# Patient Record
Sex: FEMALE | Race: WHITE | Employment: UNEMPLOYED | URBAN - METROPOLITAN AREA
[De-identification: names, ages, dates, MRNs, and addresses within clinical notes are randomized per-mention and may not be internally consistent; named-entity substitution may affect disease eponyms.]

---

## 2020-06-29 ENCOUNTER — HOSPITAL ENCOUNTER (EMERGENCY)
Age: 40
Discharge: HOME | End: 2020-06-29
Attending: EMERGENCY MEDICINE
Payer: COMMERCIAL

## 2020-06-29 VITALS
WEIGHT: 246.44 LBS | DIASTOLIC BLOOD PRESSURE: 80 MMHG | HEIGHT: 62 IN | SYSTOLIC BLOOD PRESSURE: 137 MMHG | RESPIRATION RATE: 18 BRPM | OXYGEN SATURATION: 96 % | TEMPERATURE: 98 F | HEART RATE: 104 BPM | BODY MASS INDEX: 45.35 KG/M2

## 2020-06-29 PROCEDURE — 26010 DRAINAGE OF FINGER ABSCESS: CPT

## 2020-06-29 PROCEDURE — 99282 EMERGENCY DEPT VISIT SF MDM: CPT

## 2020-06-29 RX ORDER — DOXYCYCLINE HYCLATE 100 MG
100 TABLET ORAL 2 TIMES DAILY
Qty: 20 TABLET | Refills: 0 | Status: SHIPPED | OUTPATIENT
Start: 2020-06-29 | End: 2020-06-30 | Stop reason: SDUPTHER

## 2020-06-29 ASSESSMENT — PAIN DESCRIPTION - FREQUENCY: FREQUENCY: CONTINUOUS

## 2020-06-29 ASSESSMENT — PAIN DESCRIPTION - LOCATION: LOCATION: FINGER (COMMENT WHICH ONE)

## 2020-06-29 ASSESSMENT — PAIN SCALES - GENERAL: PAINLEVEL_OUTOF10: 10

## 2020-06-30 RX ORDER — DOXYCYCLINE HYCLATE 100 MG
100 TABLET ORAL 2 TIMES DAILY
Qty: 20 TABLET | Refills: 0 | Status: SHIPPED | OUTPATIENT
Start: 2020-06-30 | End: 2021-05-05

## 2020-06-30 ASSESSMENT — ENCOUNTER SYMPTOMS
NAUSEA: 0
COLOR CHANGE: 1
SINUS PRESSURE: 0
SHORTNESS OF BREATH: 0
COUGH: 0
VOMITING: 0

## 2020-06-30 NOTE — ED NOTES
Pt ambulatory to room 8 with c/o infection to fingernail, onset approximately 2-3 days ago. Right hand, 1st digit has moderate amount of swelling with redness and warmth and yellow colored center to lateral aspect of thumb. Pt denies any fevers or N/V. Respirations even and non labored. NAD noted.       Amanda Ibarra RN  06/29/20 6285

## 2020-06-30 NOTE — ED PROVIDER NOTES
[address] [place] [address] ED  EMERGENCY DEPARTMENT ENCOUNTER      Pt Name: Malcolm German  MRN: [id number]  [place] 1980  Date of evaluation: 6/30/20  CHIEF COMPLAINT       Chief Complaint   Patient presents with    Wound Check     R thumb infection     HISTORY OF PRESENT ILLNESS   Since the emergency room via private auto with redness and swelling to the right thumb that started approximately 4 days ago. No injury or trauma. There has been no drainage. She denies fevers or decreased range of motion. The history is provided by the patient. REVIEW OF SYSTEMS     Review of Systems   Constitutional: Negative for activity change, fatigue and fever. HENT: Negative for congestion and sinus pressure. Respiratory: Negative for cough and shortness of breath. Cardiovascular: Negative for chest pain and palpitations. Gastrointestinal: Negative for nausea and vomiting. Musculoskeletal: Positive for arthralgias. Negative for joint swelling. Skin: Positive for color change and wound. Neurological: Negative for dizziness and headaches. Psychiatric/Behavioral: Negative for confusion and decreased concentration.      PASTMEDICAL HISTORY     Past Medical History:   Diagnosis Date    Asthma     history    Bipolar affect, depressed ([facility] [address]     ON MEDS    Environmental allergies     GERD (gastroesophageal reflux disease)     Lumbar pain     PVD (peripheral vascular disease) ([facility] [address]      SURGICAL HISTORY       Past Surgical History:   Procedure Laterality Date    DENTAL SURGERY  07/17/2013    extraction   x  2  teeth    DILATION AND CURETTAGE OF UTERUS  2010    WISDOM TOOTH EXTRACTION       CURRENT MEDICATIONS       Discharge Medication List as of 6/29/2020  9:14 PM      CONTINUE these medications which have NOT CHANGED    Details   ondansetron (ZOFRAN) 4 MG/2ML injection Infuse 2 mLs intravenously once as needed for Nausea for 1 dose., Disp-15 mL, R-0      citalopram (CELEXA) 40 MG tablet Take 40 mg by below, unless otherwise noted in MDM or here. Interpretation per the Radiologist below, if available at the time of this note:    No orders to display       LABS:  Labs Reviewed - No data to display    All other labs were within normal range or not returned as of this dictation. EMERGENCY DEPARTMENT COURSE and DIFFERENTIAL DIAGNOSIS/MDM:   Vitals:    Vitals:    20   BP: 137/80   Pulse: 104   Resp: 18   Temp: 98 °F (36.7 °C)   TempSrc: Oral   SpO2: 96%   Weight: 246 lb 7 oz (111.8 kg)   Height: 5' 2\" (1.575 m)       Medical Decision Makin-year-old female who is afebrile does not appear ill. No distress. She has paronychia to the right fingernail which was drained as documented below. She was discharged home with doxycycline. PROCEDURES:  Incision/Drainage  Date/Time: 2020 12:19 AM  Performed by: MARIBEL Fuentes - CNP  Authorized by: Syed Maurer MD     Consent:     Consent obtained:  Verbal    Consent given by:  Patient    Risks discussed:  Incomplete drainage and infection  Location:     Indications for incision and drainage: Paronychia. Location:  Upper extremity    Upper extremity location:  Finger    Finger location:  R thumb  Pre-procedure details:     Skin preparation:  Betadine  Anesthesia (see MAR for exact dosages): Anesthesia method: Ethyl chloride. Procedure details:     Scalpel blade:  11    Drainage amount: Moderate    Wound treatment:  Wound left open  Post-procedure details:     Patient tolerance of procedure: Tolerated well, no immediate complications        The patient was given the following meds in the ED:  Orders Placed This Encounter   Medications    doxycycline hyclate (VIBRA-TABS) 100 MG tablet     Sig: Take 1 tablet by mouth 2 times daily     Dispense:  20 tablet     Refill:  0       FINAL IMPRESSION      1.  Paronychia of finger of right hand          DISPOSITION/PLAN   DISPOSITION Decision To Discharge 2020 09:11:12 PM      PATIENT REFERRED TO:   April Alaniz  [address] [facility] [address] [facility]  [phone]    Schedule an appointment as soon as possible for a visit         DISCHARGE MEDICATIONS:     Discharge Medication List as of 6/29/2020  9:14 PM      START taking these medications    Details   doxycycline hyclate (VIBRA-TABS) 100 MG tablet Take 1 tablet by mouth 2 times daily, Disp-20 tablet, R-0Print             CRITICAL CARE TIME       Please note that portions of this note were completed with a voice recognition program.      MARIBEL GUEAVRA - CNP            MARIBEL Balderas - YUSRA  06/30/20 0020

## 2021-04-18 ENCOUNTER — APPOINTMENT (OUTPATIENT)
Dept: GENERAL RADIOLOGY | Facility: CLINIC | Age: 41
End: 2021-04-18
Payer: COMMERCIAL

## 2021-04-18 ENCOUNTER — HOSPITAL ENCOUNTER (EMERGENCY)
Facility: CLINIC | Age: 41
Discharge: HOME | End: 2021-04-19
Attending: EMERGENCY MEDICINE
Payer: COMMERCIAL

## 2021-04-18 VITALS
BODY MASS INDEX: 48.4 KG/M2 | DIASTOLIC BLOOD PRESSURE: 93 MMHG | TEMPERATURE: 97.5 F | OXYGEN SATURATION: 97 % | WEIGHT: 263 LBS | HEIGHT: 62 IN | RESPIRATION RATE: 20 BRPM | SYSTOLIC BLOOD PRESSURE: 157 MMHG

## 2021-04-18 DIAGNOSIS — R07.9 CHEST PAIN, UNSPECIFIED TYPE: Primary | ICD-10-CM

## 2021-04-18 LAB
ABSOLUTE EOS #: 0.1 K/UL (ref 0–0.4)
ABSOLUTE IMMATURE GRANULOCYTE: ABNORMAL K/UL (ref 0–0.3)
ABSOLUTE LYMPH #: 3 K/UL (ref 1–4.8)
ABSOLUTE MONO #: 1 K/UL (ref 0.1–1.2)
ALBUMIN SERPL-MCNC: 4.3 G/DL (ref 3.5–5.2)
ALBUMIN/GLOBULIN RATIO: 1.2 (ref 1–2.5)
ALP BLD-CCNC: 83 U/L (ref 35–104)
ALT SERPL-CCNC: 44 U/L (ref 5–33)
ANION GAP SERPL CALCULATED.3IONS-SCNC: 11 MMOL/L (ref 9–17)
AST SERPL-CCNC: 25 U/L
BASOPHILS # BLD: 1 % (ref 0–2)
BASOPHILS ABSOLUTE: 0.1 K/UL (ref 0–0.2)
BILIRUB SERPL-MCNC: 0.2 MG/DL (ref 0.3–1.2)
BILIRUBIN DIRECT: <0.08 MG/DL
BILIRUBIN, INDIRECT: ABNORMAL MG/DL (ref 0–1)
BUN BLDV-MCNC: 16 MG/DL (ref 6–20)
BUN/CREAT BLD: ABNORMAL (ref 9–20)
CALCIUM SERPL-MCNC: 9.3 MG/DL (ref 8.6–10.4)
CHLORIDE BLD-SCNC: 106 MMOL/L (ref 98–107)
CO2: 24 MMOL/L (ref 20–31)
CREAT SERPL-MCNC: 0.5 MG/DL (ref 0.5–0.9)
DIFFERENTIAL TYPE: ABNORMAL
EOSINOPHILS RELATIVE PERCENT: 1 % (ref 1–4)
GFR AFRICAN AMERICAN: >60 ML/MIN
GFR NON-AFRICAN AMERICAN: >60 ML/MIN
GFR SERPL CREATININE-BSD FRML MDRD: ABNORMAL ML/MIN/{1.73_M2}
GFR SERPL CREATININE-BSD FRML MDRD: ABNORMAL ML/MIN/{1.73_M2}
GLOBULIN: ABNORMAL G/DL (ref 1.5–3.8)
GLUCOSE BLD-MCNC: 117 MG/DL (ref 70–99)
HCT VFR BLD CALC: 40.1 % (ref 36–46)
HEMOGLOBIN: 12.9 G/DL (ref 12–16)
IMMATURE GRANULOCYTES: ABNORMAL %
LIPASE: 19 U/L (ref 13–60)
LYMPHOCYTES # BLD: 23 % (ref 24–44)
MCH RBC QN AUTO: 28.9 PG (ref 26–34)
MCHC RBC AUTO-ENTMCNC: 32.2 G/DL (ref 31–37)
MCV RBC AUTO: 89.6 FL (ref 80–100)
MONOCYTES # BLD: 8 % (ref 2–11)
MYOGLOBIN: <21 NG/ML (ref 25–58)
NRBC AUTOMATED: ABNORMAL PER 100 WBC
PDW BLD-RTO: 13.5 % (ref 12.5–15.4)
PLATELET # BLD: 439 K/UL (ref 140–450)
PLATELET ESTIMATE: ABNORMAL
PMV BLD AUTO: 7.7 FL (ref 6–12)
POTASSIUM SERPL-SCNC: 3.9 MMOL/L (ref 3.7–5.3)
RBC # BLD: 4.48 M/UL (ref 4–5.2)
RBC # BLD: ABNORMAL 10*6/UL
SEG NEUTROPHILS: 67 % (ref 36–66)
SEGMENTED NEUTROPHILS ABSOLUTE COUNT: 8.7 K/UL (ref 1.8–7.7)
SODIUM BLD-SCNC: 141 MMOL/L (ref 135–144)
TOTAL PROTEIN: 7.9 G/DL (ref 6.4–8.3)
TROPONIN INTERP: ABNORMAL
TROPONIN T: ABNORMAL NG/ML
TROPONIN, HIGH SENSITIVITY: <6 NG/L (ref 0–14)
WBC # BLD: 12.9 K/UL (ref 3.5–11)
WBC # BLD: ABNORMAL 10*3/UL

## 2021-04-18 PROCEDURE — 80076 HEPATIC FUNCTION PANEL: CPT

## 2021-04-18 PROCEDURE — 84484 ASSAY OF TROPONIN QUANT: CPT

## 2021-04-18 PROCEDURE — 36415 COLL VENOUS BLD VENIPUNCTURE: CPT

## 2021-04-18 PROCEDURE — 6370000000 HC RX 637 (ALT 250 FOR IP): Performed by: EMERGENCY MEDICINE

## 2021-04-18 PROCEDURE — 6360000002 HC RX W HCPCS: Performed by: EMERGENCY MEDICINE

## 2021-04-18 PROCEDURE — 83690 ASSAY OF LIPASE: CPT

## 2021-04-18 PROCEDURE — 99285 EMERGENCY DEPT VISIT HI MDM: CPT

## 2021-04-18 PROCEDURE — 83874 ASSAY OF MYOGLOBIN: CPT

## 2021-04-18 PROCEDURE — 96374 THER/PROPH/DIAG INJ IV PUSH: CPT

## 2021-04-18 PROCEDURE — 71045 X-RAY EXAM CHEST 1 VIEW: CPT

## 2021-04-18 PROCEDURE — 80048 BASIC METABOLIC PNL TOTAL CA: CPT

## 2021-04-18 PROCEDURE — 85025 COMPLETE CBC W/AUTO DIFF WBC: CPT

## 2021-04-18 PROCEDURE — 93005 ELECTROCARDIOGRAM TRACING: CPT | Performed by: EMERGENCY MEDICINE

## 2021-04-18 RX ORDER — ASPIRIN 81 MG/1
324 TABLET, CHEWABLE ORAL ONCE
Status: COMPLETED | OUTPATIENT
Start: 2021-04-18 | End: 2021-04-18

## 2021-04-18 RX ORDER — KETOROLAC TROMETHAMINE 30 MG/ML
30 INJECTION, SOLUTION INTRAMUSCULAR; INTRAVENOUS ONCE
Status: COMPLETED | OUTPATIENT
Start: 2021-04-18 | End: 2021-04-18

## 2021-04-18 RX ADMIN — ASPIRIN 324 MG: 81 TABLET, CHEWABLE ORAL at 20:20

## 2021-04-18 RX ADMIN — KETOROLAC TROMETHAMINE 30 MG: 30 INJECTION, SOLUTION INTRAMUSCULAR at 20:51

## 2021-04-18 ASSESSMENT — PAIN DESCRIPTION - ORIENTATION
ORIENTATION: LEFT

## 2021-04-18 ASSESSMENT — PAIN SCALES - GENERAL
PAINLEVEL_OUTOF10: 8
PAINLEVEL_OUTOF10: 8

## 2021-04-18 ASSESSMENT — PAIN DESCRIPTION - FREQUENCY
FREQUENCY: CONTINUOUS
FREQUENCY: CONTINUOUS

## 2021-04-18 ASSESSMENT — PAIN DESCRIPTION - PAIN TYPE: TYPE: ACUTE PAIN

## 2021-04-18 ASSESSMENT — PAIN DESCRIPTION - DESCRIPTORS: DESCRIPTORS: ACHING

## 2021-04-18 NOTE — ED PROVIDER NOTES
and adhesive tape. FAMILY HISTORY     has no family status information on file. family history is not on file. SOCIAL HISTORY      reports that she has never smoked. She has never used smokeless tobacco. She reports that she does not drink alcohol or use drugs. PHYSICAL EXAM     INITIAL VITALS:  height is 5' 2\" (1.575 m) and weight is 119.3 kg (263 lb). Her temperature is 97.5 °F (36.4 °C). Her blood pressure is 157/93 (abnormal). Her respiration is 20 and oxygen saturation is 97%. General: Patient is obese nontoxic-appearing morbidly obese female in no apparent distress  HEENT: Head is atraumatic conjunctiva are clear  Neck: Supple  Respiratory: Lung sounds are clear bilateral  Cardiac: Heart is regular rate and rhythm patient has trace reproducible pain with palpation over the anterior chest wall  GI: Abdomen is morbidly obese soft nontender bowel sounds are normal  Peripheral exam no cyanosis or edema  Neuro: Patient is alert oriented moves all 4 extremities well ambulated well    DIFFERENTIAL DIAGNOSIS/ MDM:     Acute coronary syndrome atypical chest pain chest wall pain pneumonia we will obtain EKG x-ray labs    DIAGNOSTIC RESULTS     EKG: All EKG's are interpreted by the Emergency Department Physician who either signs or Co-signs this chart in the absence of a cardiologist.    EKG interpreted by me reveals a normal sinus rhythm rate 87 with no acute ST elevations depressions normal WV interval normal QS complex normal axis    RADIOLOGY:   I directly visualized the following  images and reviewed the radiologist interpretations:  XR CHEST PORTABLE   Final Result   No acute process.                LABS:  Labs Reviewed   CBC WITH AUTO DIFFERENTIAL - Abnormal; Notable for the following components:       Result Value    WBC 12.9 (*)     Seg Neutrophils 67 (*)     Lymphocytes 23 (*)     Segs Absolute 8.70 (*)     All other components within normal limits   BASIC METABOLIC PANEL - Abnormal; Notable

## 2021-04-19 LAB
EKG ATRIAL RATE: 87 BPM
EKG P AXIS: 59 DEGREES
EKG P-R INTERVAL: 172 MS
EKG Q-T INTERVAL: 366 MS
EKG QRS DURATION: 90 MS
EKG QTC CALCULATION (BAZETT): 440 MS
EKG R AXIS: 43 DEGREES
EKG T AXIS: 50 DEGREES
EKG VENTRICULAR RATE: 87 BPM

## 2021-04-19 NOTE — ED NOTES
Pt presents to the ED via ambulation. Pt stated she went to the Urgent Care and was sent here for further evaluation.  Pt c/o left sided chest pain that began earlier this am. Pt c/o slight shortness of breath associated with the chest pain     Rosalia Cantu RN  04/18/21 2102       Rosalia Cantu RN  04/18/21 2105

## 2021-05-05 ENCOUNTER — HOSPITAL ENCOUNTER (EMERGENCY)
Facility: CLINIC | Age: 41
Discharge: HOME | End: 2021-05-05
Attending: EMERGENCY MEDICINE
Payer: COMMERCIAL

## 2021-05-05 VITALS
OXYGEN SATURATION: 97 % | BODY MASS INDEX: 46.78 KG/M2 | WEIGHT: 264 LBS | HEIGHT: 63 IN | HEART RATE: 100 BPM | DIASTOLIC BLOOD PRESSURE: 94 MMHG | TEMPERATURE: 98.1 F | RESPIRATION RATE: 18 BRPM | SYSTOLIC BLOOD PRESSURE: 134 MMHG

## 2021-05-05 DIAGNOSIS — K02.9 DENTAL CARIES: Primary | ICD-10-CM

## 2021-05-05 PROCEDURE — 6370000000 HC RX 637 (ALT 250 FOR IP): Performed by: EMERGENCY MEDICINE

## 2021-05-05 PROCEDURE — 99282 EMERGENCY DEPT VISIT SF MDM: CPT

## 2021-05-05 RX ORDER — CLINDAMYCIN HYDROCHLORIDE 150 MG/1
300 CAPSULE ORAL ONCE
Status: COMPLETED | OUTPATIENT
Start: 2021-05-05 | End: 2021-05-05

## 2021-05-05 RX ORDER — IBUPROFEN 800 MG/1
800 TABLET ORAL ONCE
Status: COMPLETED | OUTPATIENT
Start: 2021-05-05 | End: 2021-05-05

## 2021-05-05 RX ORDER — CLINDAMYCIN HYDROCHLORIDE 150 MG/1
300 CAPSULE ORAL 4 TIMES DAILY
Qty: 80 CAPSULE | Refills: 0 | Status: SHIPPED | OUTPATIENT
Start: 2021-05-05 | End: 2021-05-15

## 2021-05-05 RX ORDER — IBUPROFEN 800 MG/1
800 TABLET ORAL EVERY 8 HOURS PRN
Qty: 30 TABLET | Refills: 0 | OUTPATIENT
Start: 2021-05-05 | End: 2021-05-21

## 2021-05-05 RX ADMIN — IBUPROFEN 800 MG: 800 TABLET, FILM COATED ORAL at 16:46

## 2021-05-05 RX ADMIN — CLINDAMYCIN HYDROCHLORIDE 300 MG: 150 CAPSULE ORAL at 16:45

## 2021-05-05 ASSESSMENT — PAIN SCALES - GENERAL
PAINLEVEL_OUTOF10: 10
PAINLEVEL_OUTOF10: 10

## 2021-05-05 ASSESSMENT — PAIN DESCRIPTION - ORIENTATION: ORIENTATION: LEFT

## 2021-05-05 ASSESSMENT — PAIN DESCRIPTION - FREQUENCY: FREQUENCY: CONTINUOUS

## 2021-05-05 NOTE — ED NOTES
Assessment complaining of gum pain started few days ago. She does not have a dental appointment pending.       Chelsey Platt RN  05/05/21 5319

## 2021-05-05 NOTE — ED PROVIDER NOTES
intravenously once as needed for Nausea for 1 dose. TRAZODONE (DESYREL) 50 MG TABLET    Take 50 mg by mouth nightly. ALLERGIES     is allergic to ampicillin; bee venom; codeine; morphine; and adhesive tape. FAMILY HISTORY     has no family status information on file. family history is not on file. SOCIAL HISTORY      reports that she has never smoked. She has never used smokeless tobacco. She reports that she does not drink alcohol or use drugs. PHYSICAL EXAM       ED Triage Vitals [05/05/21 1627]   BP Temp Temp Source Pulse Resp SpO2 Height Weight   (!) 134/94 98.1 °F (36.7 °C) Oral 100 18 97 % 5' 3\" (1.6 m) 264 lb (119.7 kg)       Constitutional: Alert,  nontoxic flat affect. Answering questions in no acute distress. HEENT: Extraocular muscles intact, mucus membranes moist, TMs clear bilaterally, no posterior pharyngeal erythema or exudates, Pupils equal, round, reactive to light, dental decay tooth #17 with mild erythema no fluctuance. Neck: Trachea midline no meningismus  Cardiovascular: Regular rhythm and rate no S3, S4, or murmurs   Respiratory: Clear to auscultation bilaterally no wheezes, rhonchi, rales, no respiratory distress no tachypnea no retractions no hypoxia  Gastrointestinal: Soft, nontender, nondistended, positive bowel sounds. No rebound, rigidity, or guarding. Obese  Musculoskeletal: No extremity pain chronic lower extremity swelling no asymmetry no calf tenderness  Neurologic: Moving all 4 extremities without difficulty there are no gross focal neurologic deficits   Skin: Warm and dry       DIFFERENTIAL DIAGNOSIS/ MDM:     Dental caries. Antibiotics. No facial swelling. Motrin. Follow-up with dentist and return if worsening symptoms or other concerns.     DIAGNOSTIC RESULTS     EKG: All EKG's are interpreted by the Emergency Department Physician who either signs or Co-signs this chart in the absence of a cardiologist.        Not indicated unless otherwise documented above    LABS:  No results found for this visit on 05/05/21. Not indicated unless otherwise documented above    RADIOLOGY:   I reviewed the radiologist interpretations:    No orders to display       Not indicated unless otherwise documented above    EMERGENCY DEPARTMENT COURSE:     The patient was given the following medications:  Orders Placed This Encounter   Medications    clindamycin (CLEOCIN) capsule 300 mg     Order Specific Question:   Antimicrobial Indications     Answer: Other     Order Specific Question:   Other Abx Indication     Answer:   dental caries    ibuprofen (ADVIL;MOTRIN) tablet 800 mg    clindamycin (CLEOCIN) 150 MG capsule     Sig: Take 2 capsules by mouth 4 times daily for 10 days     Dispense:  80 capsule     Refill:  0    ibuprofen (ADVIL;MOTRIN) 800 MG tablet     Sig: Take 1 tablet by mouth every 8 hours as needed for Pain     Dispense:  30 tablet     Refill:  0        Vitals:   -------------------------  BP (!) 134/94   Pulse 100   Temp 98.1 °F (36.7 °C) (Oral)   Resp 18   Ht 5' 3\" (1.6 m)   Wt 119.7 kg (264 lb)   SpO2 97%   BMI 46.77 kg/m²           The patient understands that at this time there is no evidence for a more malignant underlying process, but also understands that early in the process of an illness or injury, an emergency department workup can be falsely reassuring. Routine discharge counseling was given, and it is understood that worsening, changing or persistent symptoms should prompt an immediate call or follow up with their primary physician or return to the emergency department. The importance of appropriate follow up was also discussed. I have reviewed the disposition diagnosis. I have answered the questions and given discharge instructions. There was voiced understanding of these instructions and no further questions or complaints.     CRITICAL CARE:    None    CONSULTS:    None    PROCEDURES:    None      OARRS Report if

## 2021-05-21 ENCOUNTER — HOSPITAL ENCOUNTER (EMERGENCY)
Facility: CLINIC | Age: 41
Discharge: HOME | End: 2021-05-21
Attending: EMERGENCY MEDICINE
Payer: COMMERCIAL

## 2021-05-21 VITALS
RESPIRATION RATE: 22 BRPM | OXYGEN SATURATION: 97 % | HEIGHT: 62 IN | HEART RATE: 83 BPM | SYSTOLIC BLOOD PRESSURE: 116 MMHG | DIASTOLIC BLOOD PRESSURE: 81 MMHG | TEMPERATURE: 98.6 F | BODY MASS INDEX: 48.4 KG/M2 | WEIGHT: 263 LBS

## 2021-05-21 DIAGNOSIS — J45.21 MILD INTERMITTENT ASTHMA WITH EXACERBATION: Primary | ICD-10-CM

## 2021-05-21 PROCEDURE — 99282 EMERGENCY DEPT VISIT SF MDM: CPT

## 2021-05-21 RX ORDER — ALBUTEROL SULFATE 2.5 MG/3ML
2.5 SOLUTION RESPIRATORY (INHALATION) EVERY 6 HOURS PRN
Qty: 30 EACH | Refills: 0 | Status: SHIPPED | OUTPATIENT
Start: 2021-05-21

## 2021-05-21 RX ORDER — ALBUTEROL SULFATE 90 UG/1
2 AEROSOL, METERED RESPIRATORY (INHALATION) EVERY 6 HOURS PRN
Qty: 1 INHALER | Refills: 1 | Status: SHIPPED | OUTPATIENT
Start: 2021-05-21 | End: 2022-10-20 | Stop reason: SDUPTHER

## 2021-05-21 ASSESSMENT — ENCOUNTER SYMPTOMS
ABDOMINAL PAIN: 0
CHEST TIGHTNESS: 0

## 2021-05-21 NOTE — ED PROVIDER NOTES
[address] ED  EMERGENCY DEPARTMENT ENCOUNTER      Pt Name: Shawanda Holguin  MRN: [id number]  [place] 1980  Date of evaluation: 5/21/2021  Provider: Noni Magaña MD    CHIEF COMPLAINT     Chief Complaint   Patient presents with    Shortness of Breath     since last night, hx of asthma. denies cough. pt is fully vaccinated. denies fever. pt states she doesn't have any inhalers at home and has never been prescribed anything for asthma. pt is not a smoker. HISTORY OF PRESENT ILLNESS   (Location/Symptom, Timing/Onset, Context/Setting,Quality, Duration, Modifying Factors, Severity)  Note limiting factors. Shawanda Holguin is a 41 y.o. female who presents to the emergency department stating she has a history of asthma and is experiencing a recent flareup in the last few days. She has used inhalers and nebulized albuterol in the past but does not have any tubing and has run out of her inhalers. She denies fever, hemoptysis or myalgias. The history is provided by the patient and medical records. Nursing Notes werereviewed. REVIEW OF SYSTEMS    (2-9 systems for level 4, 10 or more for level 5)     Review of Systems   Constitutional: Negative for fever. Respiratory: Negative for chest tightness. Gastrointestinal: Negative for abdominal pain. All other systems reviewed and are negative. Except as noted above the remainder of the review of systems was reviewed and negative.        PAST MEDICAL HISTORY     Past Medical History:   Diagnosis Date    Asthma     history    Bipolar affect, depressed ([address]     ON MEDS    Environmental allergies     GERD (gastroesophageal reflux disease)     Lumbar pain     PVD (peripheral vascular disease) ([facility] [address]          SURGICALHISTORY       Past Surgical History:   Procedure Laterality Date    DENTAL SURGERY  07/17/2013    extraction   x  2  teeth    DILATION AND CURETTAGE OF UTERUS  2010    WISDOM TOOTH EXTRACTION           CURRENT MEDICATIONS       Previous Medications    CITALOPRAM (CELEXA) 40 MG TABLET    Take 40 mg by mouth every morning. ONDANSETRON (ZOFRAN) 4 MG/2ML INJECTION    Infuse 2 mLs intravenously once as needed for Nausea for 1 dose. TRAZODONE (DESYREL) 50 MG TABLET    Take 50 mg by mouth nightly. ALLERGIES     Ampicillin, Bee venom, Codeine, Morphine, and Adhesive tape    FAMILY HISTORY     No family history on file. SOCIAL HISTORY       Social History     Socioeconomic History    Marital status: Single     Spouse name: Not on file    Number of children: Not on file    Years of education: Not on file    Highest education level: Not on file   Occupational History    Not on file   Tobacco Use    Smoking status: Never Smoker    Smokeless tobacco: Never Used   Vaping Use    Vaping Use: Never used   Substance and Sexual Activity    Alcohol use: No    Drug use: No    Sexual activity: Not on file   Other Topics Concern    Not on file   Social History Narrative    Not on file     Social Determinants of Health     Financial Resource Strain:     Difficulty of Paying Living Expenses:    Food Insecurity:     Worried About Running Out of Food in the Last Year:     [address] in the Last Year:    Transportation Needs:     Lack of Transportation (Medical):      Lack of Transportation (Non-Medical):    Physical Activity:     Days of Exercise per Week:     Minutes of Exercise per Session:    Stress:     Feeling of Stress :    Social Connections:     Frequency of Communication with Friends and Family:     Frequency of Social Gatherings with Friends and Family:     Attends [employer] Services:     Active Member of Clubs or Organizations:     Attends Club or Organization Meetings:     Marital Status:    Intimate Partner Violence:     Fear of Current or Ex-Partner:     Emotionally Abused:     Physically Abused:     Sexually Abused:        SCREENINGS             PHYSICAL EXAM    (up to 7 for level 4, 8 or more for level 5)     ED Triage Vitals [05/21/21 1650]   BP Temp Temp Source Pulse Resp SpO2 Height Weight   116/81 98.6 °F (37 °C) Oral 83 22 97 % 5' 2\" (1.575 m) 263 lb (119.3 kg)       Physical Exam  Vitals reviewed. Constitutional:       General: She is not in acute distress. Appearance: She is obese. HENT:      Head: Normocephalic. Nose: Nose normal.   Eyes:      Extraocular Movements: Extraocular movements intact. Cardiovascular:      Rate and Rhythm: Normal rate and regular rhythm. Pulmonary:      Effort: Pulmonary effort is normal.      Breath sounds: Normal breath sounds. No rhonchi or rales. Skin:     General: Skin is warm and dry. Coloration: Skin is not pale. Neurological:      General: No focal deficit present. Mental Status: She is alert. DIAGNOSTIC RESULTS     EKG: All EKG's are interpreted by the Emergency Department Physician who either signs orCo-signs this chart in the absence of a cardiologist.    RADIOLOGY:     Interpretation per the Radiologist below, ifavailable at the time of this note:    No orders to display         ED BEDSIDE ULTRASOUND:   Performed by ED Physician - none    LABS:  Labs Reviewed - No data to display    All other labs were within normal range ornot returned as of this dictation. EMERGENCY DEPARTMENT COURSE and DIFFERENTIAL DIAGNOSIS/MDM:   Vitals:    Vitals:    05/21/21 1650   BP: 116/81   Pulse: 83   Resp: 22   Temp: 98.6 °F (37 °C)   TempSrc: Oral   SpO2: 97%   Weight: 119.3 kg (263 lb)   Height: 5' 2\" (1.575 m)            Patient presents with exacerbation of asthma symptoms and a history of underlying asthma. She is in no respiratory distress at the time of my evaluation and will be prescribed her inhaled bronchodilators. MDM    CONSULTS:  None    PROCEDURES:  Unlessotherwise noted below, none     Procedures    FINAL IMPRESSION      1.  Mild intermittent asthma with exacerbation          DISPOSITION/PLAN   DISPOSITION

## 2021-05-30 ENCOUNTER — HOSPITAL ENCOUNTER (EMERGENCY)
Facility: CLINIC | Age: 41
Discharge: HOME | End: 2021-05-30
Attending: SPECIALIST
Payer: COMMERCIAL

## 2021-05-30 ENCOUNTER — APPOINTMENT (OUTPATIENT)
Dept: GENERAL RADIOLOGY | Facility: CLINIC | Age: 41
End: 2021-05-30
Payer: COMMERCIAL

## 2021-05-30 VITALS
OXYGEN SATURATION: 96 % | WEIGHT: 263 LBS | BODY MASS INDEX: 48.4 KG/M2 | HEIGHT: 62 IN | HEART RATE: 86 BPM | DIASTOLIC BLOOD PRESSURE: 75 MMHG | RESPIRATION RATE: 16 BRPM | TEMPERATURE: 98.7 F | SYSTOLIC BLOOD PRESSURE: 114 MMHG

## 2021-05-30 DIAGNOSIS — S46.911A STRAIN OF RIGHT SHOULDER, INITIAL ENCOUNTER: Primary | ICD-10-CM

## 2021-05-30 DIAGNOSIS — S29.012A UPPER BACK STRAIN, INITIAL ENCOUNTER: ICD-10-CM

## 2021-05-30 PROCEDURE — 99285 EMERGENCY DEPT VISIT HI MDM: CPT

## 2021-05-30 PROCEDURE — 6370000000 HC RX 637 (ALT 250 FOR IP): Performed by: SPECIALIST

## 2021-05-30 PROCEDURE — 73030 X-RAY EXAM OF SHOULDER: CPT

## 2021-05-30 PROCEDURE — 71045 X-RAY EXAM CHEST 1 VIEW: CPT

## 2021-05-30 RX ORDER — IBUPROFEN 800 MG/1
800 TABLET ORAL EVERY 8 HOURS PRN
Qty: 20 TABLET | Refills: 0 | Status: SHIPPED | OUTPATIENT
Start: 2021-05-30 | End: 2021-07-13

## 2021-05-30 RX ORDER — IBUPROFEN 800 MG/1
800 TABLET ORAL ONCE
Status: COMPLETED | OUTPATIENT
Start: 2021-05-30 | End: 2021-05-30

## 2021-05-30 RX ADMIN — IBUPROFEN 800 MG: 800 TABLET, FILM COATED ORAL at 02:37

## 2021-05-30 ASSESSMENT — PAIN DESCRIPTION - LOCATION: LOCATION: BACK

## 2021-05-30 ASSESSMENT — PAIN DESCRIPTION - DESCRIPTORS: DESCRIPTORS: ACHING

## 2021-05-30 ASSESSMENT — PAIN DESCRIPTION - ORIENTATION: ORIENTATION: RIGHT

## 2021-05-30 ASSESSMENT — PAIN DESCRIPTION - FREQUENCY: FREQUENCY: INTERMITTENT

## 2021-05-30 ASSESSMENT — PAIN SCALES - GENERAL: PAINLEVEL_OUTOF10: 10

## 2021-05-30 ASSESSMENT — PAIN DESCRIPTION - PAIN TYPE: TYPE: ACUTE PAIN;CHRONIC PAIN

## 2021-05-30 NOTE — ED PROVIDER NOTES
[facility]  [facility]  Phone: [phone]      Pt Name: Sola Metzger  MRN: [id number]  [place] 1980  Date of evaluation: 5/30/2021      CHIEF COMPLAINT       Chief Complaint   Patient presents with    Back Pain    Shoulder Pain         HISTORY OF PRESENT ILLNESS    Sola Metzger is a 41 y.o. female who presents   Chief Complaint   Patient presents with    Back Pain    Shoulder Pain   . 40-year-old female patient presents to the emergency department for evaluation of right sided upper back pain and right shoulder pain since last night. Patient denies any fall or injuries. She denies any cough, shortness of breath, fever or chills. Pain is worse with the movements and better with rest. She grades pain as 9 out of 10 in intensity dull aching in nature. Patient has not taken any medications for the pain. She denies any chest pain, shortness of breath, nausea, vomiting, lightheadedness, dizziness, palpitations or diaphoresis. She has history of intermittent back pain but it is mostly in the lower back. She denies any swelling in the legs and denies any calf pain. She denies any recent long travels or prolonged immobilization and no history of DVT or PE in the past. She has history of bronchial asthma but denies any wheezing at this time. REVIEW OF SYSTEMS       Review of Systems    All systems reviewed and negative unless noted in HPI. The patient denies fever or constitutional symptoms. Denies vision change. Denies any sore throat or rhinorrhea. Denies any neck pain or stiffness. Denies chest pain or shortness of breath. No nausea,  vomiting or diarrhea. Denies any dysuria. Denies urinary frequency or hematuria. Denies any weakness, numbness or focal neurologic deficit. Denies any skin rash or edema. No recent psychiatric issues. No easy bruising or bleeding.    Denies any polyuria, polydypsia       PAST MEDICAL HISTORY    has a past medical history of Asthma, Bipolar affect, depressed ([address] Environmental allergies, GERD (gastroesophageal reflux disease), Lumbar pain, and PVD (peripheral vascular disease) ([facility] [address] SURGICAL HISTORY      has a past surgical history that includes Dilation and curettage of uterus (2010); [place] tooth extraction; and Dental surgery (07/17/2013). CURRENT MEDICATIONS       Discharge Medication List as of 5/30/2021  3:13 AM      CONTINUE these medications which have NOT CHANGED    Details   albuterol sulfate  (90 Base) MCG/ACT inhaler Inhale 2 puffs into the lungs every 6 hours as needed for Wheezing or Shortness of Breath Indications: Wheezing, Disp-1 Inhaler, R-1Normal      albuterol (PROVENTIL) (2.5 MG/3ML) 0.083% nebulizer solution Take 3 mLs by nebulization every 6 hours as needed for Wheezing, Disp-30 each, R-0Normal      ondansetron (ZOFRAN) 4 MG/2ML injection Infuse 2 mLs intravenously once as needed for Nausea for 1 dose., Disp-15 mL, R-0      citalopram (CELEXA) 40 MG tablet Take 40 mg by mouth every morning. traZODone (DESYREL) 50 MG tablet Take 50 mg by mouth nightly. ALLERGIES     is allergic to ampicillin, bee venom, codeine, morphine, and adhesive tape. FAMILY HISTORY     has no family status information on file. family history is not on file. SOCIAL HISTORY      reports that she has never smoked. She has never used smokeless tobacco. She reports that she does not drink alcohol and does not use drugs. PHYSICAL EXAM     INITIAL VITALS:  height is 5' 2\" (1.575 m) and weight is 119.3 kg (263 lb). Her oral temperature is 98.7 °F (37.1 °C). Her blood pressure is 114/75 and her pulse is 86. Her respiration is 16 and oxygen saturation is 96%. Physical Exam  Vitals and nursing note reviewed. Constitutional:       Appearance: She is well-developed. HENT:      Head: Normocephalic and atraumatic.       Nose: Nose normal.   Eyes:      Extraocular Movements: HISTORY: pain Reason for Exam: Pain to the right shoulder and upper back sudden onset prior to arrival w/ no known injury Acuity: Acute Type of Exam: Initial FINDINGS: Glenohumeral joint is normally aligned. No evidence of acute fracture or dislocation. No abnormal periarticular calcifications. The [facility] joint is unremarkable in appearance. Visualized lung is unremarkable. No acute abnormality. XR CHEST PORTABLE    Result Date: 5/30/2021  EXAMINATION: ONE XRAY VIEW OF THE CHEST 5/30/2021 2:41 am COMPARISON: 04/18/2021 HISTORY: ORDERING SYSTEM PROVIDED HISTORY: Right upper back pain TECHNOLOGIST PROVIDED HISTORY: Right upper back pain Reason for Exam: Pain to the right shoulder and upper back sudden onset prior to arrival w/ no known injury Acuity: Acute Type of Exam: Initial FINDINGS: Mild stable cardiomegaly. Normal pulmonary vasculature. The lungs are clear and normally expanded. Surrounding osseous and soft tissue structures are unremarkable. No acute cardiopulmonary abnormality. Mild stable cardiomegaly. LABS:  Labs Reviewed - No data to display      EMERGENCY DEPARTMENT COURSE:   Vitals:    Vitals:    05/30/21 0147   BP: 114/75   Pulse: 86   Resp: 16   Temp: 98.7 °F (37.1 °C)   TempSrc: Oral   SpO2: 96%   Weight: 119.3 kg (263 lb)   Height: 5' 2\" (1.575 m)     -------------------------  BP: 114/75, Temp: 98.7 °F (37.1 °C), Pulse: 86, Resp: 16    Orders Placed This Encounter   Medications    ibuprofen (ADVIL;MOTRIN) tablet 800 mg    ibuprofen (ADVIL;MOTRIN) 800 MG tablet     Sig: Take 1 tablet by mouth every 8 hours as needed for Pain     Dispense:  20 tablet     Refill:  0       During the emergency department course, patient was given Motrin 10 mg orally after which her pain has improved significantly. She is able to move her shoulder freely and she is ambulatory in the hallway. Upper back pain is also improved. Her x-rays are unremarkable.  I do not have any high index of suspicion portions of this note were completed with a voice recognition program.  Efforts were made to edit the dictations but occasionally words are mis-transcribed.)    Krys Elias MD,, MD, F.A.C.E.P.   Attending Emergency Physician     Krys Elias MD  05/30/21 0101

## 2021-06-05 ENCOUNTER — APPOINTMENT (OUTPATIENT)
Dept: GENERAL RADIOLOGY | Facility: CLINIC | Age: 41
End: 2021-06-05
Payer: COMMERCIAL

## 2021-06-05 ENCOUNTER — HOSPITAL ENCOUNTER (EMERGENCY)
Facility: CLINIC | Age: 41
Discharge: HOME | End: 2021-06-05
Attending: EMERGENCY MEDICINE
Payer: COMMERCIAL

## 2021-06-05 VITALS
WEIGHT: 263 LBS | OXYGEN SATURATION: 93 % | RESPIRATION RATE: 18 BRPM | BODY MASS INDEX: 48.4 KG/M2 | TEMPERATURE: 99 F | DIASTOLIC BLOOD PRESSURE: 77 MMHG | SYSTOLIC BLOOD PRESSURE: 140 MMHG | HEIGHT: 62 IN | HEART RATE: 92 BPM

## 2021-06-05 DIAGNOSIS — K59.00 CONSTIPATION, UNSPECIFIED CONSTIPATION TYPE: Primary | ICD-10-CM

## 2021-06-05 LAB
ANION GAP SERPL CALCULATED.3IONS-SCNC: 11 MMOL/L (ref 9–17)
BUN BLDV-MCNC: 10 MG/DL (ref 6–20)
BUN/CREAT BLD: ABNORMAL (ref 9–20)
CALCIUM SERPL-MCNC: 9.3 MG/DL (ref 8.6–10.4)
CHLORIDE BLD-SCNC: 107 MMOL/L (ref 98–107)
CO2: 23 MMOL/L (ref 20–31)
CREAT SERPL-MCNC: 0.5 MG/DL (ref 0.5–0.9)
GFR AFRICAN AMERICAN: >60 ML/MIN
GFR NON-AFRICAN AMERICAN: >60 ML/MIN
GFR SERPL CREATININE-BSD FRML MDRD: ABNORMAL ML/MIN/{1.73_M2}
GFR SERPL CREATININE-BSD FRML MDRD: ABNORMAL ML/MIN/{1.73_M2}
GLUCOSE BLD-MCNC: 151 MG/DL (ref 70–99)
POTASSIUM SERPL-SCNC: 3.7 MMOL/L (ref 3.7–5.3)
SODIUM BLD-SCNC: 141 MMOL/L (ref 135–144)

## 2021-06-05 PROCEDURE — 74018 RADEX ABDOMEN 1 VIEW: CPT

## 2021-06-05 PROCEDURE — 6370000000 HC RX 637 (ALT 250 FOR IP): Performed by: EMERGENCY MEDICINE

## 2021-06-05 PROCEDURE — 36415 COLL VENOUS BLD VENIPUNCTURE: CPT

## 2021-06-05 PROCEDURE — 99285 EMERGENCY DEPT VISIT HI MDM: CPT

## 2021-06-05 PROCEDURE — 80048 BASIC METABOLIC PNL TOTAL CA: CPT

## 2021-06-05 RX ORDER — BISACODYL 10 MG
10 SUPPOSITORY, RECTAL RECTAL ONCE
Status: COMPLETED | OUTPATIENT
Start: 2021-06-05 | End: 2021-06-05

## 2021-06-05 RX ORDER — MAGNESIUM CARB/ALUMINUM HYDROX 105-160MG
150 TABLET,CHEWABLE ORAL 2 TIMES DAILY PRN
Qty: 2 BOTTLE | Refills: 0 | Status: SHIPPED | OUTPATIENT
Start: 2021-06-05 | End: 2021-06-07

## 2021-06-05 RX ORDER — DOCUSATE SODIUM 100 MG/1
100 CAPSULE, LIQUID FILLED ORAL 2 TIMES DAILY PRN
Qty: 28 CAPSULE | Refills: 0 | Status: SHIPPED | OUTPATIENT
Start: 2021-06-05 | End: 2021-06-19

## 2021-06-05 RX ADMIN — BISACODYL 10 MG: 10 SUPPOSITORY RECTAL at 18:00

## 2021-06-05 RX ADMIN — BISACODYL 10 MG: 10 SUPPOSITORY RECTAL at 18:01

## 2021-06-05 NOTE — ED PROVIDER NOTES
[facility]  [address] [facility]  Phone: [address]      Pt Name: Hugo Oleary  MRN: [id number]  [place] 1980  Date of evaluation: 6/5/2021    CHIEF COMPLAINT       Chief Complaint   Patient presents with    Constipation       HISTORY OF PRESENT ILLNESS    Hugo Oleary is a 41 y.o. female who presents with constipation. She states that she had some hard stool this morning and has not been able to have a regular bowel movement today. States that she has not tried any medications for this. She states this is generally not a problem for her. Notes no blood on the toilet paper. REVIEW OF SYSTEMS     Constitutional: No fevers or chills   HEENT: No sore throat, rhinorrhea, or earache   Eyes: No blurry vision or double vision no drainage   Cardiovascular: No chest pain or tachycardia   Respiratory: No wheezing or shortness of breath no cough   Gastrointestinal: No nausea, vomiting, diarrhea, or abdominal pain see above  : No hematuria or dysuria   Musculoskeletal: No swelling or pain   Skin: No rash   Neurological: No focal neurologic complaints, paresthesias, weakness, or headache   PAST MEDICAL HISTORY    has a past medical history of Asthma, Bipolar affect, depressed ([address] Environmental allergies, GERD (gastroesophageal reflux disease), Lumbar pain, and PVD (peripheral vascular disease) ([facility] [address] SURGICAL HISTORY      has a past surgical history that includes Dilation and curettage of uterus (2010); [place] tooth extraction; and Dental surgery (07/17/2013).     CURRENT MEDICATIONS       Previous Medications    ALBUTEROL (PROVENTIL) (2.5 MG/3ML) 0.083% NEBULIZER SOLUTION    Take 3 mLs by nebulization every 6 hours as needed for Wheezing    ALBUTEROL SULFATE  (90 BASE) MCG/ACT INHALER    Inhale 2 puffs into the lungs every 6 hours as needed for Wheezing or Shortness of Breath Indications: Wheezing    CITALOPRAM (CELEXA) 40 MG TABLET Take 40 mg by mouth every morning. IBUPROFEN (ADVIL;MOTRIN) 800 MG TABLET    Take 1 tablet by mouth every 8 hours as needed for Pain    ONDANSETRON (ZOFRAN) 4 MG/2ML INJECTION    Infuse 2 mLs intravenously once as needed for Nausea for 1 dose. TRAZODONE (DESYREL) 50 MG TABLET    Take 50 mg by mouth nightly. ALLERGIES     is allergic to ampicillin, bee venom, codeine, morphine, and adhesive tape. FAMILY HISTORY     has no family status information on file. family history is not on file. SOCIAL HISTORY      reports that she has never smoked. She has never used smokeless tobacco. She reports that she does not drink alcohol and does not use drugs. PHYSICAL EXAM       ED Triage Vitals [06/05/21 1745]   BP Temp Temp Source Pulse Resp SpO2 Height Weight   (!) 140/77 99 °F (37.2 °C) Oral 92 18 93 % 5' 2\" (1.575 m) 263 lb (119.3 kg)     Constitutional: Alert, oriented x3, nontoxic, answering questions appropriately, acting properly for age, in no acute distress   HEENT: Extraocular muscles intact, mucous membranes moist. Pupils equal, round, reactive to light, TMs clear bilaterally, no posterior pharyngeal erythema or exudates.    Neck: Trachea midline, supple without lymphadenopathy, no posterior midline neck tenderness to palpation   Musculoskeletal: No extremity pain or swelling   Neurologic: Moving all 4 extremities without difficulty   Skin: Warm and dry       DIFFERENTIAL DIAGNOSIS/ MDM:         DIAGNOSTIC RESULTS     EKG: All EKG's are interpreted by the Emergency Department Physician who either signs or Co-signs this chart in the absence of a cardiologist.        Not indicated unless otherwise documented above    LABS:  Results for orders placed or performed during the hospital encounter of 24/57/48   Basic Metabolic Panel w/ Reflex to MG   Result Value Ref Range    Glucose 151 (H) 70 - 99 mg/dL    BUN 10 6 - 20 mg/dL    CREATININE 0.50 0.50 - 0.90 mg/dL    Bun/Cre Ratio NOT REPORTED 9 - 20 Calcium 9.3 8.6 - 10.4 mg/dL    Sodium 141 135 - 144 mmol/L    Potassium 3.7 3.7 - 5.3 mmol/L    Chloride 107 98 - 107 mmol/L    CO2 23 20 - 31 mmol/L    Anion Gap 11 9 - 17 mmol/L    GFR Non-African American >60 >60 mL/min    GFR African American >60 >60 mL/min    GFR Comment          GFR Staging NOT REPORTED        Not indicated unless otherwise documented above    RADIOLOGY:   I reviewed the radiologist interpretations:    XR ABDOMEN (KUB) (SINGLE AP VIEW)   Final Result   1. Mild-to-moderate amount of stool burden present throughout the colon   2. Nonobstructive bowel gas pattern             Not indicated unless otherwise documented above    EMERGENCY DEPARTMENT COURSE:     The patient was given the following medications:  Orders Placed This Encounter   Medications    bisacodyl (DULCOLAX) suppository 10 mg    bisacodyl (DULCOLAX) suppository 10 mg    docusate sodium (COLACE) 100 MG capsule     Sig: Take 1 capsule by mouth 2 times daily as needed for Constipation     Dispense:  28 capsule     Refill:  0    Magnesium Citrate 1.745 GM/30ML solution     Sig: Take 150 mLs by mouth 2 times daily as needed for Constipation (Take 150 ml by mouth x1 when necessary for constipation. May repeat this x1 one again at no results in 4 hours.)     Dispense:  2 Bottle     Refill:  0        Vitals:   -------------------------  BP (!) 140/77   Pulse 92   Temp 99 °F (37.2 °C) (Oral)   Resp 18   Ht 5' 2\" (1.575 m)   Wt 119.3 kg (263 lb)   LMP 05/20/2021   SpO2 93%   BMI 48.10 kg/m²         I have reviewed the disposition diagnosis with the patient and or their family/guardian. I have answered their questions and given discharge instructions. They voiced understanding of these instructions and did not have any furtherquestions or complaints. CRITICAL CARE:    None    CONSULTS:    None    PROCEDURES:    None      OARRS Report if indicated             FINAL IMPRESSION      1.  Constipation, unspecified constipation

## 2021-06-05 NOTE — ED NOTES
Mode of arrival (squad #, walk in, police, etc) : walk in        Chief complaint(s): constipation        Arrival Note (brief scenario, treatment PTA, etc). : Pt presented to the ED c/o having hard stool last night. Pt states she has never has this problem before. Pt states she has to really strain last night to past her stool. Pt denies blood in stool. Pt denies abdominal or rectal pain at this time. Pt denies taking any laxative at home prior to her arrival in the ED. Pt denies N/V. Pt alert and oriented, no abdominal tenderness noted. C= \"Have you ever felt that you should Cut down on your drinking? \"  No  A= \"Have people Annoyed you by criticizing your drinking? \"  No  G= \"Have you ever felt bad or Guilty about your drinking? \"  No  E= \"Have you ever had a drink as an Eye-opener first thing in the morning to steady your nerves or to help a hangover? \"  No      Deferred []      Reason for deferring: N/A    *If yes to two or more: probable alcohol abuse. Omar Higgins RN  06/05/21 5329

## 2021-06-18 ENCOUNTER — HOSPITAL ENCOUNTER (EMERGENCY)
Facility: CLINIC | Age: 41
Discharge: HOME | End: 2021-06-19
Attending: EMERGENCY MEDICINE
Payer: COMMERCIAL

## 2021-06-18 VITALS
HEIGHT: 62 IN | DIASTOLIC BLOOD PRESSURE: 87 MMHG | TEMPERATURE: 99.7 F | OXYGEN SATURATION: 96 % | WEIGHT: 246 LBS | HEART RATE: 97 BPM | BODY MASS INDEX: 45.27 KG/M2 | SYSTOLIC BLOOD PRESSURE: 144 MMHG | RESPIRATION RATE: 18 BRPM

## 2021-06-18 DIAGNOSIS — S83.91XA SPRAIN OF RIGHT KNEE, UNSPECIFIED LIGAMENT, INITIAL ENCOUNTER: Primary | ICD-10-CM

## 2021-06-18 PROCEDURE — 6370000000 HC RX 637 (ALT 250 FOR IP): Performed by: EMERGENCY MEDICINE

## 2021-06-18 PROCEDURE — 99284 EMERGENCY DEPT VISIT MOD MDM: CPT

## 2021-06-18 RX ORDER — IBUPROFEN 800 MG/1
800 TABLET ORAL ONCE
Status: COMPLETED | OUTPATIENT
Start: 2021-06-18 | End: 2021-06-18

## 2021-06-18 RX ADMIN — IBUPROFEN 800 MG: 800 TABLET, FILM COATED ORAL at 23:33

## 2021-06-18 ASSESSMENT — ENCOUNTER SYMPTOMS
SHORTNESS OF BREATH: 0
ABDOMINAL PAIN: 0
SORE THROAT: 0
EYE PAIN: 0
VOMITING: 0
NAUSEA: 0
COUGH: 0
DIARRHEA: 0
RHINORRHEA: 0
BACK PAIN: 0

## 2021-06-18 ASSESSMENT — PAIN SCALES - GENERAL
PAINLEVEL_OUTOF10: 8
PAINLEVEL_OUTOF10: 8

## 2021-06-18 ASSESSMENT — PAIN DESCRIPTION - PAIN TYPE: TYPE: ACUTE PAIN

## 2021-06-18 ASSESSMENT — PAIN DESCRIPTION - ORIENTATION: ORIENTATION: RIGHT

## 2021-06-18 ASSESSMENT — PAIN DESCRIPTION - LOCATION: LOCATION: KNEE

## 2021-06-18 ASSESSMENT — PAIN DESCRIPTION - DESCRIPTORS: DESCRIPTORS: ACHING

## 2021-06-18 ASSESSMENT — PAIN DESCRIPTION - FREQUENCY: FREQUENCY: CONTINUOUS

## 2021-06-19 ENCOUNTER — APPOINTMENT (OUTPATIENT)
Dept: GENERAL RADIOLOGY | Facility: CLINIC | Age: 41
End: 2021-06-19
Payer: COMMERCIAL

## 2021-06-19 PROCEDURE — 73562 X-RAY EXAM OF KNEE 3: CPT

## 2021-06-19 RX ORDER — IBUPROFEN 800 MG/1
800 TABLET ORAL EVERY 8 HOURS PRN
Qty: 30 TABLET | Refills: 0 | Status: SHIPPED | OUTPATIENT
Start: 2021-06-19 | End: 2021-07-13

## 2021-06-19 ASSESSMENT — PAIN DESCRIPTION - LOCATION: LOCATION: KNEE

## 2021-06-19 ASSESSMENT — PAIN DESCRIPTION - ORIENTATION: ORIENTATION: RIGHT

## 2021-06-19 ASSESSMENT — PAIN DESCRIPTION - PAIN TYPE: TYPE: ACUTE PAIN

## 2021-06-19 ASSESSMENT — PAIN SCALES - GENERAL: PAINLEVEL_OUTOF10: 4

## 2021-06-19 ASSESSMENT — PAIN DESCRIPTION - FREQUENCY: FREQUENCY: CONTINUOUS

## 2021-06-19 ASSESSMENT — PAIN DESCRIPTION - DESCRIPTORS: DESCRIPTORS: ACHING

## 2021-06-19 NOTE — ED PROVIDER NOTES
[facility]  [facility]  Phone: [address]          Pt Name: Alma Salgado  MRN: [id number]  [place] 1980  Date of evaluation: 6/18/2021      CHIEF COMPLAINT       Chief Complaint   Patient presents with    Leg Pain    Knee Pain     right       HISTORY OF PRESENT ILLNESS       Alma Salgado is a 41 y.o. female who presents with right knee pain. States she was walking today and felt/heard a pop sensation to that knee. No prior issues or injuries to that knee. Did not fall. Has still been able to bear weight and ambulate on the knee albeit with some pain. No prearrival analgesics. Ambulating makes it worse. Rest makes it better. Denies other symptoms, injuries or concerns. REVIEW OF SYSTEMS       Review of Systems   Constitutional: Negative for chills, fatigue and fever. HENT: Negative for rhinorrhea and sore throat. Eyes: Negative for pain. Respiratory: Negative for cough and shortness of breath. Cardiovascular: Negative for chest pain. Gastrointestinal: Negative for abdominal pain, diarrhea, nausea and vomiting. Genitourinary: Negative for difficulty urinating. Musculoskeletal: Negative for back pain and neck pain. Skin: Negative for rash. Neurological: Negative for weakness and headaches. PAST MEDICAL HISTORY    has a past medical history of Asthma, Bipolar affect, depressed ([address] Environmental allergies, GERD (gastroesophageal reflux disease), Lumbar pain, and PVD (peripheral vascular disease) ([facility] [address] SURGICAL HISTORY      has a past surgical history that includes Dilation and curettage of uterus (2010); [place] tooth extraction; and Dental surgery (07/17/2013).     CURRENT MEDICATIONS       Discharge Medication List as of 6/19/2021  1:06 AM      CONTINUE these medications which have NOT CHANGED    Details   albuterol sulfate  (90 Base) MCG/ACT inhaler Inhale 2 puffs into the lungs every 6 hours as needed for Wheezing or Shortness of Breath Indications: Wheezing, Disp-1 Inhaler, R-1Normal      albuterol (PROVENTIL) (2.5 MG/3ML) 0.083% nebulizer solution Take 3 mLs by nebulization every 6 hours as needed for Wheezing, Disp-30 each, R-0Normal      citalopram (CELEXA) 40 MG tablet Take 40 mg by mouth every morning. traZODone (DESYREL) 50 MG tablet Take 50 mg by mouth nightly. docusate sodium (COLACE) 100 MG capsule Take 1 capsule by mouth 2 times daily as needed for Constipation, Disp-28 capsule, R-0Normal      ondansetron (ZOFRAN) 4 MG/2ML injection Infuse 2 mLs intravenously once as needed for Nausea for 1 dose., Disp-15 mL, R-0             ALLERGIES     is allergic to ampicillin, bee venom, codeine, morphine, and adhesive tape. FAMILY HISTORY     has no family status information on file. family history is not on file. SOCIAL HISTORY      reports that she has never smoked. She has never used smokeless tobacco. She reports that she does not drink alcohol and does not use drugs. PHYSICAL EXAM     INITIAL VITALS:  height is 5' 2\" (1.575 m) and weight is 111.6 kg (246 lb). Her oral temperature is 99.7 °F (37.6 °C). Her blood pressure is 144/87 (abnormal) and her pulse is 97. Her respiration is 18 and oxygen saturation is 96%. Physical Exam  Vitals reviewed. Constitutional:       General: She is not in acute distress. Appearance: She is well-developed. She is not ill-appearing or toxic-appearing. HENT:      Head: Normocephalic and atraumatic. Right Ear: External ear normal.      Left Ear: External ear normal.   Eyes:      General: Lids are normal.   Neck:      Trachea: No tracheal deviation. Cardiovascular:      Rate and Rhythm: Normal rate and regular rhythm. Pulmonary:      Effort: Pulmonary effort is normal. No respiratory distress. Breath sounds: Normal breath sounds. Abdominal:      Palpations: Abdomen is soft.       Tenderness: There is no abdominal tenderness. Musculoskeletal:      Comments: Right knee has no significant edema. No skin lesions or erythema. Range of motion is slightly limited in full flexion. Mild tenderness to the anterior knee. No obvious tendon/ligament dysfunction on my exam.  Normal distal pulses, motor and sensation. Rest of leg/knee exam is unremarkable. Skin:     General: Skin is warm and dry. Neurological:      Mental Status: She is alert. GCS: GCS eye subscore is 4. GCS verbal subscore is 5. GCS motor subscore is 6. Psychiatric:         Speech: Speech normal.           DIFFERENTIAL DIAGNOSIS/ MDM:     Plan to be to image the knee. DIAGNOSTIC RESULTS     EKG: All EKG's are interpreted by the Emergency Department Physician who either signs or Co-signs this chart in the absence of a cardiologist.        RADIOLOGY:   Interpretation per the Radiologist below, if available at the time of this note:  XR KNEE RIGHT (3 VIEWS)   Final Result   Unremarkable right knee. XR SHOULDER RIGHT (MIN 2 VIEWS)    Result Date: 5/30/2021  EXAMINATION: THREE XRAY VIEWS OF THE RIGHT SHOULDER 5/30/2021 2:41 am COMPARISON: None. HISTORY: ORDERING SYSTEM PROVIDED HISTORY: pain TECHNOLOGIST PROVIDED HISTORY: pain Reason for Exam: Pain to the right shoulder and upper back sudden onset prior to arrival w/ no known injury Acuity: Acute Type of Exam: Initial FINDINGS: Glenohumeral joint is normally aligned. No evidence of acute fracture or dislocation. No abnormal periarticular calcifications. The [facility] [place] operated by [facility] joint is unremarkable in appearance. Visualized lung is unremarkable. No acute abnormality. XR ABDOMEN (KUB) (SINGLE AP VIEW)    Result Date: 6/5/2021  EXAMINATION: ONE SUPINE XRAY VIEW(S) OF THE ABDOMEN 6/5/2021 12:04 pm COMPARISON: March 23, 2010 HISTORY: ORDERING SYSTEM PROVIDED HISTORY: Constipation TECHNOLOGIST PROVIDED HISTORY: Constipation Reason for Exam: Pt. C/o constipation.   States she had a advised to follow-up with her PCP return right away if worsening or for new or concerning symptoms. I do not feel she has septic arthritis either at this time. Skin exam was normal without evidence of infection or compartment syndrome. I discussed this with the patient and she is agreeable with the plan and knows to return right away if worse or for new or concerning symptoms. The patient presented with knee pain. A fracture was not detected on X-ray. The foot/ankle and hip joints were not affected. No skin lesions were seen. There are no signs of compartment syndrome. The pulses are 2/4. The motor is 5/5. The sensation is intact. The patient was advised to return to the Emergency Department for increasing pain, numbness, weakness, or coldness to the extremity. The patient was further instructed to follow up in 2-3 days with their family doctor or orthopedics. The patient voiced understanding of these instructions. The patient understands that at this time there is no evidence for a more malignant underlying process, but the patient also understands that early in the process of an illness or injury, an emergency department workup can be falsely reassuring. Routine discharge counseling was given, and the patient understands that worsening, changing or persistent symptoms should prompt an immediate call or follow up with their primary physician or return to the emergency department. The importance of appropriate follow up was also discussed. I have reviewed the disposition diagnosis with the patient and or their family/guardian. I have answered their questions and given discharge instructions. They voiced understanding of these instructions and did not have any further questions or complaints. CONSULTS:    None    CRITICAL CARE:     None    PROCEDURES:    None    FINAL IMPRESSION      1.  Sprain of right knee, unspecified ligament, initial encounter          DISPOSITION/PLAN   DISPOSITION Decision To Discharge 06/19/2021 01:06:09 AM      Condition on Disposition    Improved    PATIENT REFERRED TO:  Elias De Souza [facility] [address]  [phone]    Schedule an appointment as soon as possible for a visit in 3 days        DISCHARGE MEDICATIONS:  Discharge Medication List as of 6/19/2021  1:06 AM          (Please note that portions of this note were completed with a voice recognition program.  Efforts were made to edit the dictations but occasionally words are mis-transcribed.)    Rocio Watkins DO, DO  Attending Emergency Physician       Rocio Watkins DO  06/19/21 0238

## 2021-07-04 ENCOUNTER — HOSPITAL ENCOUNTER (EMERGENCY)
Facility: CLINIC | Age: 41
Discharge: HOME | End: 2021-07-04
Attending: EMERGENCY MEDICINE
Payer: COMMERCIAL

## 2021-07-04 VITALS
RESPIRATION RATE: 20 BRPM | TEMPERATURE: 98.6 F | HEART RATE: 96 BPM | DIASTOLIC BLOOD PRESSURE: 95 MMHG | BODY MASS INDEX: 43.06 KG/M2 | WEIGHT: 234 LBS | HEIGHT: 62 IN | SYSTOLIC BLOOD PRESSURE: 140 MMHG | OXYGEN SATURATION: 98 %

## 2021-07-04 DIAGNOSIS — L60.2 OVERGROWN TOENAILS: Primary | ICD-10-CM

## 2021-07-04 PROCEDURE — 99283 EMERGENCY DEPT VISIT LOW MDM: CPT

## 2021-07-04 ASSESSMENT — PAIN SCALES - GENERAL: PAINLEVEL_OUTOF10: 10

## 2021-07-04 NOTE — ED PROVIDER NOTES
[address] [facility] ED  EMERGENCY DEPARTMENT ENCOUNTER      Pt Name: Lelo Mcnamara  MRN: [id number]  [place] 1980  Date of evaluation: 7/4/2021  Provider: Chris Lawson MD    CHIEF COMPLAINT     Chief Complaint   Patient presents with    Toe Pain     c/o right 2nd toe pain for unknown amount of time, no injury/trauma         HISTORY OF PRESENT ILLNESS   (Location/Symptom, Timing/Onset, Context/Setting,Quality, Duration, Modifying Factors, Severity)  Note limiting factors. Lelo Mcnamara is a 41 y.o. female who presents to the emergency department with chief complaint of pain in the distal aspect of the right second toe from an overgrown toenail of undetermined duration. She denies other complaints. The history is provided by the patient and medical records. Nursing Notes werereviewed. REVIEW OF SYSTEMS    (2-9 systems for level 4, 10 or more for level 5)     Review of Systems   Unable to perform ROS: Psychiatric disorder       Except as noted above the remainder of the review of systems was reviewed and negative.        PAST MEDICAL HISTORY     Past Medical History:   Diagnosis Date    Asthma     history    Bipolar affect, depressed ([facility] [address]     ON MEDS    Environmental allergies     GERD (gastroesophageal reflux disease)     Lumbar pain     PVD (peripheral vascular disease) ([facility])          SURGICALHISTORY       Past Surgical History:   Procedure Laterality Date    DENTAL SURGERY  07/17/2013    extraction   x  2  teeth    DILATION AND CURETTAGE OF UTERUS  2010    WISDOM TOOTH EXTRACTION           CURRENT MEDICATIONS       Previous Medications    ALBUTEROL (PROVENTIL) (2.5 MG/3ML) 0.083% NEBULIZER SOLUTION    Take 3 mLs by nebulization every 6 hours as needed for Wheezing    ALBUTEROL SULFATE  (90 BASE) MCG/ACT INHALER    Inhale 2 puffs into the lungs every 6 hours as needed for Wheezing or Shortness of Breath Indications: Wheezing    CITALOPRAM (CELEXA) 40 MG TABLET    Take 40 mg by mouth every morning. IBUPROFEN (ADVIL;MOTRIN) 800 MG TABLET    Take 1 tablet by mouth every 8 hours as needed for Pain    IBUPROFEN (ADVIL;MOTRIN) 800 MG TABLET    Take 1 tablet by mouth every 8 hours as needed for Pain    ONDANSETRON (ZOFRAN) 4 MG/2ML INJECTION    Infuse 2 mLs intravenously once as needed for Nausea for 1 dose. TRAZODONE (DESYREL) 50 MG TABLET    Take 50 mg by mouth nightly. ALLERGIES     Ampicillin, Bee venom, Codeine, Morphine, and Adhesive tape    FAMILY HISTORY     History reviewed. No pertinent family history. SOCIAL HISTORY       Social History     Socioeconomic History    Marital status: Single     Spouse name: None    Number of children: None    Years of education: None    Highest education level: None   Occupational History    None   Tobacco Use    Smoking status: Never Smoker    Smokeless tobacco: Never Used   Vaping Use    Vaping Use: Never used   Substance and Sexual Activity    Alcohol use: No    Drug use: No    Sexual activity: None   Other Topics Concern    None   Social History Narrative    None     Social Determinants of Health     Financial Resource Strain:     Difficulty of Paying Living Expenses:    Food Insecurity:     Worried About Running Out of Food in the Last Year:     Ran Out of Food in the Last Year:    Transportation Needs:     Lack of Transportation (Medical):      Lack of Transportation (Non-Medical):    Physical Activity:     Days of Exercise per Week:     Minutes of Exercise per Session:    Stress:     Feeling of Stress :    Social Connections:     Frequency of Communication with Friends and Family:     Frequency of Social Gatherings with Friends and Family:     Attends Religion Services:     Active Member of Clubs or Organizations:     Attends Club or Organization Meetings:     Marital Status:    Intimate Partner Violence:     Fear of Current or Ex-Partner:     Emotionally Abused:     Physically Abused:     Sexually Abused:        SCREENINGS             PHYSICAL EXAM    (up to 7 for level 4, 8 or more for level 5)     ED Triage Vitals [07/04/21 1821]   BP Temp Temp src Pulse Resp SpO2 Height Weight   (!) 140/95 98.6 °F (37 °C) -- 96 20 98 % 5' 2\" (1.575 m) 234 lb (106.1 kg)       Physical Exam  Vitals reviewed. Constitutional:       General: She is not in acute distress. Appearance: She is obese. Musculoskeletal:      Comments: Both feet are warm with intact pulses. Patient has an overgrown curved toenail in the right second toe that is digging into the skin but not causing any infection. This is quite cornified. There is no surrounding redness. Skin:     General: Skin is warm and dry. Neurological:      Mental Status: She is alert. DIAGNOSTIC RESULTS     EKG: All EKG's are interpreted by the Emergency Department Physician who either signs orCo-signs this chart in the absence of a cardiologist.    RADIOLOGY:     Interpretation per the Radiologist below, ifavailable at the time of this note:    No orders to display         ED BEDSIDE ULTRASOUND:   Performed by ED Physician - none    LABS:  Labs Reviewed - No data to display    All other labs were within normal range ornot returned as of this dictation. EMERGENCY DEPARTMENT COURSE and DIFFERENTIAL DIAGNOSIS/MDM:   Vitals:    Vitals:    07/04/21 1821   BP: (!) 140/95   Pulse: 96   Resp: 20   Temp: 98.6 °F (37 °C)   SpO2: 98%   Weight: 106.1 kg (234 lb)   Height: 5' 2\" (1.575 m)            The plan is to refer the patient to outpatient podiatry for trimming of her nails. MDM    CONSULTS:  None    PROCEDURES:  Unlessotherwise noted below, none     Procedures    FINAL IMPRESSION      1.  Overgrown toenails          DISPOSITION/PLAN   DISPOSITION Decision To Discharge 07/04/2021 06:36:39 PM      PATIENT REFERRED TO:  Anayh Patel, [facility] [address]  [address]  [place]  [phone]            DISCHARGE MEDICATIONS:         Problem List:  There is no problem list on file for this patient. Summation      Patient Course: Discharged. ED Medicationsadministered this visit:  Medications - No data to display    New Prescriptions from this visit:    New Prescriptions    No medications on file       Follow-up:  Nnamdi Mirza, [address] [place] [address] [address]  [place]  [phone]              Final Impression:   1.  Overgrown toenails               (Please note that portions of this note were completed with a voice recognitionprogram.  Efforts were made to edit the dictations but occasionally words are mis-transcribed.)    Claudia Pennington MD (electronically signed)  Attending Emergency Physician            Claudia Pennington MD  07/04/21 5542

## 2021-07-04 NOTE — ED NOTES
Right second toe pain,  No injury/trauma,  Pt nail grown over top toe and wrapping around to plantar surface toe, skin arthur Edwards RN  07/04/21 9055

## 2021-07-13 ENCOUNTER — HOSPITAL ENCOUNTER (EMERGENCY)
Facility: CLINIC | Age: 41
Discharge: HOME | End: 2021-07-13
Attending: EMERGENCY MEDICINE
Payer: COMMERCIAL

## 2021-07-13 ENCOUNTER — APPOINTMENT (OUTPATIENT)
Dept: GENERAL RADIOLOGY | Facility: CLINIC | Age: 41
End: 2021-07-13
Payer: COMMERCIAL

## 2021-07-13 VITALS
TEMPERATURE: 98 F | DIASTOLIC BLOOD PRESSURE: 87 MMHG | RESPIRATION RATE: 17 BRPM | HEIGHT: 62 IN | BODY MASS INDEX: 50.79 KG/M2 | HEART RATE: 84 BPM | WEIGHT: 276 LBS | SYSTOLIC BLOOD PRESSURE: 118 MMHG | OXYGEN SATURATION: 97 %

## 2021-07-13 DIAGNOSIS — S93.401A SPRAIN OF RIGHT ANKLE, UNSPECIFIED LIGAMENT, INITIAL ENCOUNTER: Primary | ICD-10-CM

## 2021-07-13 PROCEDURE — 73610 X-RAY EXAM OF ANKLE: CPT

## 2021-07-13 PROCEDURE — 99285 EMERGENCY DEPT VISIT HI MDM: CPT

## 2021-07-13 RX ORDER — IBUPROFEN 800 MG/1
800 TABLET ORAL EVERY 8 HOURS PRN
Qty: 30 TABLET | Refills: 0 | Status: SHIPPED | OUTPATIENT
Start: 2021-07-13 | End: 2021-10-24 | Stop reason: ALTCHOICE

## 2021-07-13 ASSESSMENT — PAIN SCALES - GENERAL: PAINLEVEL_OUTOF10: 9

## 2021-07-13 ASSESSMENT — PAIN DESCRIPTION - PAIN TYPE: TYPE: ACUTE PAIN

## 2021-07-13 ASSESSMENT — PAIN DESCRIPTION - LOCATION: LOCATION: ANKLE

## 2021-07-13 ASSESSMENT — PAIN DESCRIPTION - ORIENTATION: ORIENTATION: RIGHT

## 2021-10-24 ENCOUNTER — HOSPITAL ENCOUNTER (EMERGENCY)
Age: 41
Discharge: HOME | End: 2021-10-24
Attending: STUDENT IN AN ORGANIZED HEALTH CARE EDUCATION/TRAINING PROGRAM
Payer: COMMERCIAL

## 2021-10-24 VITALS
DIASTOLIC BLOOD PRESSURE: 85 MMHG | OXYGEN SATURATION: 98 % | BODY MASS INDEX: 44.9 KG/M2 | RESPIRATION RATE: 16 BRPM | TEMPERATURE: 97.9 F | WEIGHT: 244 LBS | HEIGHT: 62 IN | HEART RATE: 78 BPM | SYSTOLIC BLOOD PRESSURE: 125 MMHG

## 2021-10-24 DIAGNOSIS — Z20.822 SUSPECTED COVID-19 VIRUS INFECTION: ICD-10-CM

## 2021-10-24 DIAGNOSIS — J02.9 VIRAL PHARYNGITIS: Primary | ICD-10-CM

## 2021-10-24 LAB
DIRECT EXAM: NORMAL
Lab: NORMAL
SARS-COV-2: NORMAL
SARS-COV-2: NOT DETECTED
SOURCE: NORMAL
SPECIMEN DESCRIPTION: NORMAL

## 2021-10-24 PROCEDURE — 99283 EMERGENCY DEPT VISIT LOW MDM: CPT

## 2021-10-24 PROCEDURE — 6360000002 HC RX W HCPCS: Performed by: STUDENT IN AN ORGANIZED HEALTH CARE EDUCATION/TRAINING PROGRAM

## 2021-10-24 PROCEDURE — U0005 INFEC AGEN DETEC AMPLI PROBE: HCPCS

## 2021-10-24 PROCEDURE — 87880 STREP A ASSAY W/OPTIC: CPT

## 2021-10-24 PROCEDURE — U0003 INFECTIOUS AGENT DETECTION BY NUCLEIC ACID (DNA OR RNA); SEVERE ACUTE RESPIRATORY SYNDROME CORONAVIRUS 2 (SARS-COV-2) (CORONAVIRUS DISEASE [COVID-19]), AMPLIFIED PROBE TECHNIQUE, MAKING USE OF HIGH THROUGHPUT TECHNOLOGIES AS DESCRIBED BY CMS-2020-01-R: HCPCS

## 2021-10-24 RX ORDER — PREDNISONE 10 MG/1
TABLET ORAL
Qty: 20 TABLET | Refills: 0 | Status: SHIPPED | OUTPATIENT
Start: 2021-10-24 | End: 2021-10-24

## 2021-10-24 RX ORDER — GUAIFENESIN/DEXTROMETHORPHAN 100-10MG/5
5 SYRUP ORAL 3 TIMES DAILY PRN
Qty: 120 ML | Refills: 0 | Status: SHIPPED | OUTPATIENT
Start: 2021-10-24 | End: 2021-11-03

## 2021-10-24 RX ORDER — DEXAMETHASONE SODIUM PHOSPHATE 10 MG/ML
10 INJECTION, SOLUTION INTRAMUSCULAR; INTRAVENOUS ONCE
Status: COMPLETED | OUTPATIENT
Start: 2021-10-24 | End: 2021-10-24

## 2021-10-24 RX ORDER — IBUPROFEN 600 MG/1
600 TABLET ORAL EVERY 6 HOURS PRN
Qty: 30 TABLET | Refills: 0 | Status: SHIPPED | OUTPATIENT
Start: 2021-10-24 | End: 2022-09-10

## 2021-10-24 RX ORDER — ACETAMINOPHEN 500 MG
1000 TABLET ORAL EVERY 6 HOURS PRN
Qty: 30 TABLET | Refills: 0 | Status: SHIPPED | OUTPATIENT
Start: 2021-10-24 | End: 2022-09-10

## 2021-10-24 RX ADMIN — DEXAMETHASONE SODIUM PHOSPHATE 10 MG: 10 INJECTION, SOLUTION INTRAMUSCULAR; INTRAVENOUS at 04:00

## 2021-10-24 ASSESSMENT — ENCOUNTER SYMPTOMS
COLOR CHANGE: 0
SHORTNESS OF BREATH: 0
SORE THROAT: 1
EYE DISCHARGE: 0
ABDOMINAL PAIN: 0
EYE REDNESS: 0
RHINORRHEA: 1

## 2021-10-24 NOTE — ED PROVIDER NOTES
[facility]  Emergency Department Encounter     Pt Name: Santo Flores  MRN: [id number]  [place] 1980  Date of evaluation: 10/24/21  PCP:  Skip Irving    CHIEF COMPLAINT       Chief Complaint   Patient presents with    Pharyngitis     onset a couple hours ago       HISTORY OFPRESENT ILLNESS  (Location/Symptom, Timing/Onset, Context/Setting, Quality, Duration, Modifying Mushtaq Anderson.)      Santo Flores is a 35 y.o. female who presents with sore throat, cough, runny nose. Does endorse recent sick contact tested positive for Covid approximately a week ago. States sore throat is mild. Worse in the morning. Throbbing. Denies any difficulty breathing, chest pain, nausea, vomiting. PAST MEDICAL / SURGICAL / SOCIAL / FAMILY HISTORY      has a past medical history of Asthma, Bipolar affect, depressed ([facility] [address] Environmental allergies, GERD (gastroesophageal reflux disease), Lumbar pain, and PVD (peripheral vascular disease) ([facility] [address] has a past surgical history that includes Dilation and curettage of uterus (2010); [place] tooth extraction; and Dental surgery (07/17/2013).     Social History     Socioeconomic History    Marital status: Single     Spouse name: Not on file    Number of children: Not on file    Years of education: Not on file    Highest education level: Not on file   Occupational History    Not on file   Tobacco Use    Smoking status: Never Smoker    Smokeless tobacco: Never Used   Vaping Use    Vaping Use: Never used   Substance and Sexual Activity    Alcohol use: No    Drug use: No    Sexual activity: Not on file   Other Topics Concern    Not on file   Social History Narrative    Not on file     Social Determinants of Health     Financial Resource Strain:     Difficulty of Paying Living Expenses:    Food Insecurity:     Worried About Running Out of Food in the Last Year:     [address] in the Last Year:    Transportation Needs:     Lack of Transportation (Medical):  Lack of Transportation (Non-Medical):    Physical Activity:     Days of Exercise per Week:     Minutes of Exercise per Session:    Stress:     Feeling of Stress :    Social Connections:     Frequency of Communication with Friends and Family:     Frequency of Social Gatherings with Friends and Family:     Attends Nondenominational Services:     Active Member of Clubs or Organizations:     Attends Club or Organization Meetings:     Marital Status:    Intimate Partner Violence:     Fear of Current or Ex-Partner:     Emotionally Abused:     Physically Abused:     Sexually Abused:        History reviewed. No pertinent family history. Allergies:  Latex, Ampicillin, Bee venom, Codeine, Morphine, and Adhesive tape    Home Medications:  Prior to Admission medications    Medication Sig Start Date End Date Taking? Authorizing Provider   acetaminophen (APAP EXTRA STRENGTH) 500 MG tablet Take 2 tablets by mouth every 6 hours as needed for Pain 10/24/21  Yes Emerald Barragan,    ibuprofen (ADVIL;MOTRIN) 600 MG tablet Take 1 tablet by mouth every 6 hours as needed for Pain 10/24/21  Yes Emerald Barragan,    guaiFENesin-dextromethorphan (ROBITUSSIN DM) 100-10 MG/5ML syrup Take 5 mLs by mouth 3 times daily as needed for Cough 10/24/21 11/3/21 Yes Keith Gomez, DO   Benzocaine-Menthol (CEPACOL) 6-10 MG LOZG lozenge Take 1 lozenge by mouth every 2 hours as needed for Sore Throat 10/24/21  Yes Emerald Barragan,    citalopram (CELEXA) 40 MG tablet Take 40 mg by mouth every morning. Yes Historical Provider, MD   traZODone (DESYREL) 50 MG tablet Take 50 mg by mouth nightly.    Yes Historical Provider, MD   albuterol sulfate  (90 Base) MCG/ACT inhaler Inhale 2 puffs into the lungs every 6 hours as needed for Wheezing or Shortness of Breath Indications: Wheezing 5/21/21   Neel Irene MD   albuterol (PROVENTIL) (2.5 MG/3ML) 0.083% nebulizer solution Take 3 mLs by nebulization every 6 hours as needed for Wheezing 5/21/21   Gretel Posey MD   [facility]ansetron [facility]) 4 MG/2ML injection Infuse 2 mLs intravenously once as needed for Nausea for 1 dose. 7/17/13   Daxa Noonan DDS       REVIEW OF SYSTEMS    (2-9 systems for level 4, 10 or more for level 5)      Review of Systems   Constitutional: Negative for chills and fever. HENT: Positive for congestion, rhinorrhea and sore throat. Eyes: Negative for discharge and redness. Respiratory: Negative for shortness of breath. Cardiovascular: Negative for chest pain. Gastrointestinal: Negative for abdominal pain. Genitourinary: Negative for flank pain. Musculoskeletal: Negative for myalgias. Skin: Negative for color change and rash. Allergic/Immunologic: Positive for environmental allergies. Neurological: Negative for headaches. Psychiatric/Behavioral: Negative for agitation and confusion. PHYSICAL EXAM   (up to 7 for level 4, 8 or more for level 5)     INITIAL VITALS:    height is 5' 2\" (1.575 m) and weight is 244 lb (110.7 kg). Her oral temperature is 97.9 °F (36.6 °C). Her blood pressure is 125/85 and her pulse is 78. Her respiration is 16 and oxygen saturation is 98%. Physical Exam  Vitals and nursing note reviewed. Constitutional:       Appearance: She is well-developed. HENT:      Head: Normocephalic and atraumatic. Nose: Nose normal.      Mouth/Throat:      Mouth: Mucous membranes are moist.      Comments: Uvula midline, mild injection, no exudates  Eyes:      General: No scleral icterus. Conjunctiva/sclera: Conjunctivae normal.      Pupils: Pupils are equal, round, and reactive to light. Neck:      Trachea: No tracheal deviation. Cardiovascular:      Rate and Rhythm: Normal rate and regular rhythm. Heart sounds: Normal heart sounds. No murmur heard. No friction rub. No gallop. Pulmonary:      Effort: Pulmonary effort is normal. No respiratory distress.       Breath sounds: Normal breath sounds. No wheezing or rales. Abdominal:      General: There is no distension. Palpations: Abdomen is soft. There is no mass. Tenderness: There is no abdominal tenderness. There is no guarding. Hernia: No hernia is present. Musculoskeletal:         General: Normal range of motion. Cervical back: Neck supple. Skin:     General: Skin is warm and dry. Findings: No erythema or rash. Neurological:      Mental Status: She is alert and oriented to person, place, and time. Psychiatric:         Behavior: Behavior normal.         DIFFERENTIAL  DIAGNOSIS     PLAN (LABS / IMAGING / EKG):  Orders Placed This Encounter   Procedures    Strep Gr A Direct Ag    COVID-19       MEDICATIONS ORDERED:  Orders Placed This Encounter   Medications    acetaminophen (APAP EXTRA STRENGTH) 500 MG tablet     Sig: Take 2 tablets by mouth every 6 hours as needed for Pain     Dispense:  30 tablet     Refill:  0    ibuprofen (ADVIL;MOTRIN) 600 MG tablet     Sig: Take 1 tablet by mouth every 6 hours as needed for Pain     Dispense:  30 tablet     Refill:  0    DISCONTD: benzocaine (ORAJEL) 10 % mucosal gel     Sig: Apply to affected tooth up to 4 times a day as needed for pain     Dispense:  9 g     Refill:  0    DISCONTD: predniSONE (DELTASONE) 10 MG tablet     Sig: Take 4 tablets by mouth once daily for 5 days     Dispense:  20 tablet     Refill:  0    guaiFENesin-dextromethorphan (ROBITUSSIN DM) 100-10 MG/5ML syrup     Sig: Take 5 mLs by mouth 3 times daily as needed for Cough     Dispense:  120 mL     Refill:  0    dexamethasone (PF) (DECADRON) injection 10 mg    Benzocaine-Menthol (CEPACOL) 6-10 MG LOZG lozenge     Sig: Take 1 lozenge by mouth every 2 hours as needed for Sore Throat     Dispense:  30 lozenge     Refill:  0       DDX: Strep versus viral pharyngitis versus Covid    Initial MDM/Plan: 35 y.o. female who presents with runny nose, cough, recent sick contacts tested positive for Covid.   We will send PCR Covid testing. Strep test however unlikely. Symptomatic treatment otherwise. DIAGNOSTIC RESULTS / EMERGENCY DEPARTMENT COURSE / MDM     LABS:  Labs Reviewed   STREP SCREEN GROUP A THROAT   COVID-19         RADIOLOGY:  No results found. EMERGENCY DEPARTMENT COURSE:  Strep testing negative. Will treat symptomatically. Encourage patient to follow-up with a primary care doctor return for worsening signs or symptoms. · Based on the low acuity of concerning symptoms and improvement of symptoms, patient will be discharged with follow up and prescription information listed in the Disposition section. · Patient states they will follow-up with primary care physician and/or return to the emergency department should they experience a change or worsening of symptoms. · Patient will be discharged with resources: summary of visit, instructions, follow-up information, prescriptions if necessary. · Patient/ family instructed to read discharge paperwork. All of their questions and concerns were addressed. · Suspicion for any acute life-threatening processes is low. Patient voices understanding of plan. PROCEDURES:  None    CONSULTS:  None    CRITICAL CARE:  0    FINAL IMPRESSION      1. Viral pharyngitis    2.  Suspected COVID-19 virus infection          DISPOSITION / PLAN     DISPOSITION Decision To Discharge 10/24/2021 03:47:19 AM        PATIENTREFERRED TO:  Ameena Ramirez  [address] [place] [address]  [phone]    Schedule an appointment as soon as possible for a visit         DISCHARGE MEDICATIONS:  Discharge Medication List as of 10/24/2021  3:54 AM      START taking these medications    Details   acetaminophen (APAP EXTRA STRENGTH) 500 MG tablet Take 2 tablets by mouth every 6 hours as needed for Pain, Disp-30 tablet, R-0Print      ibuprofen (ADVIL;MOTRIN) 600 MG tablet Take 1 tablet by mouth every 6 hours as needed for Pain, Disp-30 tablet, R-0Print guaiFENesin-dextromethorphan (ROBITUSSIN DM) 100-10 MG/5ML syrup Take 5 mLs by mouth 3 times daily as needed for Cough, Disp-120 mL, R-0Print      Benzocaine-Menthol (CEPACOL) 6-10 MG LOZG lozenge Take 1 lozenge by mouth every 2 hours as needed for Sore Throat, Disp-30 lozenge, R-0Print             Nuzhat January, DO  EmergencyMedicine Attending    (Please note that portions of this note were completed with a voice recognition program.  Efforts were made to edit the dictations but occasionally words are mis-transcribed.)       Nuzhat January, DO  10/24/21 1014

## 2022-01-01 ENCOUNTER — APPOINTMENT (OUTPATIENT)
Dept: GENERAL RADIOLOGY | Age: 42
End: 2022-01-01
Payer: COMMERCIAL

## 2022-01-01 ENCOUNTER — HOSPITAL ENCOUNTER (EMERGENCY)
Age: 42
Discharge: HOME | End: 2022-01-01
Attending: EMERGENCY MEDICINE
Payer: COMMERCIAL

## 2022-01-01 VITALS
SYSTOLIC BLOOD PRESSURE: 135 MMHG | BODY MASS INDEX: 50.02 KG/M2 | WEIGHT: 293 LBS | RESPIRATION RATE: 12 BRPM | TEMPERATURE: 96.7 F | HEART RATE: 75 BPM | OXYGEN SATURATION: 96 % | HEIGHT: 64 IN | DIASTOLIC BLOOD PRESSURE: 87 MMHG

## 2022-01-01 DIAGNOSIS — S91.209A NAIL AVULSION OF TOE, INITIAL ENCOUNTER: Primary | ICD-10-CM

## 2022-01-01 PROCEDURE — 73630 X-RAY EXAM OF FOOT: CPT

## 2022-01-01 PROCEDURE — 99283 EMERGENCY DEPT VISIT LOW MDM: CPT

## 2022-01-01 ASSESSMENT — PAIN DESCRIPTION - PAIN TYPE: TYPE: ACUTE PAIN

## 2022-01-01 ASSESSMENT — ENCOUNTER SYMPTOMS
VOICE CHANGE: 0
APNEA: 0
SHORTNESS OF BREATH: 0
EYES NEGATIVE: 1
TROUBLE SWALLOWING: 0
GASTROINTESTINAL NEGATIVE: 1

## 2022-01-01 ASSESSMENT — PAIN DESCRIPTION - LOCATION: LOCATION: TOE (COMMENT WHICH ONE)

## 2022-01-01 ASSESSMENT — PAIN DESCRIPTION - FREQUENCY: FREQUENCY: CONTINUOUS

## 2022-01-01 ASSESSMENT — PAIN DESCRIPTION - DESCRIPTORS: DESCRIPTORS: CONSTANT

## 2022-01-01 ASSESSMENT — PAIN SCALES - GENERAL: PAINLEVEL_OUTOF10: 6

## 2022-01-01 ASSESSMENT — PAIN DESCRIPTION - ORIENTATION: ORIENTATION: LEFT

## 2022-01-01 NOTE — ED PROVIDER NOTES
[facility]  ED  Emergency Department Encounter  Emergency Medicine Resident     Pt Name: Rishi Hardy  BZA:[id number]  [place] 1980  Date of evaluation: 1/1/22  PCP:  Linda Wilkerson    CHIEF COMPLAINT       Chief Complaint   Patient presents with    Toe Pain     great toe right foot, nail is discolored-bruised in appearance with dried red drainage noted around nail bed. Pt denied known cause. \"It happened last night, I don't know\". HISTORY OF PRESENT ILLNESS  (Location/Symptom, Timing/Onset, Context/Setting, Quality, Duration, ModifyingFactors, Severity.)      Rishi Hardy is a 35 y.o. female presents emergency department for evaluation of right great toe nail avulsion. Patient states that she has no memory of how she received her injury but that it happened last night when she was walking barefoot. Patient states that she is a mild bit of pain when the nail is manipulated but otherwise states she feels normal.  Patient is in no current distress or otherwise uncomfortable feeling. PAST MEDICAL / SURGICAL / SOCIAL /FAMILY HISTORY      has a past medical history of Asthma, Bipolar affect, depressed ([address] Environmental allergies, GERD (gastroesophageal reflux disease), Lumbar pain, and PVD (peripheral vascular disease) ([address] No other pertinent PMH on review with patient/guardian. has a past surgical history that includes Dilation and curettage of uterus (2010); [place] tooth extraction; and Dental surgery (07/17/2013). No other pertinent PSH on review with patient/guardian.   Social History     Socioeconomic History    Marital status: Single     Spouse name: Not on file    Number of children: Not on file    Years of education: Not on file    Highest education level: Not on file   Occupational History    Not on file   Tobacco Use    Smoking status: Never Smoker    Smokeless tobacco: Never Used   Vaping Use    Vaping Use: Never used   Substance and Sexual Activity    Alcohol use: No    Drug use: No    Sexual activity: Not on file   Other Topics Concern    Not on file   Social History Narrative    Not on file     Social Determinants of Health     Financial Resource Strain:     Difficulty of Paying Living Expenses: Not on file   Food Insecurity:     Worried About Running Out of Food in the Last Year: Not on file    Mike of Food in the Last Year: Not on file   Transportation Needs:     Lack of Transportation (Medical): Not on file    Lack of Transportation (Non-Medical): Not on file   Physical Activity:     Days of Exercise per Week: Not on file    Minutes of Exercise per Session: Not on file   Stress:     Feeling of Stress : Not on file   Social Connections:     Frequency of Communication with Friends and Family: Not on file    Frequency of Social Gatherings with Friends and Family: Not on file    Attends Caodaism Services: Not on file    Active Member of [address] [place] [address] [employer] or Organizations: Not on file    Attends Club or Organization Meetings: Not on file    Marital Status: Not on file   Intimate Partner Violence:     Fear of Current or Ex-Partner: Not on file    Emotionally Abused: Not on file    Physically Abused: Not on file    Sexually Abused: Not on file   Housing Stability:     Unable to Pay for Housing in the Last Year: Not on file    Number of [place] in the Last Year: Not on file    Unstable Housing in the Last Year: Not on file       I counseled the patient against using tobacco products. History reviewed. No pertinent family history. No other pertinent FamHx on review with patient/guardian. Allergies:  Latex, Ampicillin, Bee venom, Codeine, Morphine, and Adhesive tape    Home Medications:  Prior to Admission medications    Medication Sig Start Date End Date Taking?  Authorizing Provider   ibuprofen (ADVIL;MOTRIN) 600 MG tablet Take 1 tablet by mouth every 6 hours as needed for Pain 10/24/21  Yes Vick Brennan, DO acetaminophen (APAP EXTRA STRENGTH) 500 MG tablet Take 2 tablets by mouth every 6 hours as needed for Pain 10/24/21   Rhonda Meter, DO   Benzocaine-Menthol (CEPACOL) 6-10 MG LOZG lozenge Take 1 lozenge by mouth every 2 hours as needed for Sore Throat 10/24/21   Rhonda Meter, DO   albuterol sulfate  (90 Base) MCG/ACT inhaler Inhale 2 puffs into the lungs every 6 hours as needed for Wheezing or Shortness of Breath Indications: Wheezing 5/21/21   Inga Blackwood MD   albuterol (PROVENTIL) (2.5 MG/3ML) 0.083% nebulizer solution Take 3 mLs by nebulization every 6 hours as needed for Wheezing 5/21/21   Inga Blackwood MD   ondansetron [facility]) 4 MG/2ML injection Infuse 2 mLs intravenously once as needed for Nausea for 1 dose. 7/17/13   Beny Segura DDS   citalopram (CELEXA) 40 MG tablet Take 40 mg by mouth every morning. Historical Provider, MD   traZODone (DESYREL) 50 MG tablet Take 50 mg by mouth nightly. Historical Provider, MD       REVIEW OF SYSTEMS    (2-9 systems for level 4, 10 ormore for level 5)      Review of Systems   Constitutional: Negative for activity change, appetite change and fatigue. HENT: Negative for congestion, trouble swallowing and voice change. Eyes: Negative. Respiratory: Negative for apnea and shortness of breath. Cardiovascular: Negative. Gastrointestinal: Negative. Musculoskeletal: Negative. Skin: Negative. Neurological: Negative. Psychiatric/Behavioral: Negative. PHYSICAL EXAM   (up to 7 for level 4, 8 or more for level 5)      INITIAL VITALS:   /87   Pulse 75   Temp 96.7 °F (35.9 °C) (Oral)   Resp 12   Ht 5' 4\" (1.626 m)   Wt (!) 350 lb (158.8 kg)   SpO2 96%   BMI 60.08 kg/m²     Physical Exam  Constitutional:       Appearance: Normal appearance. HENT:      Head: Normocephalic and atraumatic. Nose: Nose normal.      Mouth/Throat:      Mouth: Mucous membranes are moist.      Pharynx: Oropharynx is clear.    Eyes: Extraocular Movements: Extraocular movements intact. Conjunctiva/sclera: Conjunctivae normal.      Pupils: Pupils are equal, round, and reactive to light. Cardiovascular:      Rate and Rhythm: Normal rate and regular rhythm. Pulses: Normal pulses. Heart sounds: Normal heart sounds. Pulmonary:      Effort: Pulmonary effort is normal.      Breath sounds: Normal breath sounds. Abdominal:      General: Abdomen is flat. Palpations: Abdomen is soft. Musculoskeletal:         General: Normal range of motion. Cervical back: Normal range of motion and neck supple. Skin:     General: Skin is warm and dry. Capillary Refill: Capillary refill takes less than 2 seconds. Neurological:      General: No focal deficit present. Mental Status: She is alert. Mental status is at baseline. Psychiatric:         Mood and Affect: Mood normal.         DIFFERENTIAL  DIAGNOSIS     DDX: Toenail avulsion    PLAN (LABS / IMAGING / EKG):  No orders of the defined types were placed in this encounter. MEDICATIONS ORDERED:  No orders of the defined types were placed in this encounter. DIAGNOSTIC RESULTS / EMERGENCY DEPARTMENT COURSE / MDM     LABS:  No results found for this visit on 01/01/22. IMPRESSION/MDM/ED COURSE:  35 y.o. female presented with near complete toenail avulsion of the right great toe. Physical evaluation shows the nail is pulled back all the way to the germinal matrix. Discussion with the patient about treatment options, patient opted to have her toenail clipped at home or put a Band-Aid over it to allow the nail to grow under it with the knowledge of the toenail would fall off on its own. We will proceed in such fashion. Patient/Guardian requesting discharge. Patient/Guardian was given written and verbal instructions prior to discharge. Patient/Guardian understood and agreed. Patient/Guardian had no further questions.        RADIOLOGY:  No orders to display EKG  None    All EKG's are interpreted by the Emergency Department Physician who either signs or Co-signs this chart in the absence of a cardiologist.      PROCEDURES:  None    CONSULTS:  None        FINAL IMPRESSION      1. Nail avulsion of toe, initial encounter          DISPOSITION / PLAN       DISPOSITION          PATIENT REFERREDTO:  No follow-up provider specified.     DISCHARGE MEDICATIONS:  New Prescriptions    No medications on file       Karen Sanabria MD  PGY 2  Resident Physician Emergency Medicine  01/01/22 8:50 AM        (Please note that portions of this note were completed with a voice recognition program.Efforts were made to edit the dictations but occasionally words are mis-transcribed.)       Karen Sanabria MD  Resident  01/01/22 [id number]

## 2022-01-01 NOTE — ED PROVIDER NOTES
[facility] ED     Emergency Department     Faculty Attestation    I performed a history and physical examination of the patient and discussed management with the resident. I reviewed the residents note and agree with the documented findings and plan of care. Any areas of disagreement are noted on the chart. I was personally present for the key portions of any procedures. I have documented in the chart those procedures where I was not present during the key portions. I have reviewed the emergency nurses triage note. I agree with the chief complaint, past medical history, past surgical history, allergies, medications, social and family history as documented unless otherwise noted below. For Physician Assistant/ Nurse Practitioner cases/documentation I have personally evaluated this patient and have completed at least one if not all key elements of the E/M (history, physical exam, and MDM). Additional findings are as noted. Patient presents with great right toe pain and injury. Patient says she was drinking last night and does not know how exactly she injured it. She woke up with the pain and discoloration of the nail. She has no other complaints. We will get an x-ray and treat patient's pain.       Wilian Ibrahim MD  Attending Emergency  Physician              Marisabel Holt MD  01/01/22 2445

## 2022-03-13 ENCOUNTER — APPOINTMENT (OUTPATIENT)
Dept: GENERAL RADIOLOGY | Age: 42
End: 2022-03-13
Payer: COMMERCIAL

## 2022-03-13 ENCOUNTER — HOSPITAL ENCOUNTER (EMERGENCY)
Age: 42
Discharge: HOME | End: 2022-03-13
Attending: EMERGENCY MEDICINE
Payer: COMMERCIAL

## 2022-03-13 VITALS
WEIGHT: 273 LBS | HEART RATE: 80 BPM | DIASTOLIC BLOOD PRESSURE: 81 MMHG | RESPIRATION RATE: 16 BRPM | OXYGEN SATURATION: 95 % | TEMPERATURE: 98.2 F | HEIGHT: 62 IN | SYSTOLIC BLOOD PRESSURE: 129 MMHG | BODY MASS INDEX: 50.24 KG/M2

## 2022-03-13 DIAGNOSIS — J06.9 UPPER RESPIRATORY TRACT INFECTION, UNSPECIFIED TYPE: Primary | ICD-10-CM

## 2022-03-13 DIAGNOSIS — R04.2 HEMOPTYSIS: ICD-10-CM

## 2022-03-13 PROCEDURE — 99282 EMERGENCY DEPT VISIT SF MDM: CPT

## 2022-03-13 PROCEDURE — 71045 X-RAY EXAM CHEST 1 VIEW: CPT

## 2022-03-13 RX ORDER — SULFAMETHOXAZOLE AND TRIMETHOPRIM 800; 160 MG/1; MG/1
1 TABLET ORAL 2 TIMES DAILY
Qty: 20 TABLET | Refills: 0 | Status: SHIPPED | OUTPATIENT
Start: 2022-03-13 | End: 2022-03-23

## 2022-03-13 RX ORDER — BENZONATATE 100 MG/1
100 CAPSULE ORAL EVERY 8 HOURS PRN
Qty: 20 CAPSULE | Refills: 0 | Status: SHIPPED | OUTPATIENT
Start: 2022-03-13

## 2022-03-13 ASSESSMENT — ENCOUNTER SYMPTOMS
VOMITING: 0
DIARRHEA: 0
CONSTIPATION: 0
COLOR CHANGE: 0
EYE DISCHARGE: 0
EYE REDNESS: 0
SHORTNESS OF BREATH: 0
ABDOMINAL PAIN: 0
COUGH: 1
FACIAL SWELLING: 0

## 2022-03-13 NOTE — ED PROVIDER NOTES
[address] [place] [address] ED  EMERGENCY DEPARTMENT ENCOUNTER      Pt Name: Sola Metzger  MRN: [id number]  [place] 1980  Date of evaluation: 3/13/2022  Provider: Denae Hernandez MD    CHIEF COMPLAINT       Chief Complaint   Patient presents with    Other     blood in saliva          HISTORY OF PRESENT ILLNESS  (Location/Symptom, Timing/Onset, Context/Setting, Quality, Duration, Modifying Factors, Severity.)   Sola Metzger is a 35 y.o. female who presents to the emergency department for cough. She began coughing up some yellow phlegm today and there was some blood in it. She does not complain of chest pain or fever. No vomiting or diarrhea and she is a non-smoker. She does not complain to me of pain. Nursing Notes were reviewed. ALLERGIES     Latex, Ampicillin, Bee venom, Codeine, Morphine, and Adhesive tape    CURRENT MEDICATIONS       Previous Medications    ACETAMINOPHEN (APAP EXTRA STRENGTH) 500 MG TABLET    Take 2 tablets by mouth every 6 hours as needed for Pain    ALBUTEROL (PROVENTIL) (2.5 MG/3ML) 0.083% NEBULIZER SOLUTION    Take 3 mLs by nebulization every 6 hours as needed for Wheezing    ALBUTEROL SULFATE  (90 BASE) MCG/ACT INHALER    Inhale 2 puffs into the lungs every 6 hours as needed for Wheezing or Shortness of Breath Indications: Wheezing    BENZOCAINE-MENTHOL (CEPACOL) 6-10 MG LOZG LOZENGE    Take 1 lozenge by mouth every 2 hours as needed for Sore Throat    CITALOPRAM (CELEXA) 40 MG TABLET    Take 40 mg by mouth every morning. IBUPROFEN (ADVIL;MOTRIN) 600 MG TABLET    Take 1 tablet by mouth every 6 hours as needed for Pain    ONDANSETRON (ZOFRAN) 4 MG/2ML INJECTION    Infuse 2 mLs intravenously once as needed for Nausea for 1 dose. TRAZODONE (DESYREL) 50 MG TABLET    Take 50 mg by mouth nightly.        PAST MEDICAL HISTORY         Diagnosis Date    Asthma     history    Bipolar affect, depressed ([address]     ON MEDS    Environmental allergies     GERD (gastroesophageal reflux disease)     Lumbar pain     PVD (peripheral vascular disease) [address]        SURGICAL HISTORY           Procedure Laterality Date    DENTAL SURGERY  07/17/2013    extraction   x  2  teeth    DILATION AND CURETTAGE OF UTERUS  2010    WISDOM TOOTH EXTRACTION           FAMILY HISTORY     No family history on file. No family status information on file. SOCIAL HISTORY      reports that she has never smoked. She has never used smokeless tobacco. She reports that she does not drink alcohol and does not use drugs. REVIEW OF SYSTEMS    (2-9 systems for level 4, 10 or more for level 5)     Review of Systems   Constitutional: Negative for chills, fatigue and fever. HENT: Negative for congestion, ear discharge and facial swelling. Eyes: Negative for discharge and redness. Respiratory: Positive for cough. Negative for shortness of breath. Cardiovascular: Negative for chest pain. Gastrointestinal: Negative for abdominal pain, constipation, diarrhea and vomiting. Genitourinary: Negative for dysuria and hematuria. Musculoskeletal: Negative for arthralgias. Skin: Negative for color change and rash. Neurological: Negative for syncope, numbness and headaches. Hematological: Negative for adenopathy. Psychiatric/Behavioral: Negative for confusion. The patient is not nervous/anxious. Except as noted above the remainder of the review of systems was reviewed and negative. PHYSICAL EXAM    (up to 7 for level 4, 8 or more for level 5)     Vitals:    03/13/22 1415   BP: 129/81   Pulse: 80   Resp: 16   Temp: 98.2 °F (36.8 °C)   SpO2: 95%   Weight: 273 lb (123.8 kg)   Height: 5' 2\" (1.575 m)       Physical Exam  Vitals reviewed. Constitutional:       General: She is not in acute distress. Appearance: She is well-developed. She is not diaphoretic. HENT:      Head: Normocephalic and atraumatic. Eyes:      General: No scleral icterus. Right eye: No discharge.          Left eye: No discharge. Cardiovascular:      Rate and Rhythm: Normal rate and regular rhythm. Pulmonary:      Effort: Pulmonary effort is normal. No respiratory distress. Breath sounds: Normal breath sounds. No stridor. No wheezing or rales. Abdominal:      General: There is no distension. Palpations: Abdomen is soft. Tenderness: There is no abdominal tenderness. Musculoskeletal:         General: Normal range of motion. Cervical back: Neck supple. Lymphadenopathy:      Cervical: No cervical adenopathy. Skin:     General: Skin is warm and dry. Findings: No erythema or rash. Neurological:      Mental Status: She is alert. Comments: Awake and alert   Psychiatric:         Behavior: Behavior normal.             DIAGNOSTIC RESULTS     EKG: All EKG's are interpreted by the Emergency Department Physician who either signs or Co-signs this chart in the absence of a cardiologist.    Not indicated    RADIOLOGY:   Non-plain film images such as CT, Ultrasound and MRI are read by the radiologist. Plain radiographic images are visualized and preliminarily interpreted by the emergency physician with the below findings:    Interpretation per the Radiologist below, if available at the time of this note:    XR CHEST PORTABLE    Result Date: 3/13/2022  EXAMINATION: ONE XRAY VIEW OF THE CHEST 3/13/2022 2:38 pm COMPARISON: None. HISTORY: ORDERING SYSTEM PROVIDED HISTORY: Cough, hemoptysis TECHNOLOGIST PROVIDED HISTORY: Cough, hemoptysis Reason for Exam: cough FINDINGS: A portable upright frontal view chest radiograph was obtained. The heart is enlarged. The mediastinal contour and pleural spaces are otherwise within normal limits. The lungs are grossly clear. There is no focal consolidation or pneumothorax. The pulmonary vascular pattern is within normal limits. No acute thoracic osseous abnormality. Clear lungs. Cardiomegaly. No acute cardiopulmonary abnormality.        LABS:  Labs Reviewed - No data to display    All other labs were within normal range or not returned as of this dictation. EMERGENCY DEPARTMENT COURSE and DIFFERENTIAL DIAGNOSIS/MDM:   Vitals:    Vitals:    03/13/22 1415   BP: 129/81   Pulse: 80   Resp: 16   Temp: 98.2 °F (36.8 °C)   SpO2: 95%   Weight: 273 lb (123.8 kg)   Height: 5' 2\" (1.575 m)       Orders Placed This Encounter   Medications    benzonatate (TESSALON PERLES) 100 MG capsule     Sig: Take 1 capsule by mouth every 8 hours as needed for Cough     Dispense:  20 capsule     Refill:  0    sulfamethoxazole-trimethoprim (BACTRIM DS) 800-160 MG per tablet     Sig: Take 1 tablet by mouth 2 times daily for 10 days     Dispense:  20 tablet     Refill:  0       Medical Decision Making: X-rays negative and she will be prescribed Bactrim and Tessalon. Treatment diagnosis and follow-up were discussed with the patient. CONSULTS:  None    PROCEDURES:  None    FINAL IMPRESSION      1. Upper respiratory tract infection, unspecified type    2. Hemoptysis          DISPOSITION/PLAN   DISPOSITION Decision To Discharge 03/13/2022 04:11:43 PM      PATIENT REFERRED TO:   Jamie Ignacio  [address] [id number] [phone]      As needed    [facility] ED  [address] [facility]  [phone]    If symptoms worsen      DISCHARGE MEDICATIONS:     New Prescriptions    BENZONATATE (TESSALON PERLES) 100 MG CAPSULE    Take 1 capsule by mouth every 8 hours as needed for Cough    SULFAMETHOXAZOLE-TRIMETHOPRIM (BACTRIM DS) 800-160 MG PER TABLET    Take 1 tablet by mouth 2 times daily for 10 days       The care is provided during an unprecedented national emergency due to the novel coronavirus, COVID-19.     (Please note that portions of this note were completed with a voice recognition program.  Efforts were made to edit the dictations but occasionally words are mis-transcribed.)    Liam Juarez MD  Attending Emergency Physician            Liam Juarez, MD  03/13/22 8148

## 2022-05-13 ENCOUNTER — HOSPITAL ENCOUNTER (EMERGENCY)
Age: 42
Discharge: HOME | End: 2022-05-13
Attending: EMERGENCY MEDICINE
Payer: COMMERCIAL

## 2022-05-13 VITALS
RESPIRATION RATE: 18 BRPM | SYSTOLIC BLOOD PRESSURE: 123 MMHG | TEMPERATURE: 98.6 F | HEART RATE: 84 BPM | DIASTOLIC BLOOD PRESSURE: 84 MMHG | OXYGEN SATURATION: 95 %

## 2022-05-13 DIAGNOSIS — K64.8 INTERNAL HEMORRHOIDS: Primary | ICD-10-CM

## 2022-05-13 PROCEDURE — 99283 EMERGENCY DEPT VISIT LOW MDM: CPT

## 2022-05-13 RX ORDER — PRAMOXINE HYDROCHLORIDE 10 MG/G
AEROSOL, FOAM TOPICAL EVERY 4 HOURS PRN
Qty: 15 G | Refills: 0 | Status: SHIPPED | OUTPATIENT
Start: 2022-05-13 | End: 2022-05-18

## 2022-05-13 ASSESSMENT — ENCOUNTER SYMPTOMS
EYE REDNESS: 0
EYE PAIN: 0
COLOR CHANGE: 0
NAUSEA: 0
SORE THROAT: 0
PHOTOPHOBIA: 0
VOMITING: 0
CONSTIPATION: 0
COUGH: 0
SINUS PRESSURE: 0
BLOOD IN STOOL: 1
RHINORRHEA: 0
CHEST TIGHTNESS: 0
SHORTNESS OF BREATH: 0
DIARRHEA: 0
ABDOMINAL PAIN: 0

## 2022-05-13 ASSESSMENT — PAIN - FUNCTIONAL ASSESSMENT: PAIN_FUNCTIONAL_ASSESSMENT: NONE - DENIES PAIN

## 2022-05-14 NOTE — ED PROVIDER NOTES
[facility] ED  eMERGENCY dEPARTMENT eNCOUnter   Attending Attestation     Pt Name: Charbel Bruce  MRN: [id number]  [place] 1980  Date of evaluation: 5/13/22       Charbel Bruce is a 35 y.o. female who presents with Rectal Bleeding (streaks of blood when wiping. States has hx of hemmhroids )      History: Patient presents with streaks of blood on the toilet paper when wiping after a bowel movement. Patient has no other complaints right now. Exam: Heart rate and rhythm are regular. Lungs are clear to auscultation bilaterally. Abdomen is soft, nontender. Plan for follow-up to [facility] clinic, will discharge. Will give symptomatic treatment for hemorrhoid. I performed a history and physical examination of the patient and discussed management with the resident. I reviewed the residents note and agree with the documented findings and plan of care. Any areas of disagreement are noted on the chart. I was personally present for the key portions of any procedures. I have documented in the chart those procedures where I was not present during the key portions. I have personally reviewed all images and agree with the resident's interpretation. I have reviewed the emergency nurses triage note. I agree with the chief complaint, past medical history, past surgical history, allergies, medications, social and family history as documented unless otherwise noted below. Documentation of the HPI, Physical Exam and Medical Decision Making performed by medical students or scribes is based on my personal performance of the HPI, PE and MDM. For Phys Assistant/ Nurse Practitioner cases/documentation I have had a face to face evaluation of this patient and have completed at least one if not all key elements of the E/M (history, physical exam, and MDM). Additional findings are as noted.     For APC cases I have personally evaluated and examined the patient in conjunction with the APC and agree with the treatment plan and disposition of the patient as recorded by the APC.     Eugenie Corrigan MD  Attending Emergency  Physician       Kath Greco MD  05/13/22 4119

## 2022-05-14 NOTE — ED PROVIDER NOTES
[facility] ED  Emergency Department Encounter  EmergencyMedicine Resident     Pt mAairani Estrada  MRN: [id number]  [place] 1980  Date of evaluation: 5/13/22  PCP:  Keila Segura    This patient was evaluated in the Emergency Department for symptoms described in the history of present illness. The patient was evaluated in the context of the global COVID-19 pandemic, which necessitated consideration that the patient might be at risk for infection with the SARS-CoV-2 virus that causes COVID-19. Institutional protocols and algorithms that pertain to the evaluation of patients at risk for COVID-19 are in a state of rapid change based on information released by regulatory bodies including the CDC and federal and state organizations. These policies and algorithms were followed during the patient's care in the ED. CHIEF COMPLAINT       Chief Complaint   Patient presents with    Rectal Bleeding     streaks of blood when wiping. States has hx of hemmhroids        HISTORY OF PRESENT ILLNESS  (Location/Symptom, Timing/Onset, Context/Setting, Quality, Duration, Modifying Factors, Severity.)      Arthur Delgado is a 35 y.o. female who presents with concerns from bleeding from the rectum. Patient states this been ongoing for several months. States she notes bright red streaks when wiping. Denies any pain at the rectal site. She has a history of hemorrhoids. No history of Crohn's ulcerative colitis. States she is not constipated lately and strains when she goes the bathroom. Rectal exam negative for external hemorrhoids, Hemoccult negative. No pain on exam, no signs of injury, no lacerations or lesions, no rashes. PAST MEDICAL / SURGICAL / SOCIAL / FAMILY HISTORY      has a past medical history of Asthma, Bipolar affect, depressed ([address] Environmental allergies, GERD (gastroesophageal reflux disease), Lumbar pain, and PVD (peripheral vascular disease) ([address]        has a past surgical history that includes Dilation and curettage of uterus (2010); [place] tooth extraction; and Dental surgery (07/17/2013). Social History     Socioeconomic History    Marital status: Single     Spouse name: Not on file    Number of children: Not on file    Years of education: Not on file    Highest education level: Not on file   Occupational History    Not on file   Tobacco Use    Smoking status: Never Smoker    Smokeless tobacco: Never Used   Vaping Use    Vaping Use: Never used   Substance and Sexual Activity    Alcohol use: No    Drug use: No    Sexual activity: Not on file   Other Topics Concern    Not on file   Social History Narrative    Not on file     Social Determinants of Health     Financial Resource Strain:     Difficulty of Paying Living Expenses: Not on file   Food Insecurity:     Worried About Running Out of Food in the Last Year: Not on file    Mike of Food in the Last Year: Not on file   Transportation Needs:     Lack of Transportation (Medical): Not on file    Lack of Transportation (Non-Medical):  Not on file   Physical Activity:     Days of Exercise per Week: Not on file    Minutes of Exercise per Session: Not on file   Stress:     Feeling of Stress : Not on file   Social Connections:     Frequency of Communication with Friends and Family: Not on file    Frequency of Social Gatherings with Friends and Family: Not on file    Attends Orthodoxy Services: Not on file    Active Member of [address] [place] [address] or Organizations: Not on file    Attends Club or Organization Meetings: Not on file    Marital Status: Not on file   Intimate Partner Violence:     Fear of Current or Ex-Partner: Not on file    Emotionally Abused: Not on file    Physically Abused: Not on file    Sexually Abused: Not on file   Housing Stability:     Unable to Pay for Housing in the Last Year: Not on file    Number of [place] in the Last Year: Not on file    Unstable Housing in the Last Year: Not on file History reviewed. No pertinent family history. Allergies:  Latex, Ampicillin, Bee venom, Codeine, Morphine, and Adhesive tape    Home Medications:  Prior to Admission medications    Medication Sig Start Date End Date Taking? Authorizing Provider   pramoxine HCl (PROCTOFOAM) 1 % foam Place around the anus every 4 hours as needed for Hemorrhoids 5/13/22 5/18/22 Finesse Carmona MD   benzonatate (TESSALON PERLES) 100 MG capsule Take 1 capsule by mouth every 8 hours as needed for Cough 3/13/22   Eleazar Sheets MD   acetaminophen (APAP EXTRA STRENGTH) 500 MG tablet Take 2 tablets by mouth every 6 hours as needed for Pain 10/24/21   Olivia Myeer, DO   ibuprofen (ADVIL;MOTRIN) 600 MG tablet Take 1 tablet by mouth every 6 hours as needed for Pain 10/24/21   Olivia Meyer, DO   Benzocaine-Menthol (CEPACOL) 6-10 MG LOZG lozenge Take 1 lozenge by mouth every 2 hours as needed for Sore Throat 10/24/21   Olivia Meyer, DO   albuterol sulfate  (90 Base) MCG/ACT inhaler Inhale 2 puffs into the lungs every 6 hours as needed for Wheezing or Shortness of Breath Indications: Wheezing 5/21/21   Mary Oneill MD   albuterol (PROVENTIL) (2.5 MG/3ML) 0.083% nebulizer solution Take 3 mLs by nebulization every 6 hours as needed for Wheezing 5/21/21   Mary Oneill MD   ondansetron [facility]) 4 MG/2ML injection Infuse 2 mLs intravenously once as needed for Nausea for 1 dose. 7/17/13   Tam Chacon, ANJEL   citalopram (CELEXA) 40 MG tablet Take 40 mg by mouth every morning. Historical Provider, MD   traZODone (DESYREL) 50 MG tablet Take 50 mg by mouth nightly. Historical Provider, MD       REVIEW OF SYSTEMS    (2-9 systems for level 4, 10 or more for level 5)      Review of Systems   Constitutional: Negative for activity change, chills, diaphoresis, fatigue and fever. HENT: Negative for congestion, rhinorrhea, sinus pressure and sore throat. Eyes: Negative for photophobia, pain and redness. Respiratory: Negative for cough, chest tightness and shortness of breath. Cardiovascular: Negative for chest pain and leg swelling. Gastrointestinal: Positive for blood in stool. Negative for abdominal pain, constipation, diarrhea, nausea and vomiting. Genitourinary: Negative for decreased urine volume, dysuria, flank pain, frequency, genital sores, urgency and vaginal bleeding. Musculoskeletal: Negative for arthralgias, myalgias and neck stiffness. Skin: Negative for color change, pallor and rash. Neurological: Negative for dizziness, syncope, weakness, light-headedness, numbness and headaches. PHYSICAL EXAM   (up to 7 for level 4, 8 or more for level 5)      INITIAL VITALS:   /84   Pulse 84   Temp 98.6 °F (37 °C)   Resp 18   SpO2 95%     Physical Exam  Constitutional:  Well developed, Well nourished. HENT:  Normocephalic, Atraumatic, Bilateral external ears normal,  Nose normal.   Neck: Normal range of motion, No stridor. Eyes:   No discharge. Respiratory:   No respiratory distress, Normal breath sounds without any wheezing, rales or rhonchi. Cardiovascular: Normal S1, S2. No rubs, gallops or murmurs. Gastrointestinal:  No organomegaly, no tenderness, no rebound or guarding. Musculoskeletal:  No extremity deformity. Lymphatic: No lymphadenopathy noted  Skin:  Warm, Dry,  No rash. Neurologic:  Alert & oriented x 3, Normal motor function,  No focal deficits noted. Psychiatric:  Affect normal, Judgment normal, Mood normal.              DIFFERENTIAL  DIAGNOSIS     PLAN (LABS / IMAGING / EKG):  No orders of the defined types were placed in this encounter.       MEDICATIONS ORDERED:  Orders Placed This Encounter   Medications    pramoxine HCl (PROCTOFOAM) 1 % foam     Sig: Place around the anus every 4 hours as needed for Hemorrhoids     Dispense:  15 g     Refill:  0       DDX: Anal fissure, internal versus external hemorrhoids, diverticulitis    DIAGNOSTIC RESULTS / EMERGENCY DEPARTMENT COURSE / MDM   LAB RESULTS:  No results found for this visit on 05/13/22. RADIOLOGY:  No results found. IMPRESSION: 27-year-old female with concerns for rectal bleeding. Patient has a history of hemorrhoids. Claims that she has streaks of bright red blood upon wiping, did not notice any blood in the toilet. Is otherwise well with no signs of pallor, with normal vital signs. Will perform rectal exam with Hemoccult test.  Anticipate discharge home with Proctofoam prescription. And PCP follow-up      EMERGENCY DEPARTMENT COURSE:  ED Course as of 05/13/22 2159   Fri May 13, 2022   2159 Rectal exam negative for any signs of external hemorrhoids, Hemoccult negative, no palpable internal hemorrhoids. [QC]      ED Course User Index  [QC] Dejuan Brunson MD               PROCEDURES:  Rectal exam    CONSULTS:  None        FINAL IMPRESSION      1.  Internal hemorrhoids          DISPOSITION / PLAN     DISPOSITION Decision To Discharge 05/13/2022 09:37:14 PM      PATIENT REFERRED TO:  [address] [facility]  [phone]    Schedule an appointment as soon as possible for a visit       [facility] ED  [address] [facility] [id number] [phone]    If symptoms worsen      DISCHARGE MEDICATIONS:  Discharge Medication List as of 5/13/2022  9:42 PM      START taking these medications    Details   pramoxine HCl (PROCTOFOAM) 1 % foam Place around the anus every 4 hours as needed for Hemorrhoids, PeriANAL, EVERY 4 HOURS PRN Starting Fri 5/13/2022, Until Wed 5/18/2022 at 2359, For 5 days, Disp-15 g, R-0, Print             Dejuan Brunson MD  Emergency Medicine Resident    (Please note that portions of thisnote were completed with a voice recognition program.  Efforts were made to edit the dictations but occasionally words are mis-transcribed.)         Dejuan Brunson MD  Resident  05/13/22 2159

## 2022-05-14 NOTE — ED TRIAGE NOTES
Writer is extended triage nurse. Assessment and treatment for this patient was primarily performed by resident and attending with extended triage nurse performing tasks as directed. Pt seen primarily by resident and attending physicians. RN assessment deferred to physician assessment.

## 2022-05-18 ENCOUNTER — HOSPITAL ENCOUNTER (EMERGENCY)
Age: 42
Discharge: HOME | End: 2022-05-18
Attending: EMERGENCY MEDICINE
Payer: COMMERCIAL

## 2022-05-18 ENCOUNTER — APPOINTMENT (OUTPATIENT)
Dept: GENERAL RADIOLOGY | Age: 42
End: 2022-05-18
Payer: COMMERCIAL

## 2022-05-18 VITALS
SYSTOLIC BLOOD PRESSURE: 129 MMHG | TEMPERATURE: 97.8 F | RESPIRATION RATE: 17 BRPM | BODY MASS INDEX: 50.61 KG/M2 | OXYGEN SATURATION: 97 % | HEART RATE: 91 BPM | DIASTOLIC BLOOD PRESSURE: 88 MMHG | HEIGHT: 62 IN | WEIGHT: 275 LBS

## 2022-05-18 DIAGNOSIS — J18.9 PNEUMONIA OF RIGHT MIDDLE LOBE DUE TO INFECTIOUS ORGANISM: Primary | ICD-10-CM

## 2022-05-18 LAB
ABSOLUTE EOS #: 0.23 K/UL (ref 0–0.44)
ABSOLUTE IMMATURE GRANULOCYTE: 0.03 K/UL (ref 0–0.3)
ABSOLUTE LYMPH #: 1.49 K/UL (ref 1.1–3.7)
ABSOLUTE MONO #: 1.1 K/UL (ref 0.1–1.2)
ANION GAP SERPL CALCULATED.3IONS-SCNC: 14 MMOL/L (ref 9–17)
BASOPHILS # BLD: 1 % (ref 0–2)
BASOPHILS ABSOLUTE: 0.08 K/UL (ref 0–0.2)
BUN BLDV-MCNC: 15 MG/DL (ref 6–20)
CALCIUM SERPL-MCNC: 9 MG/DL (ref 8.6–10.4)
CHLORIDE BLD-SCNC: 105 MMOL/L (ref 98–107)
CO2: 21 MMOL/L (ref 20–31)
CREAT SERPL-MCNC: 0.59 MG/DL (ref 0.5–0.9)
EOSINOPHILS RELATIVE PERCENT: 2 % (ref 1–4)
GFR AFRICAN AMERICAN: >60 ML/MIN
GFR NON-AFRICAN AMERICAN: >60 ML/MIN
GFR SERPL CREATININE-BSD FRML MDRD: ABNORMAL ML/MIN/{1.73_M2}
GLUCOSE BLD-MCNC: 98 MG/DL (ref 70–99)
HCT VFR BLD CALC: 41 % (ref 36.3–47.1)
HEMOGLOBIN: 12.8 G/DL (ref 11.9–15.1)
IMMATURE GRANULOCYTES: 0 %
LYMPHOCYTES # BLD: 16 % (ref 24–43)
MAGNESIUM: 1.8 MG/DL (ref 1.6–2.6)
MCH RBC QN AUTO: 28.7 PG (ref 25.2–33.5)
MCHC RBC AUTO-ENTMCNC: 31.2 G/DL (ref 28.4–34.8)
MCV RBC AUTO: 91.9 FL (ref 82.6–102.9)
MONOCYTES # BLD: 12 % (ref 3–12)
NRBC AUTOMATED: 0 PER 100 WBC
PDW BLD-RTO: 13.3 % (ref 11.8–14.4)
PLATELET # BLD: 408 K/UL (ref 138–453)
PMV BLD AUTO: 10.6 FL (ref 8.1–13.5)
POTASSIUM SERPL-SCNC: 3.6 MMOL/L (ref 3.7–5.3)
RBC # BLD: 4.46 M/UL (ref 3.95–5.11)
SEG NEUTROPHILS: 69 % (ref 36–65)
SEGMENTED NEUTROPHILS ABSOLUTE COUNT: 6.6 K/UL (ref 1.5–8.1)
SODIUM BLD-SCNC: 140 MMOL/L (ref 135–144)
TROPONIN, HIGH SENSITIVITY: <6 NG/L (ref 0–14)
WBC # BLD: 9.5 K/UL (ref 3.5–11.3)

## 2022-05-18 PROCEDURE — 85025 COMPLETE CBC W/AUTO DIFF WBC: CPT

## 2022-05-18 PROCEDURE — 71045 X-RAY EXAM CHEST 1 VIEW: CPT

## 2022-05-18 PROCEDURE — 99285 EMERGENCY DEPT VISIT HI MDM: CPT

## 2022-05-18 PROCEDURE — 84484 ASSAY OF TROPONIN QUANT: CPT

## 2022-05-18 PROCEDURE — 80048 BASIC METABOLIC PNL TOTAL CA: CPT

## 2022-05-18 PROCEDURE — 93005 ELECTROCARDIOGRAM TRACING: CPT

## 2022-05-18 PROCEDURE — 6370000000 HC RX 637 (ALT 250 FOR IP): Performed by: STUDENT IN AN ORGANIZED HEALTH CARE EDUCATION/TRAINING PROGRAM

## 2022-05-18 PROCEDURE — 83735 ASSAY OF MAGNESIUM: CPT

## 2022-05-18 RX ORDER — ASPIRIN 81 MG/1
324 TABLET, CHEWABLE ORAL ONCE
Status: DISCONTINUED | OUTPATIENT
Start: 2022-05-18 | End: 2022-05-18 | Stop reason: HOSPADM

## 2022-05-18 RX ORDER — AZITHROMYCIN 250 MG/1
250 TABLET, FILM COATED ORAL DAILY
Qty: 4 TABLET | Refills: 0 | Status: SHIPPED | OUTPATIENT
Start: 2022-05-18 | End: 2022-05-22

## 2022-05-18 RX ORDER — AZITHROMYCIN 250 MG/1
500 TABLET, FILM COATED ORAL ONCE
Status: COMPLETED | OUTPATIENT
Start: 2022-05-18 | End: 2022-05-18

## 2022-05-18 RX ADMIN — AZITHROMYCIN 500 MG: 250 TABLET, FILM COATED ORAL at 20:46

## 2022-05-18 ASSESSMENT — PAIN - FUNCTIONAL ASSESSMENT: PAIN_FUNCTIONAL_ASSESSMENT: NONE - DENIES PAIN

## 2022-05-18 NOTE — ED PROVIDER NOTES
[facility] ED  Emergency Department Encounter  Emergency Medicine Resident     Pt Name: Annetta Main  MRN: [id number]  [place] 1980  Date of evaluation: 5/18/22  PCP:  Eric Godfrey    CHIEF COMPLAINT       Chief Complaint   Patient presents with    Chest Congestion     sob on exertion, hx of asthma, yellow sputum with cough    Shortness of Breath       HISTORY OFPRESENT ILLNESS  (Location/Symptom, Timing/Onset, Context/Setting, Quality, Duration, Modifying Lynita Hush.)      Annetta Main is a 35 y.o. female with past medical history significant for asthma with ports of cough, congestion, \"lots of mucus\" for the past 2 days. Denies any fevers or chills at home. Denies any chest pain although does note she has been having some shortness of breath. Denies any hemoptysis. Does have a productive cough of yellow-green sputum. Patient has asthma at baseline and has been using her inhaler with mildly increased frequency. Patient denies any abdominal pain, nausea, vomiting denies any pain with urination. Denies any lower leg swelling. Denies any unilateral swelling. Denies any redness of the leg. Denies any history of PEs or DVTs. Patient is vaccinated against COVID and influenza. PAST MEDICAL / SURGICAL / SOCIAL / FAMILY HISTORY      has a past medical history of Asthma, Bipolar affect, depressed ([address] Environmental allergies, GERD (gastroesophageal reflux disease), Lumbar pain, and PVD (peripheral vascular disease) ([facility] [address] has a past surgical history that includes Dilation and curettage of uterus (2010); [place] tooth extraction; and Dental surgery (07/17/2013). Social:  reports that she has never smoked. She has never used smokeless tobacco. She reports that she does not drink alcohol and does not use drugs. Family Hx: History reviewed. No pertinent family history.      Allergies:  Latex, Ampicillin, Bee venom, Codeine, Morphine, Pcn [penicillins], and Adhesive tape    Home Medications:  Prior to Admission medications    Medication Sig Start Date End Date Taking? Authorizing Provider   Escitalopram Oxalate (LEXAPRO PO) Take by mouth   Yes Historical Provider, MD   pramoxine HCl (PROCTOFOAM) 1 % foam Place around the anus every 4 hours as needed for Hemorrhoids 5/13/22 5/18/22  Hilda Caballero MD   benzonatate (TESSALON PERLES) 100 MG capsule Take 1 capsule by mouth every 8 hours as needed for Cough 3/13/22   Dionne Stevens MD   acetaminophen (APAP EXTRA STRENGTH) 500 MG tablet Take 2 tablets by mouth every 6 hours as needed for Pain 10/24/21   Jaya Pole, DO   ibuprofen (ADVIL;MOTRIN) 600 MG tablet Take 1 tablet by mouth every 6 hours as needed for Pain 10/24/21   Jaya Pole, DO   Benzocaine-Menthol (CEPACOL) 6-10 MG LOZG lozenge Take 1 lozenge by mouth every 2 hours as needed for Sore Throat 10/24/21   Jaya Pole, DO   albuterol sulfate  (90 Base) MCG/ACT inhaler Inhale 2 puffs into the lungs every 6 hours as needed for Wheezing or Shortness of Breath Indications: Wheezing 5/21/21   Zohra Lombardo MD   albuterol (PROVENTIL) (2.5 MG/3ML) 0.083% nebulizer solution Take 3 mLs by nebulization every 6 hours as needed for Wheezing 5/21/21   Zohra Lombardo MD   ondansetron [facility]) 4 MG/2ML injection Infuse 2 mLs intravenously once as needed for Nausea for 1 dose. 7/17/13   Teri Rainey DDS   citalopram (CELEXA) 40 MG tablet Take 40 mg by mouth every morning. Historical Provider, MD   traZODone (DESYREL) 50 MG tablet Take 50 mg by mouth nightly.     Historical Provider, MD       REVIEW OFSYSTEMS    (2-9 systems for level 4, 10 or more for level 5)      Positive: Cough, congestion, productive cough, yellow-green sputum, shortness of breath    Negative: Fevers, chills, chest pain, Tab pain, nausea, vomiting, eye pain, ear pain, headache    PHYSICAL EXAM   (up to 7 for level 4, 8 or more forlevel 5)      INITIAL VITALS:   Vitals: 05/18/22 1908   BP: 129/88   Pulse: 91   Resp: 17   Temp: 97.8 °F (36.6 °C)   SpO2: 97%        Physical Exam  Vitals and nursing note reviewed. Constitutional:       General: She is not in acute distress. Appearance: Normal appearance. She is not ill-appearing, toxic-appearing or diaphoretic. Comments: Alert, oriented, answering questions appropriately, no acute distress, no respiratory distress. HENT:      Head: Normocephalic and atraumatic. Nose: Nose normal.      Mouth/Throat:      Mouth: Mucous membranes are moist.      Pharynx: Oropharynx is clear. Eyes:      General:         Right eye: No discharge. Left eye: No discharge. Pupils: Pupils are equal, round, and reactive to light. Cardiovascular:      Rate and Rhythm: Normal rate and regular rhythm. Pulmonary:      Effort: Pulmonary effort is normal. No respiratory distress. Breath sounds: Wheezing (scattered throughout ) and rhonchi (right lower ) present. No decreased breath sounds or rales. Chest:      Chest wall: No mass. Abdominal:      General: There is no distension. Palpations: Abdomen is soft. Tenderness: There is no abdominal tenderness. Musculoskeletal:      Right lower leg: No edema. Left lower leg: No edema. Comments: No Tenderness to  palpation of bilateral calves. Negative elsi's, no erythema or unilateral leg swelling noted. Skin:     General: Skin is warm and dry. Capillary Refill: Capillary refill takes less than 2 seconds. Neurological:      General: No focal deficit present. Mental Status: She is alert and oriented to person, place, and time. Psychiatric:         Mood and Affect: Mood normal.         Thought Content: Thought content normal.         DIFFERENTIAL  DIAGNOSIS       Initial MDM/Plan: 35 y.o. female who presents with reports of cough, congestion, productive cough, shortness of breath.  Upon my initial examination patient is resting comfortably in the cot, in no acute distress, no respiratory distress, speaking full sentences. Vital signs upon arrival are within normal limits including patient being afebrile, nontachycardic, nontachypneic, nontoxic-appearing. Patient has a GCS of 15 is alert, oriented, answering all questions appropriately. Plan for cardiac work-up to include CBC, BMP, chest x-ray, EKG, troponin. Pending negative troponin and negative CBC, BMP we will plan for likely discharge home. Suspect that patient does have a pneumonia. Pending x-ray imaging will decide on need for antibiotics. Low suspicion for PE at this time as patient has no PE or DVT history, has no unilateral leg swelling. Saturating 97% on room air. Low suspicion for MI as patient is not having any chest pain. Low suspicion for ACS. Will provide aspirin per ACS protocol. EMERGENCY DEPARTMENT COURSE:  ED Course as of 05/18/22 2051   Wed May 18, 2022   2000 XR CHEST PORTABLE    FINDINGS:  Stable right cardiophrenic fat pad. .The cardiac size is normal. No acute  infiltrates or pleural effusions are seen. Pulmonary vascularity appears  normal.. Crystal Jenny No acute bony abnormalities.  The hilar structures are normal.     IMPRESSION:  No acute cardiopulmonary disease [MA]   4875 Basic Metabolic Panel(!):    GLUCOSE, FASTING,GF 98   BUN,BUNPL 15   Creatinine 0.59   CALCIUM, SERUM, 084074 9.0   Sodium 140   Potassium 3.6(!)   Chloride 105   CO2 21   Anion Gap 14   GFR Non- >60   GFR  >60   GFR Comment       Within normal limits [MA]   2049 CBC with Auto Differential(!):    WBC 9.5   RBC 4.46   Hemoglobin Quant 12.8   Hematocrit 41.0   MCV 91.9   MCH 28.7   MCHC 31.2   RDW 13.3   Platelet Count 675   MPV 10.6   NRBC Automated 0.0   Seg Neutrophils 69(!)   Lymphocytes 16(!)   Monocytes 12   Eosinophils % 2   Basophils 1   Immature Granulocytes 0   Segs Absolute 6.60   Absolute Lymph # 1.49   Absolute Mono # 1.10   Absolute Eos # 0.23   Basophils Absolute 0.08   Absolute Immature Granulocyte 0.03  Normal limits [MA]   2049 Magnesium:    Magnesium 1.8  No significant derangement [MA]   2049 Troponin:    Troponin, High Sensitivity <6  Less than 6, no active chest pain. No indication to repeat. [MA]   2049 XR CHEST PORTABLE  FINDINGS:  Stable right cardiophrenic fat pad. .The cardiac size is normal. No acute  infiltrates or pleural effusions are seen. Pulmonary vascularity appears  normal.. [place] No acute bony abnormalities. The hilar structures are normal.     IMPRESSION:  No acute cardiopulmonary disease [MA]   2050 By x-ray imaging being read as negative upon review of x-ray does appear that patient has a right middle lobe infiltrate with hazing of the cardiac border. We will plan for initial treatment with azithromycin, written prescription for azithromycin. Patient to follow-up with primary care provider. Will provide information to follow-up with pulmonology if patient should need a pulmonologist.  Strict return precautions provided. Patient expresses understanding of strict return precautions back to me. Patient discharged in stable condition after meeting vitally stable throughout emergency department stay. [MA]      ED Course User Index  [MA] Kaylynn Higgins DO      MIPS 116    Measure Exclusions: The patient is a hospice patient: No  The patient is already on antibiotics, or has used them within the last 30 days: No   This visit resulted in an inpatient admission: No    Measure Questions:  I dispensed or prescribed antibiotics to this patient during this visit:  The patient had a specific medical reason for the dispensing/prescription of an antibiotic. That reason is: concomitant COPD  And asthma.          DIAGNOSTIC RESULTS / EMERGENCYDEPARTMENT COURSE / MDM     LABS:  Labs Reviewed   CBC WITH AUTO DIFFERENTIAL - Abnormal; Notable for the following components:       Result Value    Seg Neutrophils 69 (*)     Lymphocytes 16 (*)     All other components within normal limits   BASIC METABOLIC PANEL - Abnormal; Notable for the following components:    Potassium 3.6 (*)     All other components within normal limits   MAGNESIUM   TROPONIN         RADIOLOGY:  XR CHEST PORTABLE    Result Date: 5/18/2022  EXAMINATION: ONE XRAY VIEW OF THE CHEST 5/18/2022 4:18 pm COMPARISON: 03/13/2022 and 05/30/2021 HISTORY: ORDERING SYSTEM PROVIDED HISTORY: shortness of breath, hx of asthma, ongoing for weeks TECHNOLOGIST PROVIDED HISTORY: shortness of breath, hx of asthma, ongoing for weeks FINDINGS: Stable right cardiophrenic fat pad. .The cardiac size is normal. No acute infiltrates or pleural effusions are seen. Pulmonary vascularity appears normal.. Pamalee Bucker No acute bony abnormalities. The hilar structures are normal.     No acute cardiopulmonary disease         EKG  Normal, normal sinus rhythm, intervals within normal limits including DE, QRS, QT/QTc. No ST segment elevation, no ST segment depression, no T wave abnormalities. Nonacute EKG. All EKG's are interpreted by the Emergency Department Physicianwho either signs or Co-signs this chart in the absence of a cardiologist.        PROCEDURES:  None    CONSULTS:  None      FINAL IMPRESSION      1. Pneumonia of right middle lobe due to infectious organism          DISPOSITION / PLAN     DISPOSITION Decision To Discharge 05/18/2022 08:40:10 PM      PATIENT REFERRED TO:  No follow-up provider specified.     DISCHARGE MEDICATIONS:  New Prescriptions    No medications on file       Dedrick Vale DO  Emergency Medicine Resident    (Please note that portions of this note were completed with a voice recognition program.Efforts were made to edit the dictations but occasionally words are mis-transcribed.)       Dedrick Vale DO  Resident  05/18/22 [id number]

## 2022-05-22 LAB
EKG ATRIAL RATE: 97 BPM
EKG P-R INTERVAL: 164 MS
EKG Q-T INTERVAL: 350 MS
EKG QRS DURATION: 90 MS
EKG QTC CALCULATION (BAZETT): 444 MS
EKG R AXIS: 47 DEGREES
EKG T AXIS: 52 DEGREES
EKG VENTRICULAR RATE: 97 BPM

## 2022-07-16 ENCOUNTER — APPOINTMENT (OUTPATIENT)
Dept: GENERAL RADIOLOGY | Age: 42
End: 2022-07-16
Payer: COMMERCIAL

## 2022-07-16 ENCOUNTER — HOSPITAL ENCOUNTER (EMERGENCY)
Age: 42
Discharge: HOME | End: 2022-07-16
Attending: EMERGENCY MEDICINE
Payer: COMMERCIAL

## 2022-07-16 VITALS
RESPIRATION RATE: 18 BRPM | TEMPERATURE: 97.1 F | WEIGHT: 278 LBS | HEIGHT: 62 IN | DIASTOLIC BLOOD PRESSURE: 93 MMHG | SYSTOLIC BLOOD PRESSURE: 143 MMHG | BODY MASS INDEX: 51.16 KG/M2 | OXYGEN SATURATION: 98 % | HEART RATE: 90 BPM

## 2022-07-16 DIAGNOSIS — M79.604 RIGHT LEG PAIN: Primary | ICD-10-CM

## 2022-07-16 PROCEDURE — 73590 X-RAY EXAM OF LOWER LEG: CPT

## 2022-07-16 PROCEDURE — 99283 EMERGENCY DEPT VISIT LOW MDM: CPT

## 2022-07-16 PROCEDURE — 6370000000 HC RX 637 (ALT 250 FOR IP)

## 2022-07-16 RX ORDER — ACETAMINOPHEN 500 MG
1000 TABLET ORAL ONCE
Status: COMPLETED | OUTPATIENT
Start: 2022-07-16 | End: 2022-07-16

## 2022-07-16 RX ADMIN — ACETAMINOPHEN 1000 MG: 500 TABLET ORAL at 13:35

## 2022-07-16 ASSESSMENT — ENCOUNTER SYMPTOMS
VOMITING: 0
SHORTNESS OF BREATH: 0
ABDOMINAL PAIN: 0
COUGH: 0
EYE PAIN: 0
EYE REDNESS: 0
NAUSEA: 0
WHEEZING: 0
SORE THROAT: 0
RHINORRHEA: 0

## 2022-07-16 ASSESSMENT — PAIN DESCRIPTION - ORIENTATION
ORIENTATION: RIGHT
ORIENTATION: RIGHT

## 2022-07-16 ASSESSMENT — PAIN DESCRIPTION - DESCRIPTORS: DESCRIPTORS: SORE

## 2022-07-16 ASSESSMENT — PAIN SCALES - GENERAL
PAINLEVEL_OUTOF10: 8
PAINLEVEL_OUTOF10: 7

## 2022-07-16 ASSESSMENT — PAIN DESCRIPTION - LOCATION
LOCATION: LEG
LOCATION: LEG

## 2022-07-16 NOTE — ED NOTES
This patient was assessed by the doctor only. Nurse processed and completed the orders from this doctor ie labs, meds, and/or EKG.         Lauren Gonzalez RN  07/16/22 [id number]

## 2022-07-16 NOTE — ED PROVIDER NOTES
[address] ED     Emergency Department     Faculty Attestation        I performed a history and physical examination of the patient and discussed management with the resident. I reviewed the residents note and agree with the documented findings and plan of care. Any areas of disagreement are noted on the chart. I was personally present for the key portions of any procedures. I have documented in the chart those procedures where I was not present during the key portions. I have reviewed the emergency nurses triage note. I agree with the chief complaint, past medical history, past surgical history, allergies, medications, social and family history as documented unless otherwise noted below. For mid-level providers such as nurse practitioners as well as physicians assistants:    I have personally seen and evaluated the patient. I find the patient's history and physical exam are consistent with NP/PA documentation. I agree with the care provided, treatment rendered, disposition, & follow-up plan. Additional findings are as noted. Vital Signs: BP (!) 143/93   Pulse 90   Temp 97.1 °F (36.2 °C) (Oral)   Resp 20   Ht 5' 2\" (1.575 m)   Wt 278 lb (126.1 kg)   SpO2 98%   BMI 50.85 kg/m²   PCP:  Wilfredo Murcia    Pertinent Comments:     Patient complains of right anterior shin pain. Denies any falls or trauma she is tender in the anterior shin with is no erythema or warmth she has no calf tenderness no knee or ankle tenderness she has +2 palpable teal DP PT pulses no signs suggest infection or vascular occlusion.       Critical Care  None          Sarabjit Ortiz MD    Attending Emergency Medicine Physician            Hamlet Dawson MD  07/16/22 1257

## 2022-07-16 NOTE — ED PROVIDER NOTES
Social History Narrative    Not on file     Social Determinants of Health     Financial Resource Strain: Not on file   Food Insecurity: Not on file   Transportation Needs: Not on file   Physical Activity: Not on file   Stress: Not on file   Social Connections: Not on file   Intimate Partner Violence: Not on file   Housing Stability: Not on file       No family history on file. Allergies:  Latex, Ampicillin, Bee venom, Codeine, Morphine, Pcn [penicillins], and Adhesive tape    Home Medications:  Prior to Admission medications    Medication Sig Start Date End Date Taking? Authorizing Provider   Escitalopram Oxalate (LEXAPRO PO) Take by mouth    Historical Provider, MD   benzonatate (TESSALON PERLES) 100 MG capsule Take 1 capsule by mouth every 8 hours as needed for Cough 3/13/22   Nely Rodrigez MD   acetaminophen (APAP EXTRA STRENGTH) 500 MG tablet Take 2 tablets by mouth every 6 hours as needed for Pain 10/24/21   Tiago Tubbs, DO   ibuprofen (ADVIL;MOTRIN) 600 MG tablet Take 1 tablet by mouth every 6 hours as needed for Pain 10/24/21   Tiago Tubbs, DO   Benzocaine-Menthol (CEPACOL) 6-10 MG LOZG lozenge Take 1 lozenge by mouth every 2 hours as needed for Sore Throat 10/24/21   Tiago Tubbs, DO   albuterol sulfate  (90 Base) MCG/ACT inhaler Inhale 2 puffs into the lungs every 6 hours as needed for Wheezing or Shortness of Breath Indications: Wheezing 5/21/21   Maninder Angelo MD   albuterol (PROVENTIL) (2.5 MG/3ML) 0.083% nebulizer solution Take 3 mLs by nebulization every 6 hours as needed for Wheezing 5/21/21   Maninder Angelo MD   ondansetron [facility]) 4 MG/2ML injection Infuse 2 mLs intravenously once as needed for Nausea for 1 dose. 7/17/13   Milas Hodgkins, DDS   citalopram (CELEXA) 40 MG tablet Take 40 mg by mouth every morning. Historical Provider, MD   traZODone (DESYREL) 50 MG tablet Take 50 mg by mouth nightly.     Historical Provider, MD       REVIEW OF SYSTEMS    (2-9 systems for level 4, 10 or more for level 5)      Review of Systems   Constitutional:  Negative for chills, fatigue and fever. HENT:  Negative for ear pain, rhinorrhea and sore throat. Eyes:  Negative for pain, redness and visual disturbance. Respiratory:  Negative for cough, shortness of breath and wheezing. Cardiovascular:  Negative for chest pain, palpitations and leg swelling. Gastrointestinal:  Negative for abdominal pain, nausea and vomiting. Genitourinary:  Negative for dysuria, frequency and hematuria. Musculoskeletal:  Negative for arthralgias, gait problem, joint swelling and myalgias. Positive for right lower extremity pain. Neurological:  Negative for dizziness, weakness and headaches. PHYSICAL EXAM   (up to 7 for level 4, 8 or more for level 5)      INITIAL VITALS:   BP (!) 143/93   Pulse 90   Temp 97.1 °F (36.2 °C) (Oral)   Resp 20   Ht 5' 2\" (1.575 m)   Wt 278 lb (126.1 kg)   SpO2 98%   BMI 50.85 kg/m²     Physical Exam  Constitutional:       General: She is not in acute distress. Appearance: Normal appearance. She is not diaphoretic. HENT:      Head: Normocephalic and atraumatic. Cardiovascular:      Rate and Rhythm: Normal rate and regular rhythm. Pulses: Normal pulses. Heart sounds: Normal heart sounds. No murmur heard. No friction rub. No gallop. Pulmonary:      Effort: Pulmonary effort is normal.      Breath sounds: Normal breath sounds. No wheezing, rhonchi or rales. Abdominal:      General: Bowel sounds are normal.      Palpations: Abdomen is soft. Tenderness: There is no abdominal tenderness. Musculoskeletal:         General: No signs of injury. Normal range of motion. Cervical back: Normal range of motion and neck supple. Right lower leg: Tenderness present. No edema. Left lower leg: No tenderness. No edema. Comments: DP pulses 2+, normal sensation in toes and feet, cap refill less than 2 seconds.    Neurological: Mental Status: She is alert. DIFFERENTIAL  DIAGNOSIS     PLAN (LABS / IMAGING / EKG):  Orders Placed This Encounter   Procedures    XR TIBIA FIBULA RIGHT (2 VIEWS)       MEDICATIONS ORDERED:  Orders Placed This Encounter   Medications    acetaminophen (TYLENOL) tablet 1,000 mg       DDX: acute peripheral vascular disease, neuropathy DM, fracture    DIAGNOSTIC RESULTS / EMERGENCY DEPARTMENT COURSE / MDM   LAB RESULTS:  No results found for this visit on 07/16/22. IMPRESSION:     RADIOLOGY:  XR TIBIA FIBULA RIGHT (2 VIEWS)   Preliminary Result   1. No acute osseous abnormality in the right tibia or fibula. 2.  Scattered   reticulation of the subcutaneous fat, suggesting subcutaneous edema, which is   nonspecific. EKG      All EKG's are interpreted by the Emergency Department Physician who either signs or Co-signs this chart in the absence of a cardiologist.    EMERGENCY DEPARTMENT COURSE:  Patient is a 29-year-old female comes in with right anterior lower extremity pain. 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 No suspicion for acute peripheral vascular disease patient has good sensation, 2+ DP pulses, capillary fill refill in her toes less than 2 seconds. Low suspicion for DVT. Patient denies calf pain/tenderness, pleuritic chest pain, recent surgeries, recent long distance travel, malignancy diagnosis is, estrogen supplements. Low suspicion for neuropathic pain secondary to diabetes. Patient states she is a prediabetic. Suspicious for possible hairline fracture since patient is on her feet a lot, perhaps she did not realize or may be forgot about injury that would cause a fracture. Plan to have a tib-fib x-ray. Will control patient's pain with Tylenol. X-ray of her tib-fib subcutaneous edema without fractures. Plan is to discharge patient home with a work note for a couple of days with follow-up with her PCP in 1 week.   Patient is agreeable with plan. No notes of EC Admission Criteria type on file. PROCEDURES:      CONSULTS:  None    CRITICAL CARE:           FINAL IMPRESSION      1. Right leg pain          DISPOSITION / PLAN     DISPOSITION        PATIENT REFERRED TO:  [facility] [id number]  [phone]    Schedule an appointment as soon as possible for a visit in 1 week  As needed for further workup. [facility] ED  [address] [place] [address]  [phone]  Go to   If symptoms worsen.     DISCHARGE MEDICATIONS:  New Prescriptions    No medications on file       Kobe Vidal DO  Emergency Medicine Resident    (Please note that portions of thisnote were completed with a voice recognition program.  Efforts were made to edit the dictations but occasionally words are mis-transcribed.)       Kobe Vidal DO  Resident  07/16/22 [id number]

## 2022-07-16 NOTE — Clinical Note
Fany Lee was seen and treated in our emergency department on 7/16/2022. She may return to work on 07/19/2022. If you have any questions or concerns, please don't hesitate to call.       Cindy Levin, DO

## 2022-07-16 NOTE — Clinical Note
Marlen Haskins was seen and treated in our emergency department on 7/16/2022. She may return to work on 07/19/2022. If you have any questions or concerns, please don't hesitate to call.       Josefa Jesus, DO

## 2022-09-10 ENCOUNTER — HOSPITAL ENCOUNTER (EMERGENCY)
Age: 42
Discharge: HOME | End: 2022-09-10
Attending: EMERGENCY MEDICINE
Payer: COMMERCIAL

## 2022-09-10 ENCOUNTER — APPOINTMENT (OUTPATIENT)
Dept: GENERAL RADIOLOGY | Age: 42
End: 2022-09-10
Payer: COMMERCIAL

## 2022-09-10 VITALS
OXYGEN SATURATION: 96 % | DIASTOLIC BLOOD PRESSURE: 78 MMHG | SYSTOLIC BLOOD PRESSURE: 126 MMHG | WEIGHT: 274 LBS | TEMPERATURE: 98.2 F | RESPIRATION RATE: 16 BRPM | HEIGHT: 62 IN | HEART RATE: 97 BPM | BODY MASS INDEX: 50.42 KG/M2

## 2022-09-10 DIAGNOSIS — M25.552 LEFT HIP PAIN: Primary | ICD-10-CM

## 2022-09-10 DIAGNOSIS — M53.3 SACROILIAC JOINT PAIN: ICD-10-CM

## 2022-09-10 PROCEDURE — 99283 EMERGENCY DEPT VISIT LOW MDM: CPT

## 2022-09-10 PROCEDURE — 73502 X-RAY EXAM HIP UNI 2-3 VIEWS: CPT

## 2022-09-10 RX ORDER — KETOROLAC TROMETHAMINE 15 MG/ML
15 INJECTION, SOLUTION INTRAMUSCULAR; INTRAVENOUS ONCE
Status: DISCONTINUED | OUTPATIENT
Start: 2022-09-10 | End: 2022-09-10 | Stop reason: HOSPADM

## 2022-09-10 RX ORDER — MELOXICAM 7.5 MG/1
7.5 TABLET ORAL DAILY
Qty: 30 TABLET | Refills: 3 | Status: SHIPPED | OUTPATIENT
Start: 2022-09-10 | End: 2022-10-26

## 2022-09-10 RX ORDER — ACETAMINOPHEN 500 MG
1000 TABLET ORAL EVERY 8 HOURS PRN
Qty: 30 TABLET | Refills: 0 | Status: SHIPPED | OUTPATIENT
Start: 2022-09-10

## 2022-09-10 ASSESSMENT — PAIN SCALES - GENERAL: PAINLEVEL_OUTOF10: 10

## 2022-09-10 ASSESSMENT — ENCOUNTER SYMPTOMS
BACK PAIN: 0
COUGH: 0
EYE PAIN: 0
SORE THROAT: 0
NAUSEA: 0
COLOR CHANGE: 0
WHEEZING: 0
EYE ITCHING: 0
VOMITING: 0
EYE DISCHARGE: 0
RHINORRHEA: 0

## 2022-09-10 ASSESSMENT — PAIN - FUNCTIONAL ASSESSMENT: PAIN_FUNCTIONAL_ASSESSMENT: 0-10

## 2022-09-10 NOTE — ED PROVIDER NOTES
eMERGENCY dEPARTMENT eNCOUnter   Independent Attestation     Pt Name: Neelima Valverde  MRN: [id number]  [place] 1980  Date of evaluation: 9/10/22     Neelima Valverde is a 38 y.o. female with CC: Hip Pain      Based on the medical record the care appears appropriate. I was personally available for consultation in the Emergency Department. The care is provided during an unprecedented national emergency due to the novel coronavirus, COVID 19.     Koby Vigil DO  Attending Emergency Physician                 Koby Vigil DO  09/10/22 [id number]

## 2022-09-10 NOTE — ED PROVIDER NOTES
[facility] ED  Emergency Department Encounter  Advanced Practice Provider   The care is provided during an unprecedented national emergency due to the novel coronavirus COVID 19    Pt Name: Bulmaro Heard  MRN: [id number]  [place] 1980  Date of evaluation: 9/10/22  PCP:  Raf Larios    CHIEF COMPLAINT       Chief Complaint   Patient presents with    Hip Pain       HISTORY OF PRESENT ILLNESS  (Location/Symptom, Timing/Onset,Context/Setting, Quality, Duration, Modifying Factors, Severity.)      Bulmaro Heard is a 38 y.o. female who presents with left hip pain. Patient states that this is been going on for a couple of months. She states that the pain is mainly present when she is walking, denies any color change to the leg. She does have an intermittent pins and needle sensation that goes from the hip down to the knee. She does report that ibuprofen was helping initially but now is not helping as much. She denies any falls or injury. No back pain. No abdominal pain nausea vomiting. She denies any weakness. PAST MEDICAL /SURGICAL / SOCIAL / FAMILY HISTORY      has a past medical history of Asthma, Bipolar affect, depressed ([address] Environmental allergies, GERD (gastroesophageal reflux disease), Lumbar pain, and PVD (peripheral vascular disease) ([address] has a past surgical history that includes Dilation and curettage of uterus (2010); [place] tooth extraction; and Dental surgery (07/17/2013).     Social History     Socioeconomic History    Marital status: Single     Spouse name: Not on file    Number of children: Not on file    Years of education: Not on file    Highest education level: Not on file   Occupational History    Not on file   Tobacco Use    Smoking status: Never    Smokeless tobacco: Never   Vaping Use    Vaping Use: Never used   Substance and Sexual Activity    Alcohol use: No    Drug use: No    Sexual activity: Not on file   Other Topics Concern    Not on file   Social History Narrative    Not on file     Social Determinants of Health     Financial Resource Strain: Not on file   Food Insecurity: Not on file   Transportation Needs: Not on file   Physical Activity: Not on file   Stress: Not on file   Social Connections: Not on file   Intimate Partner Violence: Not on file   Housing Stability: Not on file       History reviewed. No pertinent family history. Allergies:  Latex, Ampicillin, Bee venom, Codeine, Morphine, Pcn [penicillins], and Adhesive tape    Home Medications:  Prior to Admission medications    Medication Sig Start Date End Date Taking? Authorizing Provider   acetaminophen (APAP EXTRA STRENGTH) 500 MG tablet Take 2 tablets by mouth every 8 hours as needed for Pain 9/10/22  Yes Hermelinda Talavera PA-C   meloxicam (MOBIC) 7.5 MG tablet Take 1 tablet by mouth daily for 14 days 9/10/22 9/24/22 Yes La Hua PA-C   Escitalopram Oxalate (LEXAPRO PO) Take by mouth    Historical Provider, MD   benzonatate (TESSALON PERLES) 100 MG capsule Take 1 capsule by mouth every 8 hours as needed for Cough 3/13/22   Demetri Pulido MD   Benzocaine-Menthol (CEPACOL) 6-10 MG LOZG lozenge Take 1 lozenge by mouth every 2 hours as needed for Sore Throat 10/24/21   Tuan Flower DO   albuterol sulfate  (90 Base) MCG/ACT inhaler Inhale 2 puffs into the lungs every 6 hours as needed for Wheezing or Shortness of Breath Indications: Wheezing 5/21/21   Francisco Figueroa MD   albuterol (PROVENTIL) (2.5 MG/3ML) 0.083% nebulizer solution Take 3 mLs by nebulization every 6 hours as needed for Wheezing 5/21/21   Francisco Figueroa MD   ondansetron [facility]) 4 MG/2ML injection Infuse 2 mLs intravenously once as needed for Nausea for 1 dose. 7/17/13   Heena Au DDS   citalopram (CELEXA) 40 MG tablet Take 40 mg by mouth every morning. Historical Provider, MD   traZODone (DESYREL) 50 MG tablet Take 50 mg by mouth nightly.     Historical Provider, MD       patient's medication list has been reviewed as entered by the nursing staff. REVIEW OF SYSTEMS    (2-9 systems for level 4, 10 or more for level 5)      Review of Systems   Constitutional:  Negative for chills and fever. HENT:  Negative for ear pain, rhinorrhea and sore throat. Eyes:  Negative for pain, discharge and itching. Respiratory:  Negative for cough and wheezing. Cardiovascular:  Negative for chest pain and palpitations. Gastrointestinal:  Negative for nausea and vomiting. Genitourinary:  Negative for difficulty urinating and dysuria. Musculoskeletal:  Positive for arthralgias and myalgias. Negative for back pain. Skin:  Negative for color change and wound. Neurological:  Negative for dizziness and headaches. Psychiatric/Behavioral:  Negative for dysphoric mood. PHYSICAL EXAM  (up to 7 for level 4, 8 or more for level 5)      INITIAL VITALS:  height is 5' 2\" (1.575 m) and weight is 274 lb (124.3 kg). Her oral temperature is 98.2 °F (36.8 °C). Her blood pressure is 126/78 and her pulse is 97. Her respiration is 16 and oxygen saturation is 96%. Physical Exam  Constitutional:       Appearance: She is well-developed. She is not diaphoretic. HENT:      Head: Normocephalic and atraumatic. Right Ear: External ear normal.      Left Ear: External ear normal.   Eyes:      General: No scleral icterus. Right eye: No discharge. Left eye: No discharge. Neck:      Trachea: No tracheal deviation. Cardiovascular:      Rate and Rhythm: Normal rate and regular rhythm. Heart sounds: Normal heart sounds. No murmur heard. No gallop. Pulmonary:      Effort: Pulmonary effort is normal. No respiratory distress. Breath sounds: Normal breath sounds. No stridor. Abdominal:      Tenderness: There is no abdominal tenderness. There is no guarding or rebound. Musculoskeletal:         General: Normal range of motion. Cervical back: Normal range of motion.       Comments: Patient with pain on the lateral portion of the hip with palpation as well as the anterior inguinal fold area. Does have increased pain with passive flexion and internal rotation. Lower extremity strength is 5 out of 5. Distal cap refill is less than 2 seconds   Skin:     General: Skin is warm and dry. Coloration: Skin is not pale. Findings: No rash (on exposed surfaces). Neurological:      Mental Status: She is alert and oriented to person, place, and time. Coordination: Coordination normal.   Psychiatric:         Behavior: Behavior normal.         PLAN (LABS / IMAGING / EKG):  Orders Placed This Encounter   Procedures    XR HIP 2-3 VW W PELVIS LEFT       MEDICATIONS ORDERED:  Orders Placed This Encounter   Medications    ketorolac (TORADOL) injection 15 mg    acetaminophen (APAP EXTRA STRENGTH) 500 MG tablet     Sig: Take 2 tablets by mouth every 8 hours as needed for Pain     Dispense:  30 tablet     Refill:  0    meloxicam (MOBIC) 7.5 MG tablet     Sig: Take 1 tablet by mouth daily for 14 days     Dispense:  30 tablet     Refill:  3       Controlled Substances Monitoring:      DIAGNOSTIC RESULTS / EMERGENCY DEPARTMENT COURSE / MDM     Patient with a several month history of hip pain, no known injury. X-ray does show some mild degenerative changes of the SI joints. Encourage patient to follow-up with her PCP. Do not think there is a septic joint the area is not warm, there is mild tenderness, worse with ambulation    RADIOLOGY:   I directly visualized (with the attending physician) the following  imagesand reviewed the radiologist interpretations:  No results found. XR HIP 2-3 VW W PELVIS LEFT   Final Result   No acute osseous or soft tissue abnormality. Mild degenerative change of the SI joints and hip joints. LABS:  No results found for this visit on 09/10/22. CONSULTS:  None    PROCEDURES:  None    FINAL IMPRESSION      1. Left hip pain    2.  Sacroiliac joint pain          DISPOSITION / PLAN DISPOSITION Decision To Discharge    PATIENT REFERRED TO:  Ameya Huggins  [address] [place] [address]  [phone]    Schedule an appointment as soon as possible for a visit       DISCHARGE MEDICATIONS:  New Prescriptions    MELOXICAM (MOBIC) 7.5 MG TABLET    Take 1 tablet by mouth daily for 14 days       Hermelinda Talavera PA-C   Emergency Medicine Physician Assistant    (Please note that portions of this note were completed with a voice recognition program.  Efforts were made to edit thedictations but occasionally words are mis-transcribed.)        Hermelinda Talavera PA-C  09/10/22 2210

## 2022-09-10 NOTE — DISCHARGE INSTRUCTIONS
Please take medications as prescribed/as needed for the pain in your hip    There are some exercises provided that you may do at home.   You should call your primary care provider on Monday to schedule follow-up as additional evaluation, and/or interventions may be necessary    Return to the emergency department for worsening symptoms, difficulty walking or other emergent concerns

## 2022-09-10 NOTE — ED NOTES
Pt to er with c/o left hip pain. Pt denies known injury. Pt ambulatory. Pt a&ox3. Skin warm and dry. Respirations even and non-labored.       Lety Collazo RN  09/10/22 7212

## 2022-09-26 ENCOUNTER — APPOINTMENT (OUTPATIENT)
Dept: CT IMAGING | Age: 42
End: 2022-09-26
Payer: COMMERCIAL

## 2022-09-26 ENCOUNTER — HOSPITAL ENCOUNTER (EMERGENCY)
Age: 42
Discharge: HOME | End: 2022-09-26
Attending: EMERGENCY MEDICINE
Payer: COMMERCIAL

## 2022-09-26 VITALS
HEART RATE: 100 BPM | RESPIRATION RATE: 16 BRPM | SYSTOLIC BLOOD PRESSURE: 122 MMHG | WEIGHT: 215 LBS | TEMPERATURE: 97.2 F | BODY MASS INDEX: 39.56 KG/M2 | DIASTOLIC BLOOD PRESSURE: 99 MMHG | OXYGEN SATURATION: 94 % | HEIGHT: 62 IN

## 2022-09-26 DIAGNOSIS — I88.0 MESENTERIC ADENITIS: ICD-10-CM

## 2022-09-26 DIAGNOSIS — N30.00 ACUTE CYSTITIS WITHOUT HEMATURIA: Primary | ICD-10-CM

## 2022-09-26 LAB
ABSOLUTE EOS #: 0.04 K/UL (ref 0–0.44)
ABSOLUTE IMMATURE GRANULOCYTE: 0.04 K/UL (ref 0–0.3)
ABSOLUTE LYMPH #: 3.04 K/UL (ref 1.1–3.7)
ABSOLUTE MONO #: 1.2 K/UL (ref 0.1–1.2)
ALBUMIN SERPL-MCNC: 4.3 G/DL (ref 3.5–5.2)
ALBUMIN/GLOBULIN RATIO: 1.2 (ref 1–2.5)
ALP BLD-CCNC: 69 U/L (ref 35–104)
ALT SERPL-CCNC: 43 U/L (ref 5–33)
ANION GAP SERPL CALCULATED.3IONS-SCNC: 15 MMOL/L (ref 9–17)
AST SERPL-CCNC: 28 U/L
BACTERIA: ABNORMAL
BASOPHILS # BLD: 1 % (ref 0–2)
BASOPHILS ABSOLUTE: 0.1 K/UL (ref 0–0.2)
BILIRUB SERPL-MCNC: 0.5 MG/DL (ref 0.3–1.2)
BILIRUBIN URINE: NEGATIVE
BUN BLDV-MCNC: 15 MG/DL (ref 6–20)
CALCIUM SERPL-MCNC: 9.1 MG/DL (ref 8.6–10.4)
CHLORIDE BLD-SCNC: 102 MMOL/L (ref 98–107)
CO2: 23 MMOL/L (ref 20–31)
COLOR: YELLOW
CREAT SERPL-MCNC: 0.64 MG/DL (ref 0.5–0.9)
EOSINOPHILS RELATIVE PERCENT: 0 % (ref 1–4)
EPITHELIAL CELLS UA: ABNORMAL /HPF (ref 0–5)
GFR AFRICAN AMERICAN: >60 ML/MIN
GFR NON-AFRICAN AMERICAN: >60 ML/MIN
GFR SERPL CREATININE-BSD FRML MDRD: ABNORMAL ML/MIN/{1.73_M2}
GLUCOSE BLD-MCNC: 107 MG/DL (ref 70–99)
GLUCOSE URINE: NEGATIVE
HCG QUALITATIVE: NEGATIVE
HCT VFR BLD CALC: 42.8 % (ref 36.3–47.1)
HEMOGLOBIN: 13.4 G/DL (ref 11.9–15.1)
IMMATURE GRANULOCYTES: 0 %
KETONES, URINE: ABNORMAL
LEUKOCYTE ESTERASE, URINE: ABNORMAL
LIPASE: 13 U/L (ref 13–60)
LYMPHOCYTES # BLD: 21 % (ref 24–43)
MCH RBC QN AUTO: 28 PG (ref 25.2–33.5)
MCHC RBC AUTO-ENTMCNC: 31.3 G/DL (ref 28.4–34.8)
MCV RBC AUTO: 89.5 FL (ref 82.6–102.9)
MONOCYTES # BLD: 8 % (ref 3–12)
MUCUS: ABNORMAL
NITRITE, URINE: NEGATIVE
NRBC AUTOMATED: 0 PER 100 WBC
PDW BLD-RTO: 13.6 % (ref 11.8–14.4)
PH UA: 5.5 (ref 5–8)
PLATELET # BLD: 444 K/UL (ref 138–453)
PMV BLD AUTO: 9.6 FL (ref 8.1–13.5)
POTASSIUM SERPL-SCNC: 3.6 MMOL/L (ref 3.7–5.3)
PROTEIN UA: ABNORMAL
RBC # BLD: 4.78 M/UL (ref 3.95–5.11)
RBC UA: ABNORMAL /HPF (ref 0–2)
SEG NEUTROPHILS: 70 % (ref 36–65)
SEGMENTED NEUTROPHILS ABSOLUTE COUNT: 10.06 K/UL (ref 1.5–8.1)
SODIUM BLD-SCNC: 140 MMOL/L (ref 135–144)
SPECIFIC GRAVITY UA: 1.08 (ref 1–1.03)
TOTAL PROTEIN: 7.8 G/DL (ref 6.4–8.3)
TURBIDITY: CLEAR
URINE HGB: ABNORMAL
UROBILINOGEN, URINE: NORMAL
WBC # BLD: 14.5 K/UL (ref 3.5–11.3)
WBC UA: ABNORMAL /HPF (ref 0–5)

## 2022-09-26 PROCEDURE — 85025 COMPLETE CBC W/AUTO DIFF WBC: CPT

## 2022-09-26 PROCEDURE — 2580000003 HC RX 258: Performed by: STUDENT IN AN ORGANIZED HEALTH CARE EDUCATION/TRAINING PROGRAM

## 2022-09-26 PROCEDURE — 99285 EMERGENCY DEPT VISIT HI MDM: CPT

## 2022-09-26 PROCEDURE — 83690 ASSAY OF LIPASE: CPT

## 2022-09-26 PROCEDURE — 80053 COMPREHEN METABOLIC PANEL: CPT

## 2022-09-26 PROCEDURE — 6360000004 HC RX CONTRAST MEDICATION: Performed by: STUDENT IN AN ORGANIZED HEALTH CARE EDUCATION/TRAINING PROGRAM

## 2022-09-26 PROCEDURE — 6370000000 HC RX 637 (ALT 250 FOR IP): Performed by: STUDENT IN AN ORGANIZED HEALTH CARE EDUCATION/TRAINING PROGRAM

## 2022-09-26 PROCEDURE — 81001 URINALYSIS AUTO W/SCOPE: CPT

## 2022-09-26 PROCEDURE — 96372 THER/PROPH/DIAG INJ SC/IM: CPT

## 2022-09-26 PROCEDURE — 74177 CT ABD & PELVIS W/CONTRAST: CPT

## 2022-09-26 PROCEDURE — 6360000002 HC RX W HCPCS: Performed by: STUDENT IN AN ORGANIZED HEALTH CARE EDUCATION/TRAINING PROGRAM

## 2022-09-26 PROCEDURE — 96374 THER/PROPH/DIAG INJ IV PUSH: CPT

## 2022-09-26 PROCEDURE — 84703 CHORIONIC GONADOTROPIN ASSAY: CPT

## 2022-09-26 RX ORDER — MAGNESIUM HYDROXIDE/ALUMINUM HYDROXICE/SIMETHICONE 120; 1200; 1200 MG/30ML; MG/30ML; MG/30ML
30 SUSPENSION ORAL
Status: COMPLETED | OUTPATIENT
Start: 2022-09-26 | End: 2022-09-26

## 2022-09-26 RX ORDER — 0.9 % SODIUM CHLORIDE 0.9 %
1000 INTRAVENOUS SOLUTION INTRAVENOUS ONCE
Status: COMPLETED | OUTPATIENT
Start: 2022-09-26 | End: 2022-09-26

## 2022-09-26 RX ORDER — DICYCLOMINE HYDROCHLORIDE 10 MG/ML
20 INJECTION INTRAMUSCULAR ONCE
Status: COMPLETED | OUTPATIENT
Start: 2022-09-26 | End: 2022-09-26

## 2022-09-26 RX ORDER — ONDANSETRON 2 MG/ML
4 INJECTION INTRAMUSCULAR; INTRAVENOUS ONCE
Status: COMPLETED | OUTPATIENT
Start: 2022-09-26 | End: 2022-09-26

## 2022-09-26 RX ORDER — NITROFURANTOIN 25; 75 MG/1; MG/1
100 CAPSULE ORAL 2 TIMES DAILY
Qty: 20 CAPSULE | Refills: 0 | Status: SHIPPED | OUTPATIENT
Start: 2022-09-26 | End: 2022-10-06

## 2022-09-26 RX ORDER — ONDANSETRON 4 MG/1
4 TABLET, ORALLY DISINTEGRATING ORAL EVERY 8 HOURS PRN
Qty: 5 TABLET | Refills: 0 | Status: SHIPPED | OUTPATIENT
Start: 2022-09-26

## 2022-09-26 RX ADMIN — SODIUM CHLORIDE 1000 ML: 9 INJECTION, SOLUTION INTRAVENOUS at 02:22

## 2022-09-26 RX ADMIN — IOPAMIDOL 75 ML: 755 INJECTION, SOLUTION INTRAVENOUS at 02:55

## 2022-09-26 RX ADMIN — DICYCLOMINE HYDROCHLORIDE 20 MG: 10 INJECTION, SOLUTION INTRAMUSCULAR at 02:11

## 2022-09-26 RX ADMIN — ONDANSETRON 4 MG: 2 INJECTION INTRAMUSCULAR; INTRAVENOUS at 02:20

## 2022-09-26 RX ADMIN — ALUMINUM HYDROXIDE, MAGNESIUM HYDROXIDE, AND SIMETHICONE 30 ML: 200; 200; 20 SUSPENSION ORAL at 02:22

## 2022-09-26 ASSESSMENT — ENCOUNTER SYMPTOMS
ABDOMINAL PAIN: 1
RHINORRHEA: 0
BACK PAIN: 0
VOMITING: 1
SHORTNESS OF BREATH: 0

## 2022-09-26 NOTE — ED NOTES
Pt. Discharge instructions reviewed. Pt has no further questions at this time.       Juliette Gonzalez RN  09/26/22 [id number]

## 2022-09-26 NOTE — ED PROVIDER NOTES
[facility]  ED  Emergency Department Encounter  Emergency Medicine Resident     Pt Nirmal Menendez  MRN: [id number]  [place] 1980  Date of evaluation: 9/26/22  PCP:  Karolina Gar      CHIEF COMPLAINT       Chief Complaint   Patient presents with    Emesis     Pt states she has emesis x2 days. Pt denies chills or fever. HISTORY OF PRESENT ILLNESS  (Location/Symptom, Timing/Onset, Context/Setting, Quality, Duration, Modifying Factors, Severity.)      Brett Garza is a 38 y.o. female who presents with emesis. Patient nodding head yes and no to several questions, reports that the vomiting spent for the past 2 days denies any chills or fever. Reports vague abdominal pain that is possibly generalized but also pointing to the right lower quadrant. Reports that its been constant and has not taken any medication for her abdominal pain. Reports pain has been 6/10. Nods yes to vomiting. PAST MEDICAL / SURGICAL / SOCIAL / FAMILY HISTORY      has a past medical history of Asthma, Bipolar affect, depressed ([address] Environmental allergies, GERD (gastroesophageal reflux disease), Lumbar pain, and PVD (peripheral vascular disease) ([address] has a past surgical history that includes Dilation and curettage of uterus (2010); [place] tooth extraction; and Dental surgery (07/17/2013).     Social History     Socioeconomic History    Marital status: Single     Spouse name: Not on file    Number of children: Not on file    Years of education: Not on file    Highest education level: Not on file   Occupational History    Not on file   Tobacco Use    Smoking status: Never    Smokeless tobacco: Never   Vaping Use    Vaping Use: Never used   Substance and Sexual Activity    Alcohol use: No    Drug use: No    Sexual activity: Not on file   Other Topics Concern    Not on file   Social History Narrative    Not on file     Social Determinants of Health     Financial Resource Strain: Not on file   Food Insecurity: Not on file   Transportation Needs: Not on file   Physical Activity: Not on file   Stress: Not on file   Social Connections: Not on file   Intimate Partner Violence: Not on file   Housing Stability: Not on file       History reviewed. No pertinent family history. Allergies:  Latex, Ampicillin, Bee venom, Codeine, Morphine, Pcn [penicillins], and Adhesive tape    Home Medications:  Prior to Admission medications    Medication Sig Start Date End Date Taking? Authorizing Provider   ondansetron (ZOFRAN ODT) 4 MG disintegrating tablet Take 1 tablet by mouth every 8 hours as needed for Nausea 9/26/22  Yes Vee Ovalle, DO   nitrofurantoin, macrocrystal-monohydrate, (MACROBID) 100 MG capsule Take 1 capsule by mouth 2 times daily for 10 days 9/26/22 10/6/22 Yes Vee Ovalle DO   acetaminophen (APAP EXTRA STRENGTH) 500 MG tablet Take 2 tablets by mouth every 8 hours as needed for Pain 9/10/22   Milvia Clayton PA-C   meloxicam (MOBIC) 7.5 MG tablet Take 1 tablet by mouth daily for 14 days 9/10/22 9/24/22  Milvia Clayton PA-C   Escitalopram Oxalate (LEXAPRO PO) Take by mouth    Historical Provider, MD   benzonatate (TESSALON PERLES) 100 MG capsule Take 1 capsule by mouth every 8 hours as needed for Cough 3/13/22   Seymour Jensen MD   Benzocaine-Menthol (CEPACOL) 6-10 MG LOZG lozenge Take 1 lozenge by mouth every 2 hours as needed for Sore Throat 10/24/21   Leslie Mann DO   albuterol sulfate  (90 Base) MCG/ACT inhaler Inhale 2 puffs into the lungs every 6 hours as needed for Wheezing or Shortness of Breath Indications: Wheezing 5/21/21   Mary Jain MD   albuterol (PROVENTIL) (2.5 MG/3ML) 0.083% nebulizer solution Take 3 mLs by nebulization every 6 hours as needed for Wheezing 5/21/21   Mary Jain MD   [facility]) 4 MG/2ML injection Infuse 2 mLs intravenously once as needed for Nausea for 1 dose.  7/17/13   Pj Byers, DDS   citalopram (CELEXA) 40 MG tablet Take 40 mg by mouth every morning. Historical Provider, MD   traZODone (DESYREL) 50 MG tablet Take 50 mg by mouth nightly. Historical Provider, MD       REVIEW OF SYSTEMS    (2-9 systems for level 4, 10 or more for level 5)      Review of Systems   Constitutional:  Negative for fever. HENT:  Negative for rhinorrhea. Respiratory:  Negative for shortness of breath. Cardiovascular:  Negative for chest pain. Gastrointestinal:  Positive for abdominal pain and vomiting. Genitourinary:  Negative for dysuria. Musculoskeletal:  Negative for back pain. Skin:  Negative for wound. Allergic/Immunologic: Positive for environmental allergies. Neurological:  Negative for dizziness. Hematological: Negative. Psychiatric/Behavioral:  Negative for confusion. PHYSICAL EXAM   (up to 7 for level 4, 8 or more for level 5)      INITIAL VITALS:   BP (!) 122/99   Pulse 100   Temp 97.2 °F (36.2 °C) (Oral)   Resp 16   Ht 5' 2\" (1.575 m)   Wt 215 lb (97.5 kg)   LMP 09/09/2022   SpO2 94%   BMI 39.32 kg/m²     Physical Exam  Constitutional:       General: She is not in acute distress. Appearance: She is not ill-appearing. Comments: Keeps eyes closed, opens eye on request, nods to most questions. HENT:      Head: Normocephalic and atraumatic. Right Ear: External ear normal.      Left Ear: External ear normal.      Nose: Nose normal.   Eyes:      Conjunctiva/sclera: Conjunctivae normal.   Cardiovascular:      Rate and Rhythm: Normal rate. Pulses: Normal pulses. Pulmonary:      Effort: Pulmonary effort is normal. No respiratory distress. Abdominal:      General: Abdomen is flat. There is no distension. Palpations: Abdomen is soft. Tenderness: abdominal tenderness There is no guarding or rebound. Comments: Rlq tenderness to palpation   Musculoskeletal:         General: No swelling. Cervical back: No tenderness. Skin:     General: Skin is warm.       Capillary Refill: Capillary 0.10 - 1.20 k/uL    Absolute Eos # 0.04 0.00 - 0.44 k/uL    Basophils Absolute 0.10 0.00 - 0.20 k/uL    Absolute Immature Granulocyte 0.04 0.00 - 0.30 k/uL   Comprehensive Metabolic Panel   Result Value Ref Range    Glucose 107 (H) 70 - 99 mg/dL    BUN 15 6 - 20 mg/dL    Creatinine 0.64 0.50 - 0.90 mg/dL    Calcium 9.1 8.6 - 10.4 mg/dL    Sodium 140 135 - 144 mmol/L    Potassium 3.6 (L) 3.7 - 5.3 mmol/L    Chloride 102 98 - 107 mmol/L    CO2 23 20 - 31 mmol/L    Anion Gap 15 9 - 17 mmol/L    Alkaline Phosphatase 69 35 - 104 U/L    ALT 43 (H) 5 - 33 U/L    AST 28 <32 U/L    Total Bilirubin 0.5 0.3 - 1.2 mg/dL    Total Protein 7.8 6.4 - 8.3 g/dL    Albumin 4.3 3.5 - 5.2 g/dL    Albumin/Globulin Ratio 1.2 1.0 - 2.5    GFR Non-African American >60 >60 mL/min    GFR African American >60 >60 mL/min    GFR Comment         Lipase   Result Value Ref Range    Lipase 13 13 - 60 U/L   Urinalysis with Microscopic   Result Value Ref Range    Color, UA Yellow Yellow    Turbidity UA Clear Clear    Glucose, Ur NEGATIVE NEGATIVE    Bilirubin Urine NEGATIVE NEGATIVE    Ketones, Urine MODERATE (A) NEGATIVE    Specific Gravity, UA 1.081 (H) 1.005 - 1.030    Urine Hgb SMALL (A) NEGATIVE    pH, UA 5.5 5.0 - 8.0    Protein, UA TRACE (A) NEGATIVE    Urobilinogen, Urine Normal Normal    Nitrite, Urine NEGATIVE NEGATIVE    Leukocyte Esterase, Urine MODERATE (A) NEGATIVE    WBC, UA TOO NUMEROUS TO COUNT 0 - 5 /HPF    RBC, UA 2 TO 5 0 - 2 /HPF    Epithelial Cells UA 10 TO 20 0 - 5 /HPF    Bacteria, UA MODERATE (A) None    Mucus, UA 1+ (A) None   HCG Qualitative, Serum   Result Value Ref Range    hCG Qual NEGATIVE NEGATIVE       RADIOLOGY:  CT ABDOMEN PELVIS W IV CONTRAST Additional Contrast? None   Final Result   1. No evidence for acute appendicitis. 2.  Hepatic steatosis. 3.  Nonspecific induration of the root of the mesentery, which may represent   mesenteric adenitis.              EMERGENCY DEPARTMENT COURSE:    ED Course as of 09/26/22 0506   Mon Sep 26, 2022   0220 WBC(!): 14.5  Possible infectious source. [ML]   0236 ALT(!): 43  ALT stable from previous [ML]   0328 Possible mesenteric adenitis. [ML]   4867 WBC, UA: TOO NUMEROUS TO COUNT [ML]   0446 Bacteria, UA(!): MODERATE  UTI, will start keflex. [ML]      ED Course User Index  [ML] Lucretia Anthony DO     Due to patient's allergy and no history of cephalosporin use. We will start her on Macrobid. FINAL IMPRESSION      1. Acute cystitis without hematuria    2.  Mesenteric adenitis          DISPOSITION / PLAN     DISPOSITION Decision To Discharge 09/26/2022 04:11:10 AM      PATIENT REFERRED TO:  Jethro Randolph  [address] [place] [id number] [phone]    Schedule an appointment as soon as possible for a visit in 1 day      DISCHARGE MEDICATIONS:  Discharge Medication List as of 9/26/2022  4:48 AM        START taking these medications    Details   ondansetron (ZOFRAN ODT) 4 MG disintegrating tablet Take 1 tablet by mouth every 8 hours as needed for Nausea, Disp-5 tablet, R-0Normal      nitrofurantoin, macrocrystal-monohydrate, (MACROBID) 100 MG capsule Take 1 capsule by mouth 2 times daily for 10 days, Disp-20 capsule, R-0Normal             Bridgett Lux DO  Emergency Medicine Resident    (Please note that portions of thisnote were completed with a voice recognition program.  Efforts were made to edit the dictations but occasionally words are mis-transcribed.)       Lucretia Anthony DO  Resident  09/26/22 2360

## 2022-09-26 NOTE — ED PROVIDER NOTES
[facility]     Emergency Department     Faculty Attestation    I performed a history and physical examination of the patient and discussed management with the resident. I have reviewed and agree with the residents findings including all diagnostic interpretations, and treatment plans as written. Any areas of disagreement are noted on the chart. I was personally present for the key portions of any procedures. I have documented in the chart those procedures where I was not present during the key portions. I have reviewed the emergency nurses triage note. I agree with the chief complaint, past medical history, past surgical history, allergies, medications, social and family history as documented unless otherwise noted below. Documentation of the HPI, Physical Exam and Medical Decision Making performed by scribdianna is based on my personal performance of the HPI, PE and MDM. For Physician Assistant/ Nurse Practitioner cases/documentation I have personally evaluated this patient and have completed at least one if not all key elements of the E/M (history, physical exam, and MDM). Additional findings are as noted. 39 yo F c/o nausea & vomiting, no fever, no injury, no cp, c/o mild sore throat,   Pe vss Jana RN escort for exam: gcs 15 mitul, posterior pharynx clear, moist membrane, no dental abscess, neck supple, abdomen non tender, no distension, no rigidity,     Ct abdomen stable, lipase 13, cr 0.64  Vss, >> treating uti    EKG Interpretation    Interpreted by me      CRITICAL CARE: There was a high probability of clinically significant/life threatening deterioration in this patient's condition which required my urgent intervention. Total critical care time was 5 minutes. This excludes any time for separately reportable procedures.        [place],   09/26/22 [address] [place] [address],   09/26/22 9211       Nicko Gutierres, DO  09/26/22 7272

## 2022-10-20 ENCOUNTER — OFFICE VISIT (OUTPATIENT)
Dept: FAMILY MEDICINE CLINIC | Age: 42
End: 2022-10-20
Payer: COMMERCIAL

## 2022-10-20 VITALS
DIASTOLIC BLOOD PRESSURE: 88 MMHG | HEART RATE: 86 BPM | SYSTOLIC BLOOD PRESSURE: 122 MMHG | HEIGHT: 62 IN | BODY MASS INDEX: 12.95 KG/M2 | OXYGEN SATURATION: 98 % | WEIGHT: 70.38 LBS

## 2022-10-20 DIAGNOSIS — M25.552 LEFT HIP PAIN: ICD-10-CM

## 2022-10-20 DIAGNOSIS — R73.03 PREDIABETES: Primary | ICD-10-CM

## 2022-10-20 DIAGNOSIS — Z00.00 ROUTINE HEALTH MAINTENANCE: ICD-10-CM

## 2022-10-20 DIAGNOSIS — J45.20 MILD INTERMITTENT ASTHMA WITHOUT COMPLICATION: ICD-10-CM

## 2022-10-20 DIAGNOSIS — F31.9 BIPOLAR AFFECTIVE DISORDER, REMISSION STATUS UNSPECIFIED (HCC): ICD-10-CM

## 2022-10-20 DIAGNOSIS — J45.909 UNCOMPLICATED ASTHMA, UNSPECIFIED ASTHMA SEVERITY, UNSPECIFIED WHETHER PERSISTENT: ICD-10-CM

## 2022-10-20 PROCEDURE — G8419 CALC BMI OUT NRM PARAM NOF/U: HCPCS

## 2022-10-20 PROCEDURE — G8482 FLU IMMUNIZE ORDER/ADMIN: HCPCS

## 2022-10-20 PROCEDURE — G0008 ADMIN INFLUENZA VIRUS VAC: HCPCS | Performed by: FAMILY MEDICINE

## 2022-10-20 PROCEDURE — G8427 DOCREV CUR MEDS BY ELIG CLIN: HCPCS

## 2022-10-20 PROCEDURE — 1036F TOBACCO NON-USER: CPT

## 2022-10-20 PROCEDURE — 99203 OFFICE O/P NEW LOW 30 MIN: CPT

## 2022-10-20 RX ORDER — ALBUTEROL SULFATE 90 UG/1
2 AEROSOL, METERED RESPIRATORY (INHALATION) EVERY 6 HOURS PRN
Qty: 1 EACH | Refills: 1 | Status: SHIPPED | OUTPATIENT
Start: 2022-10-20

## 2022-10-20 RX ORDER — LAMOTRIGINE 25 MG/1
25 TABLET ORAL DAILY
COMMUNITY

## 2022-10-20 ASSESSMENT — PATIENT HEALTH QUESTIONNAIRE - PHQ9
SUM OF ALL RESPONSES TO PHQ QUESTIONS 1-9: 0
SUM OF ALL RESPONSES TO PHQ9 QUESTIONS 1 & 2: 0
2. FEELING DOWN, DEPRESSED OR HOPELESS: 0
SUM OF ALL RESPONSES TO PHQ QUESTIONS 1-9: 0
1. LITTLE INTEREST OR PLEASURE IN DOING THINGS: 0
SUM OF ALL RESPONSES TO PHQ QUESTIONS 1-9: 0
SUM OF ALL RESPONSES TO PHQ QUESTIONS 1-9: 0

## 2022-10-20 ASSESSMENT — ENCOUNTER SYMPTOMS
CONSTIPATION: 0
ABDOMINAL PAIN: 0
SHORTNESS OF BREATH: 0
VOMITING: 0
NAUSEA: 0
DIARRHEA: 0

## 2022-10-20 NOTE — PROGRESS NOTES
Subjective:    Leona Cesar is a 38 y.o. female with  has a past medical history of Asthma, Bipolar affect, depressed ([address] Environmental allergies, GERD (gastroesophageal reflux disease), Lumbar pain, and PVD (peripheral vascular disease) ([address] Presented to the office today for:  Chief Complaint   Patient presents with    New Patient     Est care        HPI    Patient is a 40-year-old female with past medical history of vitamin D deficiency, bipolar disorder, obesity presented to the clinic today as a new patient to establish care. Patient is here with her caregiver  Patient was recently in the hospital on September 26, 2022 initially with emesis. Patient's urinalysis at the ED came back positive for UTI, started on a course of Keflex. Patient today feels a lot better. Patient follows up with psychiatrist, gets her medication from the [facility] center. Patient is on Celexa 20 mg, lamotrigine 25 mg, and vitamin D. Patient last PCP moved to [place]. Patient has been complaining of left hip pain, x-rays showed mild degenerative disease. Patient has no other concerns at this time. Review of Systems   Constitutional:  Negative for chills and fever. Respiratory:  Negative for shortness of breath. Cardiovascular:  Negative for chest pain. Gastrointestinal:  Negative for abdominal pain, constipation, diarrhea, nausea and vomiting. Musculoskeletal:  Positive for arthralgias. Neurological:  Negative for headaches.                The patient has a   Family History   Problem Relation Age of Onset    Heart Attack Mother     No Known Problems Father        Objective:    /88 (Site: Left Lower Arm, Position: Sitting, Cuff Size: Large Adult) Comment: manual  Pulse 86   Ht 5' 2.01\" (1.575 m)   Wt 70 lb 6 oz (31.9 kg)   LMP 10/03/2022 (Within Days)   SpO2 98%   BMI 12.87 kg/m²    BP Readings from Last 3 Encounters:   10/20/22 122/88   09/26/22 (!) 122/99   09/10/22 126/78 Physical Exam  Constitutional:       Appearance: Normal appearance. HENT:      Head: Normocephalic and atraumatic. Cardiovascular:      Rate and Rhythm: Normal rate and regular rhythm. Pulses: Normal pulses. Heart sounds: Normal heart sounds. No murmur heard. No friction rub. No gallop. Pulmonary:      Effort: Pulmonary effort is normal. No respiratory distress. Breath sounds: Normal breath sounds. No stridor. No wheezing, rhonchi or rales. Abdominal:      General: Abdomen is flat. There is no distension. Palpations: Abdomen is soft. There is no mass. Tenderness: There is no abdominal tenderness. There is no guarding. Neurological:      Mental Status: She is alert. Lab Results   Component Value Date    WBC 14.5 (H) 09/26/2022    HGB 13.4 09/26/2022    HCT 42.8 09/26/2022     09/26/2022    ALT 43 (H) 09/26/2022    AST 28 09/26/2022     09/26/2022    K 3.6 (L) 09/26/2022     09/26/2022    CREATININE 0.64 09/26/2022    BUN 15 09/26/2022    CO2 23 09/26/2022     Lab Results   Component Value Date    CALCIUM 9.1 09/26/2022     No results found for: LDLCALC, LDLCHOLESTEROL, LDLDIRECT    Assessment and Plan:    1. Prediabetes  - Hemoglobin A1C; Future    2. Routine health maintenance    - HIV Screen; Future  - Hepatitis C Antibody; Future  - Influenza, AFLURIA, (age 1 y+), IM, Preservative Free, 0.5 mL  - Lipid Panel; Future    3. Uncomplicated asthma, unspecified asthma severity, unspecified whether persistent    - albuterol sulfate HFA (PROVENTIL;VENTOLIN;PROAIR) 108 (90 Base) MCG/ACT inhaler; Inhale 2 puffs into the lungs every 6 hours as needed for Wheezing or Shortness of Breath Indications: Wheezing  Dispense: 1 each; Refill: 1    4. Left hip pain  -X-ray of the left hip on 09/10/2022 showed mild degenerative change of the S1 joint and hip joint  - diclofenac sodium (VOLTAREN) 1 % GEL;  Apply 4 g topically 4 times daily  Dispense: 50 g; Refill:

## 2022-10-26 ENCOUNTER — TELEPHONE (OUTPATIENT)
Dept: FAMILY MEDICINE CLINIC | Age: 42
End: 2022-10-26

## 2022-10-26 DIAGNOSIS — M25.552 LEFT HIP PAIN: ICD-10-CM

## 2022-10-26 DIAGNOSIS — J45.909 UNCOMPLICATED ASTHMA, UNSPECIFIED ASTHMA SEVERITY, UNSPECIFIED WHETHER PERSISTENT: Primary | ICD-10-CM

## 2022-10-26 RX ORDER — CYCLOBENZAPRINE HCL 10 MG
10 TABLET ORAL NIGHTLY PRN
Qty: 14 TABLET | Refills: 0 | Status: SHIPPED | OUTPATIENT
Start: 2022-10-26 | End: 2022-11-09

## 2022-10-26 NOTE — TELEPHONE ENCOUNTER
Patient called office to inform PCP that she is having severe back pain. Patient confirms she received the Voltaren Gel and is using it 3 times daily. Writer informed patient she needs to use 4 times daily and that it may take some time to work. Patient just received it 6 days ago. Patient is reporting 10/10 pain. Please advise.

## 2022-11-10 ENCOUNTER — HOSPITAL ENCOUNTER (EMERGENCY)
Age: 42
Discharge: HOME | End: 2022-11-10
Attending: EMERGENCY MEDICINE
Payer: COMMERCIAL

## 2022-11-10 VITALS
DIASTOLIC BLOOD PRESSURE: 92 MMHG | OXYGEN SATURATION: 97 % | SYSTOLIC BLOOD PRESSURE: 123 MMHG | TEMPERATURE: 97.5 F | WEIGHT: 227 LBS | RESPIRATION RATE: 16 BRPM | BODY MASS INDEX: 41.51 KG/M2 | HEART RATE: 74 BPM

## 2022-11-10 DIAGNOSIS — M62.838 SPASM OF MUSCLE: Primary | ICD-10-CM

## 2022-11-10 PROCEDURE — 6370000000 HC RX 637 (ALT 250 FOR IP): Performed by: EMERGENCY MEDICINE

## 2022-11-10 PROCEDURE — 99283 EMERGENCY DEPT VISIT LOW MDM: CPT

## 2022-11-10 RX ORDER — IBUPROFEN 800 MG/1
800 TABLET ORAL ONCE
Status: COMPLETED | OUTPATIENT
Start: 2022-11-10 | End: 2022-11-10

## 2022-11-10 RX ORDER — CYCLOBENZAPRINE HCL 10 MG
10 TABLET ORAL NIGHTLY PRN
Qty: 15 TABLET | Refills: 0 | Status: SHIPPED | OUTPATIENT
Start: 2022-11-10 | End: 2022-11-20

## 2022-11-10 RX ORDER — IBUPROFEN 800 MG/1
800 TABLET ORAL 2 TIMES DAILY PRN
Qty: 25 TABLET | Refills: 1 | Status: SHIPPED | OUTPATIENT
Start: 2022-11-10

## 2022-11-10 RX ADMIN — IBUPROFEN 800 MG: 800 TABLET, FILM COATED ORAL at 09:35

## 2022-11-10 ASSESSMENT — ENCOUNTER SYMPTOMS
BACK PAIN: 1
ABDOMINAL DISTENTION: 0
ABDOMINAL PAIN: 0
FACIAL SWELLING: 0
CHEST TIGHTNESS: 0
EYE PAIN: 0
SHORTNESS OF BREATH: 0
EYE DISCHARGE: 0

## 2022-11-10 ASSESSMENT — PAIN DESCRIPTION - LOCATION: LOCATION: BACK

## 2022-11-10 ASSESSMENT — PAIN DESCRIPTION - ORIENTATION: ORIENTATION: LOWER

## 2022-11-10 ASSESSMENT — PAIN DESCRIPTION - FREQUENCY: FREQUENCY: CONTINUOUS

## 2022-11-10 ASSESSMENT — PAIN - FUNCTIONAL ASSESSMENT: PAIN_FUNCTIONAL_ASSESSMENT: 0-10

## 2022-11-10 ASSESSMENT — PAIN DESCRIPTION - DESCRIPTORS: DESCRIPTORS: SPASM;SHARP

## 2022-11-10 NOTE — ED PROVIDER NOTES
EMERGENCY DEPARTMENT ENCOUNTER    Pt Name: Judi Caro  MRN: [id number]  [place] 1980  Date of evaluation: 11/10/22  CHIEF COMPLAINT       Chief Complaint   Patient presents with    Back Pain     Muscle spasms lower back for 2 days     HISTORY OF PRESENT ILLNESS   HPI  The patient is a 15-year-old female who presented to the emergency department secondary to muscle spasm. Patient denied any trauma or injury but noted spasm in her back intermittently yesterday 2 days ago. She rates the pain 3 out of 10 on the pain scale. Patient denies loss of bladder or bowel urinary retention. Patient [place] chest pain, shortness of breath, nausea, vomiting, fevers or chills    REVIEW OF SYSTEMS     Review of Systems   Constitutional:  Negative for chills, diaphoresis and fever. HENT:  Negative for congestion, ear pain and facial swelling. Eyes:  Negative for pain, discharge and visual disturbance. Respiratory:  Negative for chest tightness and shortness of breath. Cardiovascular:  Negative for chest pain and palpitations. Gastrointestinal:  Negative for abdominal distention and abdominal pain. Genitourinary:  Negative for difficulty urinating and flank pain. Musculoskeletal:  Positive for back pain. Skin:  Negative for wound. Neurological:  Negative for dizziness, light-headedness and headaches.    PASTMEDICAL HISTORY     Past Medical History:   Diagnosis Date    Asthma     history    Bipolar affect, depressed ([address]     ON MEDS    Environmental allergies     GERD (gastroesophageal reflux disease)     Lumbar pain     PVD (peripheral vascular disease) ([facility]      SURGICAL HISTORY       Past Surgical History:   Procedure Laterality Date    DENTAL SURGERY  07/17/2013    extraction   x  2  teeth    DILATION AND CURETTAGE OF UTERUS  2010    WISDOM TOOTH EXTRACTION       CURRENT MEDICATIONS       Previous Medications    ACETAMINOPHEN (APAP EXTRA STRENGTH) 500 MG TABLET    Take 2 tablets by mouth every 8 hours as needed for Pain    ALBUTEROL (PROVENTIL) (2.5 MG/3ML) 0.083% NEBULIZER SOLUTION    Take 3 mLs by nebulization every 6 hours as needed for Wheezing    ALBUTEROL SULFATE HFA (PROVENTIL;VENTOLIN;PROAIR) 108 (90 BASE) MCG/ACT INHALER    Inhale 2 puffs into the lungs every 6 hours as needed for Wheezing or Shortness of Breath Indications: Wheezing    BENZOCAINE-MENTHOL (CEPACOL) 6-10 MG LOZG LOZENGE    Take 1 lozenge by mouth every 2 hours as needed for Sore Throat    BENZONATATE (TESSALON PERLES) 100 MG CAPSULE    Take 1 capsule by mouth every 8 hours as needed for Cough    CITALOPRAM (CELEXA) 40 MG TABLET    Take 40 mg by mouth every morning. DICLOFENAC SODIUM (VOLTAREN) 1 % GEL    Apply 4 g topically 4 times daily    ESCITALOPRAM OXALATE (LEXAPRO PO)    Take by mouth    LAMOTRIGINE (LAMICTAL) 25 MG TABLET    Take 25 mg by mouth daily    ONDANSETRON (ZOFRAN ODT) 4 MG DISINTEGRATING TABLET    Take 1 tablet by mouth every 8 hours as needed for Nausea    ONDANSETRON (ZOFRAN) 4 MG/2ML INJECTION    Infuse 2 mLs intravenously once as needed for Nausea for 1 dose. TRAZODONE (DESYREL) 50 MG TABLET    Take 50 mg by mouth nightly. ALLERGIES     is allergic to latex, ampicillin, bee venom, codeine, morphine, pcn [penicillins], and adhesive tape. FAMILY HISTORY     She indicated that her mother is . She indicated that her father is . SOCIAL HISTORY       Social History     Tobacco Use    Smoking status: Never    Smokeless tobacco: Never   Vaping Use    Vaping Use: Never used   Substance Use Topics    Alcohol use: No    Drug use: No     PHYSICAL EXAM     INITIAL VITALS: BP (!) 123/92   Pulse 74   Temp 97.5 °F (36.4 °C) (Oral)   Resp 16   Wt 227 lb (103 kg)   LMP 10/20/2022   SpO2 97%   BMI 41.51 kg/m²    Physical Exam  Vitals and nursing note reviewed. Constitutional:       General: She is not in acute distress. Appearance: She is well-developed. She is not diaphoretic.    HENT: and formal ultrasound images (except ED bedside ultrasound) are read by the radiologist, see reports below, unless otherwisenoted in MDM or here. No orders to display     LABS: All lab results were reviewed by myself, and all abnormals are listed below. Labs Reviewed - No data to display    EMERGENCY DEPARTMENTCOURSE:         Vitals:    Vitals:    11/10/22 0849   BP: (!) 123/92   Pulse: 74   Resp: 16   Temp: 97.5 °F (36.4 °C)   TempSrc: Oral   SpO2: 97%   Weight: 227 lb (103 kg)       The patient was given the following medications while in the emergency department:  Orders Placed This Encounter   Medications    ibuprofen (ADVIL;MOTRIN) tablet 800 mg    ibuprofen (ADVIL;MOTRIN) 800 MG tablet     Sig: Take 1 tablet by mouth 2 times daily as needed for Pain     Dispense:  25 tablet     Refill:  1    cyclobenzaprine (FLEXERIL) 10 MG tablet     Sig: Take 1 tablet by mouth nightly as needed for Muscle spasms     Dispense:  15 tablet     Refill:  0     CONSULTS:  None    FINAL IMPRESSION      1. Spasm of muscle          DISPOSITION/PLAN   DISPOSITION Decision To Discharge 11/10/2022 09:55:01 AM      PATIENT REFERRED TO:  Lj Paiz MD  [address] [place] [address]  [phone]        DISCHARGE MEDICATIONS:  New Prescriptions    CYCLOBENZAPRINE (FLEXERIL) 10 MG TABLET    Take 1 tablet by mouth nightly as needed for Muscle spasms    IBUPROFEN (ADVIL;MOTRIN) 800 MG TABLET    Take 1 tablet by mouth 2 times daily as needed for Pain     Disha Wilkerson MD  Attending Emergency Physician      The care is provided during an unprecedented national emergency due to the novel coronavirus, COVID 19. This note was created with the assistance of a speech-recognition program. While intending to generate a document that actually reflects the content of the visit, no guarantees can be provided that every mistake has been identified and corrected by editing.     Kirill Bhatia MD  71/32/98 [id number]

## 2022-12-02 ENCOUNTER — HOSPITAL ENCOUNTER (OUTPATIENT)
Age: 42
Setting detail: SPECIMEN
Discharge: HOME | End: 2022-12-02

## 2022-12-02 ENCOUNTER — OFFICE VISIT (OUTPATIENT)
Dept: FAMILY MEDICINE CLINIC | Age: 42
End: 2022-12-02
Payer: COMMERCIAL

## 2022-12-02 VITALS
WEIGHT: 270.6 LBS | HEART RATE: 92 BPM | BODY MASS INDEX: 49.48 KG/M2 | SYSTOLIC BLOOD PRESSURE: 134 MMHG | DIASTOLIC BLOOD PRESSURE: 94 MMHG

## 2022-12-02 DIAGNOSIS — R73.03 PREDIABETES: ICD-10-CM

## 2022-12-02 DIAGNOSIS — Z00.00 ROUTINE HEALTH MAINTENANCE: ICD-10-CM

## 2022-12-02 DIAGNOSIS — H92.02 LEFT EAR PAIN: Primary | ICD-10-CM

## 2022-12-02 LAB
CHOLESTEROL/HDL RATIO: 3.7
CHOLESTEROL: 157 MG/DL
ESTIMATED AVERAGE GLUCOSE: 120 MG/DL
HBA1C MFR BLD: 5.8 % (ref 4–6)
HDLC SERPL-MCNC: 42 MG/DL
HEPATITIS C ANTIBODY: NONREACTIVE
HIV AG/AB: NONREACTIVE
LDL CHOLESTEROL: 88 MG/DL (ref 0–130)
TRIGL SERPL-MCNC: 136 MG/DL

## 2022-12-02 PROCEDURE — 99212 OFFICE O/P EST SF 10 MIN: CPT | Performed by: STUDENT IN AN ORGANIZED HEALTH CARE EDUCATION/TRAINING PROGRAM

## 2022-12-02 PROCEDURE — G8417 CALC BMI ABV UP PARAM F/U: HCPCS | Performed by: STUDENT IN AN ORGANIZED HEALTH CARE EDUCATION/TRAINING PROGRAM

## 2022-12-02 PROCEDURE — 1036F TOBACCO NON-USER: CPT | Performed by: STUDENT IN AN ORGANIZED HEALTH CARE EDUCATION/TRAINING PROGRAM

## 2022-12-02 PROCEDURE — G8482 FLU IMMUNIZE ORDER/ADMIN: HCPCS | Performed by: STUDENT IN AN ORGANIZED HEALTH CARE EDUCATION/TRAINING PROGRAM

## 2022-12-02 PROCEDURE — G8427 DOCREV CUR MEDS BY ELIG CLIN: HCPCS | Performed by: STUDENT IN AN ORGANIZED HEALTH CARE EDUCATION/TRAINING PROGRAM

## 2022-12-02 RX ORDER — IBUPROFEN 400 MG/1
400 TABLET ORAL EVERY 6 HOURS PRN
Qty: 20 TABLET | Refills: 0 | Status: SHIPPED | OUTPATIENT
Start: 2022-12-02 | End: 2022-12-07

## 2022-12-02 SDOH — ECONOMIC STABILITY: FOOD INSECURITY: WITHIN THE PAST 12 MONTHS, YOU WORRIED THAT YOUR FOOD WOULD RUN OUT BEFORE YOU GOT MONEY TO BUY MORE.: NEVER TRUE

## 2022-12-02 SDOH — ECONOMIC STABILITY: FOOD INSECURITY: WITHIN THE PAST 12 MONTHS, THE FOOD YOU BOUGHT JUST DIDN'T LAST AND YOU DIDN'T HAVE MONEY TO GET MORE.: NEVER TRUE

## 2022-12-02 ASSESSMENT — PATIENT HEALTH QUESTIONNAIRE - PHQ9
SUM OF ALL RESPONSES TO PHQ QUESTIONS 1-9: 0
7. TROUBLE CONCENTRATING ON THINGS, SUCH AS READING THE NEWSPAPER OR WATCHING TELEVISION: 0
SUM OF ALL RESPONSES TO PHQ QUESTIONS 1-9: 0
10. IF YOU CHECKED OFF ANY PROBLEMS, HOW DIFFICULT HAVE THESE PROBLEMS MADE IT FOR YOU TO DO YOUR WORK, TAKE CARE OF THINGS AT HOME, OR GET ALONG WITH OTHER PEOPLE: 0
5. POOR APPETITE OR OVEREATING: 0
SUM OF ALL RESPONSES TO PHQ QUESTIONS 1-9: 0
3. TROUBLE FALLING OR STAYING ASLEEP: 0
9. THOUGHTS THAT YOU WOULD BE BETTER OFF DEAD, OR OF HURTING YOURSELF: 0
6. FEELING BAD ABOUT YOURSELF - OR THAT YOU ARE A FAILURE OR HAVE LET YOURSELF OR YOUR FAMILY DOWN: 0
1. LITTLE INTEREST OR PLEASURE IN DOING THINGS: 0
8. MOVING OR SPEAKING SO SLOWLY THAT OTHER PEOPLE COULD HAVE NOTICED. OR THE OPPOSITE, BEING SO FIGETY OR RESTLESS THAT YOU HAVE BEEN MOVING AROUND A LOT MORE THAN USUAL: 0
2. FEELING DOWN, DEPRESSED OR HOPELESS: 0
SUM OF ALL RESPONSES TO PHQ9 QUESTIONS 1 & 2: 0
SUM OF ALL RESPONSES TO PHQ QUESTIONS 1-9: 0
4. FEELING TIRED OR HAVING LITTLE ENERGY: 0

## 2022-12-02 ASSESSMENT — SOCIAL DETERMINANTS OF HEALTH (SDOH): HOW HARD IS IT FOR YOU TO PAY FOR THE VERY BASICS LIKE FOOD, HOUSING, MEDICAL CARE, AND HEATING?: NOT HARD AT ALL

## 2022-12-02 NOTE — PROGRESS NOTES
Attending Physician Statement  I have discussed the care of Adam Reyna 38 y.o. female, including pertinent history and exam findings, with the resident Dr. Bucky Gtz MD.    History and Exam:   Chief Complaint   Patient presents with    Otalgia     Possible  left ear infection, pain for a couple days       Past Medical History:   Diagnosis Date    Asthma     history    Bipolar affect, depressed ([address]     ON MEDS    Environmental allergies     GERD (gastroesophageal reflux disease)     Lumbar pain     PVD (peripheral vascular disease) ([facility])      Allergies   Allergen Reactions    Latex     Ampicillin     Bee Venom     Codeine Hives    Morphine     Pcn [Penicillins]     Adhesive Tape Rash      I have seen and examined the patient and the key elements of the encounter have been performed by me. BP Readings from Last 3 Encounters:   12/02/22 (!) 134/94   11/10/22 (!) 123/92   10/20/22 122/88     BP (!) 134/94   Pulse 92   Wt 270 lb 9.6 oz (122.7 kg)   BMI 49.48 kg/m²   Lab Results   Component Value Date    WBC 14.5 (H) 09/26/2022    HGB 13.4 09/26/2022    HCT 42.8 09/26/2022     09/26/2022    ALT 43 (H) 09/26/2022    AST 28 09/26/2022     09/26/2022    K 3.6 (L) 09/26/2022     09/26/2022    CREATININE 0.64 09/26/2022    BUN 15 09/26/2022    CO2 23 09/26/2022     Lab Results   Component Value Date    LABALBU 4.3 09/26/2022     No results found for: IRON, TIBC, FERRITIN  No results found for: LDLCALC, LDLCHOLESTEROL, LDLDIRECT  I agree with the assessment, plan and the diagnosis of    Diagnosis Orders   1. Left ear pain         . I agree with orders as documented by the resident. More than 25 minutes spent  in face to face encounter with the patient and more than half in counseling. Patient's questions were answered. Patient Voiced understanding to the counseling. Return if symptoms worsen or fail to improve, for Call in 3 days if no improvement.    (GC Modifier)-Dr. Brigette Colmenares MD

## 2022-12-02 NOTE — PROGRESS NOTES
Visit Information    Have you changed or started any medications since your last visit including any over-the-counter medicines, vitamins, or herbal medicines? no   Are you having any side effects from any of your medications? -  no  Have you stopped taking any of your medications? Is so, why? -  no    Have you seen any other physician or provider since your last visit? No  Have you had any other diagnostic tests since your last visit? No  Have you been seen in the emergency room and/or had an admission to a hospital since we last saw you? No  Have you had your routine dental cleaning in the past 6 months? no    Have you activated your [employer] account? If not, what are your barriers?  No:      Patient Care Team:  Braden Andre MD as PCP - General (Family Medicine)    Medical History Review  Past Medical, Family, and Social History reviewed and does not contribute to the patient presenting condition    Health Maintenance   Topic Date Due    Pneumococcal 0-64 years Vaccine (1 - PCV) Never done    HIV screen  Never done    Hepatitis C screen  Never done    DTaP/Tdap/Td vaccine (1 - Tdap) Never done    Cervical cancer screen  Never done    Diabetes screen  Never done    COVID-19 Vaccine (4 - Booster for [place] series) 12/22/2021    Depression Monitoring  10/20/2023    Lipids  11/08/2026    Flu vaccine  Completed    Hepatitis A vaccine  Aged Out    Hib vaccine  Aged Out    Meningococcal (ACWY) vaccine  Aged Out

## 2022-12-02 NOTE — PROGRESS NOTES
[facility] Res[facility] - Outpatient Note      Subjective:      Luis Powell is a 38 y.o. female  presented to the office on 12/02/22 for left ear pain that started 2 days ago. Denies having sinus issues, flulike symptoms in last couple weeks. Complains of pain in left ear without mastoid tenderness, fever or chills, ear discharge, hearing loss or tinnitus. Has not tried anything so far. Review of systems  Positive as mentioned above in subjective. All other systems negative. Objective:      Vitals:    12/02/22 1526   BP: (!) 134/94   Pulse:        General Appearance - Alert and oriented x 3  HEENT-both tympanic membranes look normal without bulge. External ear canal looks normal without erythema or discharge. Assessment:        ICD-10-CM    1. Left ear pain  H92.02           Plan:    No signs of infection at this time. Motrin for pain control. Call back in 3 days if no improvement. Return if symptoms worsen or fail to improve, for Call in 3 days if no improvement. Requested Prescriptions     Signed Prescriptions Disp Refills    ibuprofen (IBU) 400 MG tablet 20 tablet 0     Sig: Take 1 tablet by mouth every 6 hours as needed for Pain       There are no discontinued medications. Discussed use, benefit, and side effects of prescribed medications. Barriers to medication compliance addressed. All patient questions answered. Pt voiced understanding. Disclaimer: Some orall of this note was transcribed using voice-recognition software. This may cause typographical errors occasionally. Although all effort is made to fix these errors, please do not hesitate to contact our office if there Hawthorne Mantle concern with the understanding of this note.     José Antonio Quiles MD  Family Medicine PGY-3  12/02/22 at 4:46 PM

## 2022-12-02 NOTE — PATIENT INSTRUCTIONS
Thank you for letting us take care of you today. We hope all your questions were addressed. If a question was overlooked or something else comes to mind after you return home, please contact a member of your Care Team listed below. Your Care Team at [place] [address] is Team #3  Rodríguez Lozano MD (Faculty)  Mohsen White MD (Faculty  Karleyheavenly Srteet MD (Resident)  Ernestina Valencia (Resident)   Kvng Catherine MD (Resident)  Dong Velasquez., YAJAIRA Whipple., YAJAIRA Nichole., ANIKET Grant., Basilia cEhevarria., Iman Barros ([facility])  Jenaro [facility], [address] (Clinical Practice Manager)  Abdirizak Murphy [facility] (Clinical Pharmacist)     Office phone number: [phone]    If you need to get in right away due to illness, please be advised we have \"Same Day\" appointments available Monday-Friday. Please call us at [phone] option #3 to schedule your \"Same Day\" appointment.

## 2022-12-12 ENCOUNTER — TELEPHONE (OUTPATIENT)
Dept: FAMILY MEDICINE CLINIC | Age: 42
End: 2022-12-12

## 2022-12-19 ENCOUNTER — TELEPHONE (OUTPATIENT)
Dept: FAMILY MEDICINE CLINIC | Age: 42
End: 2022-12-19

## 2022-12-19 NOTE — TELEPHONE ENCOUNTER
Patient contacting office because she thinks she has an URI for about 1 week now and she would like something called into the pharmacy please advise.

## 2023-01-25 ENCOUNTER — HOSPITAL ENCOUNTER (EMERGENCY)
Age: 43
Discharge: HOME | End: 2023-01-25
Attending: EMERGENCY MEDICINE
Payer: COMMERCIAL

## 2023-01-25 ENCOUNTER — APPOINTMENT (OUTPATIENT)
Dept: GENERAL RADIOLOGY | Age: 43
End: 2023-01-25
Payer: COMMERCIAL

## 2023-01-25 VITALS
WEIGHT: 275 LBS | RESPIRATION RATE: 16 BRPM | SYSTOLIC BLOOD PRESSURE: 139 MMHG | HEIGHT: 62 IN | TEMPERATURE: 97.4 F | HEART RATE: 85 BPM | DIASTOLIC BLOOD PRESSURE: 93 MMHG | BODY MASS INDEX: 50.61 KG/M2 | OXYGEN SATURATION: 97 %

## 2023-01-25 DIAGNOSIS — M25.512 ACUTE PAIN OF LEFT SHOULDER: Primary | ICD-10-CM

## 2023-01-25 LAB
ABSOLUTE EOS #: 0.17 K/UL (ref 0–0.44)
ABSOLUTE IMMATURE GRANULOCYTE: 0.03 K/UL (ref 0–0.3)
ABSOLUTE LYMPH #: 3.33 K/UL (ref 1.1–3.7)
ABSOLUTE MONO #: 1.23 K/UL (ref 0.1–1.2)
ANION GAP SERPL CALCULATED.3IONS-SCNC: 9 MMOL/L (ref 9–17)
BASOPHILS # BLD: 1 % (ref 0–2)
BASOPHILS ABSOLUTE: 0.11 K/UL (ref 0–0.2)
BUN BLDV-MCNC: 11 MG/DL (ref 6–20)
CALCIUM SERPL-MCNC: 8.9 MG/DL (ref 8.6–10.4)
CHLORIDE BLD-SCNC: 103 MMOL/L (ref 98–107)
CO2: 26 MMOL/L (ref 20–31)
CREAT SERPL-MCNC: 0.56 MG/DL (ref 0.5–0.9)
EOSINOPHILS RELATIVE PERCENT: 1 % (ref 1–4)
GFR SERPL CREATININE-BSD FRML MDRD: >60 ML/MIN/1.73M2
GLUCOSE BLD-MCNC: 109 MG/DL (ref 70–99)
HCT VFR BLD CALC: 48 % (ref 36.3–47.1)
HEMOGLOBIN: 14.9 G/DL (ref 11.9–15.1)
IMMATURE GRANULOCYTES: 0 %
LYMPHOCYTES # BLD: 26 % (ref 24–43)
MCH RBC QN AUTO: 28.5 PG (ref 25.2–33.5)
MCHC RBC AUTO-ENTMCNC: 31 G/DL (ref 28.4–34.8)
MCV RBC AUTO: 91.8 FL (ref 82.6–102.9)
MONOCYTES # BLD: 10 % (ref 3–12)
NRBC AUTOMATED: 0 PER 100 WBC
PDW BLD-RTO: 13.2 % (ref 11.8–14.4)
PLATELET # BLD: 466 K/UL (ref 138–453)
PMV BLD AUTO: 9.4 FL (ref 8.1–13.5)
POTASSIUM SERPL-SCNC: 3.7 MMOL/L (ref 3.7–5.3)
RBC # BLD: 5.23 M/UL (ref 3.95–5.11)
SEG NEUTROPHILS: 62 % (ref 36–65)
SEGMENTED NEUTROPHILS ABSOLUTE COUNT: 8.11 K/UL (ref 1.5–8.1)
SODIUM BLD-SCNC: 138 MMOL/L (ref 135–144)
TROPONIN, HIGH SENSITIVITY: <6 NG/L (ref 0–14)
WBC # BLD: 13 K/UL (ref 3.5–11.3)

## 2023-01-25 PROCEDURE — 6370000000 HC RX 637 (ALT 250 FOR IP): Performed by: STUDENT IN AN ORGANIZED HEALTH CARE EDUCATION/TRAINING PROGRAM

## 2023-01-25 PROCEDURE — 84484 ASSAY OF TROPONIN QUANT: CPT

## 2023-01-25 PROCEDURE — 96374 THER/PROPH/DIAG INJ IV PUSH: CPT

## 2023-01-25 PROCEDURE — 85025 COMPLETE CBC W/AUTO DIFF WBC: CPT

## 2023-01-25 PROCEDURE — 93005 ELECTROCARDIOGRAM TRACING: CPT | Performed by: STUDENT IN AN ORGANIZED HEALTH CARE EDUCATION/TRAINING PROGRAM

## 2023-01-25 PROCEDURE — 99285 EMERGENCY DEPT VISIT HI MDM: CPT

## 2023-01-25 PROCEDURE — 71045 X-RAY EXAM CHEST 1 VIEW: CPT

## 2023-01-25 PROCEDURE — 80048 BASIC METABOLIC PNL TOTAL CA: CPT

## 2023-01-25 PROCEDURE — 6360000002 HC RX W HCPCS: Performed by: STUDENT IN AN ORGANIZED HEALTH CARE EDUCATION/TRAINING PROGRAM

## 2023-01-25 RX ORDER — METHOCARBAMOL 500 MG/1
1000 TABLET, FILM COATED ORAL ONCE
Status: COMPLETED | OUTPATIENT
Start: 2023-01-25 | End: 2023-01-25

## 2023-01-25 RX ORDER — ACETAMINOPHEN 500 MG
1000 TABLET ORAL ONCE
Status: COMPLETED | OUTPATIENT
Start: 2023-01-25 | End: 2023-01-25

## 2023-01-25 RX ORDER — ACETAMINOPHEN 500 MG
1000 TABLET ORAL 3 TIMES DAILY
Qty: 30 TABLET | Refills: 0 | Status: SHIPPED | OUTPATIENT
Start: 2023-01-25 | End: 2023-01-30

## 2023-01-25 RX ORDER — IBUPROFEN 600 MG/1
600 TABLET ORAL 4 TIMES DAILY PRN
Qty: 20 TABLET | Refills: 0 | Status: SHIPPED | OUTPATIENT
Start: 2023-01-25 | End: 2023-01-30

## 2023-01-25 RX ORDER — METHOCARBAMOL 500 MG/1
500 TABLET, FILM COATED ORAL 4 TIMES DAILY
Qty: 20 TABLET | Refills: 0 | Status: SHIPPED | OUTPATIENT
Start: 2023-01-25 | End: 2023-01-30

## 2023-01-25 RX ORDER — KETOROLAC TROMETHAMINE 15 MG/ML
15 INJECTION, SOLUTION INTRAMUSCULAR; INTRAVENOUS ONCE
Status: COMPLETED | OUTPATIENT
Start: 2023-01-25 | End: 2023-01-25

## 2023-01-25 RX ADMIN — METHOCARBAMOL 1000 MG: 500 TABLET ORAL at 21:57

## 2023-01-25 RX ADMIN — KETOROLAC TROMETHAMINE 15 MG: 15 INJECTION, SOLUTION INTRAMUSCULAR; INTRAVENOUS at 21:57

## 2023-01-25 RX ADMIN — ACETAMINOPHEN 1000 MG: 500 TABLET ORAL at 21:12

## 2023-01-25 ASSESSMENT — PAIN DESCRIPTION - FREQUENCY: FREQUENCY: CONTINUOUS

## 2023-01-25 ASSESSMENT — PAIN SCALES - GENERAL
PAINLEVEL_OUTOF10: 9
PAINLEVEL_OUTOF10: 10

## 2023-01-25 ASSESSMENT — ENCOUNTER SYMPTOMS
NAUSEA: 0
VOMITING: 0
SHORTNESS OF BREATH: 0
COLOR CHANGE: 0
DIARRHEA: 0
ABDOMINAL PAIN: 0
BACK PAIN: 0

## 2023-01-25 ASSESSMENT — PAIN DESCRIPTION - LOCATION
LOCATION: SHOULDER
LOCATION: ARM;SHOULDER

## 2023-01-25 ASSESSMENT — PAIN DESCRIPTION - PAIN TYPE: TYPE: ACUTE PAIN

## 2023-01-25 ASSESSMENT — PAIN - FUNCTIONAL ASSESSMENT: PAIN_FUNCTIONAL_ASSESSMENT: 0-10

## 2023-01-25 ASSESSMENT — PAIN DESCRIPTION - DESCRIPTORS: DESCRIPTORS: DISCOMFORT

## 2023-01-25 ASSESSMENT — PAIN DESCRIPTION - ORIENTATION
ORIENTATION: LEFT
ORIENTATION: LEFT

## 2023-01-25 ASSESSMENT — HEART SCORE: ECG: 0

## 2023-01-26 LAB
EKG ATRIAL RATE: 86 BPM
EKG P AXIS: 38 DEGREES
EKG P-R INTERVAL: 156 MS
EKG Q-T INTERVAL: 390 MS
EKG QRS DURATION: 84 MS
EKG QTC CALCULATION (BAZETT): 466 MS
EKG R AXIS: 25 DEGREES
EKG T AXIS: 36 DEGREES
EKG VENTRICULAR RATE: 86 BPM

## 2023-01-26 PROCEDURE — 93010 ELECTROCARDIOGRAM REPORT: CPT | Performed by: INTERNAL MEDICINE

## 2023-01-26 NOTE — ED NOTES
The following labs were obtained, labeled with appropriate pt sticker and tubed to lab: by Penelope Paniagua RN    [x] Blue     [x] Lavender   [] on ice  [x] Green/yellow  [x] Green/black [] on ice  [] Meghan Hartman  [] on ice  [] Yellow  [] Red  [] Type/ Screen  [] ABG  [] VBG    [] COVID-19 swab    [] Rapid  [] PCR  [] Flu swab  [] Peds Viral Panel     [] Urine Sample  [] Fecal Sample  [] Pelvic Cultures  [] Blood Cultures  [] X 2  [] STREP Cultures         Petra Coyne LPN  24/25/49 [id number]

## 2023-01-26 NOTE — ED TRIAGE NOTES
39 yo female arrived to ED with c/o of a left shoulder pain that starts at the elbow and radiates. That has been on going for the past few days. Pt denies any falls and/or injuries. Pt denies any chest pain and/or SOB. Pt A&O x 4, does not appear in acute distress, RR even and unlabored, resting comfortably on stretcher with eyes open and call light in reach. Vital signs obtained, medical hx and allergies reviewed with pt. Initial assessment performed by physician, Kalin Vargas will carry out initial orders/tasks and reassess pt.

## 2023-01-26 NOTE — ED PROVIDER NOTES
[facility] ED  Emergency Department Encounter  Emergency Medicine Resident     Pt Diamond Jaquez  MRN: [id number]  [place] 1980  Date of evaluation: 1/25/23  PCP:  Jennyfer Ramirez MD  Note Started: 8:22 PM EST      CHIEF COMPLAINT       Chief Complaint   Patient presents with    Shoulder Pain     Left shoulder. Denies falls/injuries. HISTORY OF PRESENT ILLNESS  (Location/Symptom, Timing/Onset, Context/Setting, Quality, Duration, Modifying Factors, Severity.)      Cristian Fernandez is a 38 y.o. female who presents with complaints of a left shoulder pain that starts at the elbow and radiates. Is been going on for the past few days. Past medical history significant for peripheral vascular disease as well as GERD bipolar disorder and asthma. Patient states that this been going on for the past few days. Denies any trauma to the region. Denies any chest pain or shortness of breath. States they pain is worse on exertion as well as picking up something with her left arm. Describes it as sharp pulling pain. Denies any swelling of the arm, numbness or tingling in the hands. Denies any diaphoresis or nausea. Denies any history of heart attack, blood clots or stroke. PAST MEDICAL / SURGICAL / SOCIAL / FAMILY HISTORY      has a past medical history of Asthma, Bipolar affect, depressed ([address] Environmental allergies, GERD (gastroesophageal reflux disease), Lumbar pain, and PVD (peripheral vascular disease) ([facility] [address] has a past surgical history that includes Dilation and curettage of uterus (2010); [place] tooth extraction; and Dental surgery (07/17/2013).       Social History     Socioeconomic History    Marital status: Single     Spouse name: Not on file    Number of children: Not on file    Years of education: Not on file    Highest education level: Not on file   Occupational History    Not on file   Tobacco Use    Smoking status: Never    Smokeless tobacco: Never   Vaping Use Vaping Use: Never used   Substance and Sexual Activity    Alcohol use: No    Drug use: No    Sexual activity: Yes     Partners: Male   Other Topics Concern    Not on file   Social History Narrative    Not on file     Social Determinants of Health     Financial Resource Strain: Low Risk     Difficulty of Paying Living Expenses: Not hard at all   Food Insecurity: No Food Insecurity    Worried About Running Out of Food in the Last Year: Never true    Ran Out of Food in the Last Year: Never true   Transportation Needs: Not on file   Physical Activity: Not on file   Stress: Not on file   Social Connections: Not on file   Intimate Partner Violence: Not on file   Housing Stability: Not on file       Family History   Problem Relation Age of Onset    Heart Attack Mother     No Known Problems Father        Allergies:  Latex, Ampicillin, Bee venom, Codeine, Morphine, Pcn [penicillins], and Adhesive tape    Home Medications:  Prior to Admission medications    Medication Sig Start Date End Date Taking?  Authorizing Provider   acetaminophen (TYLENOL) 500 MG tablet Take 2 tablets by mouth 3 times daily for 5 days 1/25/23 1/30/23 Yes Darroll Glad, DO   ibuprofen (ADVIL;MOTRIN) 600 MG tablet Take 1 tablet by mouth 4 times daily as needed for Pain 1/25/23 1/30/23 Yes Darroll Glad, DO   methocarbamol (ROBAXIN) 500 MG tablet Take 1 tablet by mouth 4 times daily for 5 days 1/25/23 1/30/23 Yes Darroll Glad, DO   lamoTRIgine (LAMICTAL) 25 MG tablet Take 25 mg by mouth daily    Historical Provider, MD   albuterol sulfate HFA (PROVENTIL;VENTOLIN;PROAIR) 108 (90 Base) MCG/ACT inhaler Inhale 2 puffs into the lungs every 6 hours as needed for Wheezing or Shortness of Breath Indications: Wheezing 10/20/22   Tin Pandya MD   diclofenac sodium (VOLTAREN) 1 % GEL Apply 4 g topically 4 times daily 10/20/22   Tin Pandya MD   ondansetron (ZOFRAN ODT) 4 MG disintegrating tablet Take 1 tablet by mouth every 8 hours as needed for Nausea 9/26/22   Vee Ovalle, DO   Escitalopram Oxalate (LEXAPRO PO) Take by mouth    Historical Provider, MD   benzonatate (TESSALON PERLES) 100 MG capsule Take 1 capsule by mouth every 8 hours as needed for Cough  Patient not taking: No sig reported 3/13/22   Richra Hawthorne MD   Benzocaine-Menthol (CEPACOL) 6-10 MG LOZG lozenge Take 1 lozenge by mouth every 2 hours as needed for Sore Throat  Patient not taking: No sig reported 10/24/21   Blanquita Giraldo DO   albuterol (PROVENTIL) (2.5 MG/3ML) 0.083% nebulizer solution Take 3 mLs by nebulization every 6 hours as needed for Wheezing 5/21/21   Gretel Posey MD   ondansetron [facility]) 4 MG/2ML injection Infuse 2 mLs intravenously once as needed for Nausea for 1 dose. Patient not taking: Reported on 12/2/2022 7/17/13   Daxa Noonan DDS   citalopram (CELEXA) 40 MG tablet Take 40 mg by mouth every morning. Historical Provider, MD   traZODone (DESYREL) 50 MG tablet Take 50 mg by mouth nightly    Historical Provider, MD         REVIEW OF SYSTEMS       Review of Systems   Constitutional:  Negative for chills and fever. Respiratory:  Negative for shortness of breath. Cardiovascular:  Negative for chest pain. Gastrointestinal:  Negative for abdominal pain, diarrhea, nausea and vomiting. Musculoskeletal:  Negative for back pain and neck pain. Left arm pain   Skin:  Negative for color change, pallor, rash and wound. Neurological:  Negative for weakness and headaches. PHYSICAL EXAM      INITIAL VITALS:   BP (!) 139/93   Pulse 85   Temp 97.4 °F (36.3 °C) (Oral)   Resp 16   Ht 5' 2\" (1.575 m)   Wt 275 lb (124.7 kg)   SpO2 97%   BMI 50.30 kg/m²     Physical Exam  Constitutional:       General: She is not in acute distress. Appearance: She is not ill-appearing, toxic-appearing or diaphoretic. HENT:      Head: Normocephalic and atraumatic. Eyes:      Extraocular Movements: Extraocular movements intact.    Cardiovascular:      Rate and Rhythm: Normal rate and regular rhythm. Pulses: Normal pulses. Heart sounds: No murmur heard. No friction rub. No gallop. Comments: 2+ peripheral pulses  Pulmonary:      Effort: No respiratory distress. Breath sounds: No stridor. No wheezing, rhonchi or rales. Chest:      Chest wall: No tenderness. Abdominal:      General: There is no distension. Palpations: There is no mass. Tenderness: There is no abdominal tenderness. There is no guarding or rebound. Hernia: No hernia is present. Musculoskeletal:         General: No swelling, tenderness, deformity or signs of injury. Right lower leg: No edema. Left lower leg: No edema. Comments: Minimal tenderness on palpation of the left arm   Skin:     Capillary Refill: Capillary refill takes less than 2 seconds. Coloration: Skin is not jaundiced or pale. Findings: No bruising, erythema, lesion or rash. Neurological:      Mental Status: She is oriented to person, place, and time. DDX/DIAGNOSTIC RESULTS / EMERGENCY DEPARTMENT COURSE / MDM     Medical Decision Making  43-year-old female presenting with left shoulder/arm pain. States is worse with carrying a heavy object. On exam minimal pain elicited. Good 2+ peripheral pulses. Have a low suspicion for ACS however patient's history is extremely vague and she is a poor historian will obtain chest x-ray and basic cardiac work-up. Do not think this is a pulmonary embolism there is no swelling of the arm. No trauma. Differential diagnosis of muscular strain/sprain, ACS, pneumonia, radiculopathy    Amount and/or Complexity of Data Reviewed  External Data Reviewed: notes. Labs: ordered. Radiology: ordered. ECG/medicine tests: ordered. Risk  OTC drugs. Prescription drug management. Risk Details: Work-up is unremarkable. I do feel this is likely musculoskeletal in nature however given patient is a poor historian cardiac work-up was initiated.   This was grossly unremarkable. Heart score of 1. This could be radiculopathy versus a rotator cuff issue. I did discuss with patient this and recommend she follows with her primary care provider. Patient otherwise feeling better. Given medications on discharge. Patient amenable discharge voiced understanding return precautions. Critical Care  Total time providing critical care: < 30 minutes      EKG  EKG Interpretation    Interpreted by me    Rhythm: normal sinus   Rate: normal  Axis: normal  Ectopy: none  Conduction: normal  ST Segments: no acute change  T Waves: no acute change  Q Waves: none    Clinical Impression: no acute changes and normal EKG    All EKG's are interpreted by the Emergency Department Physician who either signs or Co-signs this chart in the absence of a cardiologist.    EMERGENCY DEPARTMENT COURSE:      ED Course as of 01/25/23 2207 Wed Jan 25, 2023 2146 Patient reevaluated. First troponin less than 6. Patient is not hypothermic. Patient still having some pain but improved. Will give dose of Toradol and reassess. [MS]   2146 Given that pain has been constant nature with no indication for second troponin at this time. [MS]      ED Course User Index  [MS] Екатерина Mckay DO       PROCEDURES:      CONSULTS:  None    CRITICAL CARE:  There was significant risk of life threatening deterioration of patient's condition requiring my direct management. Critical care time  minutes, excluding any documented procedures. FINAL IMPRESSION      1.  Acute pain of left shoulder          DISPOSITION / PLAN     DISPOSITION Decision To Discharge 01/25/2023 09:57:48 PM      PATIENT REFERRED TO:  Karyn Quiroz MD  [address] [address]  [phone]    Schedule an appointment as soon as possible for a visit       [facility]  [address] [place] [address]  [phone]    If symptoms worsen    DISCHARGE MEDICATIONS:  New Prescriptions    ACETAMINOPHEN (TYLENOL) 500 MG TABLET    Take 2 tablets by mouth 3 times daily for 5 days    IBUPROFEN (ADVIL;MOTRIN) 600 MG TABLET    Take 1 tablet by mouth 4 times daily as needed for Pain    METHOCARBAMOL (ROBAXIN) 500 MG TABLET    Take 1 tablet by mouth 4 times daily for 5 days       Edgardo Medina DO  Emergency Medicine Resident    (Please note that portions of thisnote were completed with a voice recognition program.  Efforts were made to edit the dictations but occasionally words are mis-transcribed.)        Luis Manuel Arias DO  Resident  01/25/23 1198

## 2023-01-26 NOTE — ED NOTES
Medicated for LUE and left shoulder pain, 9/10 by Karen Malhotra after being assessed by Dr. Jc Prince, LPN  56/13/21 [id number]

## 2023-01-26 NOTE — ED PROVIDER NOTES
[address] ED     Emergency Department     Faculty Attestation        I performed a history and physical examination of the patient and discussed management with the resident. I reviewed the residents note and agree with the documented findings and plan of care. Any areas of disagreement are noted on the chart. I was personally present for the key portions of any procedures. I have documented in the chart those procedures where I was not present during the key portions. I have reviewed the emergency nurses triage note. I agree with the chief complaint, past medical history, past surgical history, allergies, medications, social and family history as documented unless otherwise noted below. For mid-level providers such as nurse practitioners as well as physicians assistants:    I have personally seen and evaluated the patient. I find the patient's history and physical exam are consistent with NP/PA documentation. I agree with the care provided, treatment rendered, disposition, & follow-up plan. Additional findings are as noted. Vital Signs: BP (!) 139/93   Pulse 85   Temp (!) 95.7 °F (35.4 °C) (Oral)   Resp 16   Ht 5' 2\" (1.575 m)   Wt 275 lb (124.7 kg)   SpO2 97%   BMI 50.30 kg/m²   PCP:  Nitin Beebe MD    Pertinent Comments:     Mainly complaining left shoulder pain right-hand-dominant worse with movement. No bruising ecchymosis or signs of deformity no chest pain or overt anginal symptoms.       Critical Care  None          Tim Potter MD    Attending Emergency Medicine Physician            Jolly Raya MD  01/25/23 0125

## 2023-01-26 NOTE — DISCHARGE INSTRUCTIONS
This is likely musculoskeletal pain. This could either be a rotator cuff or could be radicular pain given that it starting in the neck. I recommend that you call and schedule appoint with your primary care provider for reevaluation. Take your medications as prescribed. Do not drink alcohol or drive while taking her medications. Return emergency department sooner if you develop any severe worsening of your symptoms, chest pain or shortness of breath, one-sided weakness slurred speech difficulty swallowing or any other general concerns.

## 2023-01-31 ENCOUNTER — TELEPHONE (OUTPATIENT)
Dept: FAMILY MEDICINE CLINIC | Age: 43
End: 2023-01-31

## 2023-01-31 NOTE — TELEPHONE ENCOUNTER
Pt stated when she is walking up the stairs she is having sob and her heart is beating fast. No other symptoms, pt declined feeling this way with walking in general around the house unless going up the stairs, no headache or feeling funny. Pt claudia for appt 2-1-23. Pt unable to come in today pt stated. Has another appt at 1:30pm with [facility].

## 2023-02-01 ENCOUNTER — OFFICE VISIT (OUTPATIENT)
Dept: FAMILY MEDICINE CLINIC | Age: 43
End: 2023-02-01
Payer: COMMERCIAL

## 2023-02-01 VITALS
HEIGHT: 62 IN | OXYGEN SATURATION: 98 % | DIASTOLIC BLOOD PRESSURE: 76 MMHG | HEART RATE: 82 BPM | WEIGHT: 274.8 LBS | BODY MASS INDEX: 50.57 KG/M2 | SYSTOLIC BLOOD PRESSURE: 107 MMHG | TEMPERATURE: 98.1 F

## 2023-02-01 DIAGNOSIS — K21.9 GASTROESOPHAGEAL REFLUX DISEASE, UNSPECIFIED WHETHER ESOPHAGITIS PRESENT: ICD-10-CM

## 2023-02-01 DIAGNOSIS — J45.30 MILD PERSISTENT ASTHMA WITHOUT COMPLICATION: Primary | ICD-10-CM

## 2023-02-01 PROBLEM — Z23 NEED FOR PROPHYLACTIC VACCINATION AGAINST STREPTOCOCCUS PNEUMONIAE (PNEUMOCOCCUS): Status: ACTIVE | Noted: 2023-02-01

## 2023-02-01 PROCEDURE — G8482 FLU IMMUNIZE ORDER/ADMIN: HCPCS | Performed by: STUDENT IN AN ORGANIZED HEALTH CARE EDUCATION/TRAINING PROGRAM

## 2023-02-01 PROCEDURE — 99213 OFFICE O/P EST LOW 20 MIN: CPT | Performed by: STUDENT IN AN ORGANIZED HEALTH CARE EDUCATION/TRAINING PROGRAM

## 2023-02-01 PROCEDURE — G8417 CALC BMI ABV UP PARAM F/U: HCPCS | Performed by: STUDENT IN AN ORGANIZED HEALTH CARE EDUCATION/TRAINING PROGRAM

## 2023-02-01 PROCEDURE — G8427 DOCREV CUR MEDS BY ELIG CLIN: HCPCS | Performed by: STUDENT IN AN ORGANIZED HEALTH CARE EDUCATION/TRAINING PROGRAM

## 2023-02-01 PROCEDURE — 99211 OFF/OP EST MAY X REQ PHY/QHP: CPT | Performed by: FAMILY MEDICINE

## 2023-02-01 PROCEDURE — 1036F TOBACCO NON-USER: CPT | Performed by: STUDENT IN AN ORGANIZED HEALTH CARE EDUCATION/TRAINING PROGRAM

## 2023-02-01 RX ORDER — CETIRIZINE HYDROCHLORIDE, PSEUDOEPHEDRINE HYDROCHLORIDE 5; 120 MG/1; MG/1
1 TABLET, FILM COATED, EXTENDED RELEASE ORAL 2 TIMES DAILY
COMMUNITY
Start: 2022-12-19

## 2023-02-01 RX ORDER — FLUTICASONE PROPIONATE 110 UG/1
1 AEROSOL, METERED RESPIRATORY (INHALATION) 2 TIMES DAILY
Qty: 12 G | Refills: 3 | Status: SHIPPED | OUTPATIENT
Start: 2023-02-01

## 2023-02-01 RX ORDER — FAMOTIDINE 20 MG/1
20 TABLET, FILM COATED ORAL 2 TIMES DAILY
Qty: 60 TABLET | Refills: 3 | Status: SHIPPED | OUTPATIENT
Start: 2023-02-01

## 2023-02-01 RX ORDER — ECHINACEA PURPUREA EXTRACT 125 MG
1 TABLET ORAL PRN
COMMUNITY
Start: 2022-12-19

## 2023-02-01 RX ORDER — LORATADINE 10 MG/1
TABLET ORAL
COMMUNITY
Start: 2023-01-09

## 2023-02-01 RX ORDER — FAMOTIDINE 20 MG/1
20 TABLET, FILM COATED ORAL 2 TIMES DAILY
Qty: 60 TABLET | Refills: 3 | Status: SHIPPED | OUTPATIENT
Start: 2023-02-01 | End: 2023-02-01

## 2023-02-01 RX ORDER — FLUTICASONE PROPIONATE 110 UG/1
1 AEROSOL, METERED RESPIRATORY (INHALATION) 2 TIMES DAILY
Qty: 12 G | Refills: 3 | Status: SHIPPED | OUTPATIENT
Start: 2023-02-01 | End: 2023-02-01

## 2023-02-01 ASSESSMENT — PATIENT HEALTH QUESTIONNAIRE - PHQ9
8. MOVING OR SPEAKING SO SLOWLY THAT OTHER PEOPLE COULD HAVE NOTICED. OR THE OPPOSITE, BEING SO FIGETY OR RESTLESS THAT YOU HAVE BEEN MOVING AROUND A LOT MORE THAN USUAL: 0
3. TROUBLE FALLING OR STAYING ASLEEP: 0
4. FEELING TIRED OR HAVING LITTLE ENERGY: 0
SUM OF ALL RESPONSES TO PHQ9 QUESTIONS 1 & 2: 0
7. TROUBLE CONCENTRATING ON THINGS, SUCH AS READING THE NEWSPAPER OR WATCHING TELEVISION: 0
SUM OF ALL RESPONSES TO PHQ QUESTIONS 1-9: 0
2. FEELING DOWN, DEPRESSED OR HOPELESS: 0
6. FEELING BAD ABOUT YOURSELF - OR THAT YOU ARE A FAILURE OR HAVE LET YOURSELF OR YOUR FAMILY DOWN: 0
10. IF YOU CHECKED OFF ANY PROBLEMS, HOW DIFFICULT HAVE THESE PROBLEMS MADE IT FOR YOU TO DO YOUR WORK, TAKE CARE OF THINGS AT HOME, OR GET ALONG WITH OTHER PEOPLE: 0
9. THOUGHTS THAT YOU WOULD BE BETTER OFF DEAD, OR OF HURTING YOURSELF: 0
1. LITTLE INTEREST OR PLEASURE IN DOING THINGS: 0
5. POOR APPETITE OR OVEREATING: 0
SUM OF ALL RESPONSES TO PHQ QUESTIONS 1-9: 0

## 2023-02-01 ASSESSMENT — ENCOUNTER SYMPTOMS
VOMITING: 0
DIARRHEA: 0
NAUSEA: 0
COUGH: 0
ABDOMINAL PAIN: 0
SHORTNESS OF BREATH: 1
CONSTIPATION: 0

## 2023-02-01 NOTE — PROGRESS NOTES
Visit Information    Have you changed or started any medications since your last visit including any over-the-counter medicines, vitamins, or herbal medicines? no   Have you stopped taking any of your medications? Is so, why? -  yes see list  Are you having any side effects from any of your medications? - no    Have you seen any other physician or provider since your last visit?  no   Have you had any other diagnostic tests since your last visit?  no   Have you been seen in the emergency room and/or had an admission in a hospital since we last saw you?  no   Have you had your routine dental cleaning in the past 6 months?  no     Do you have an active MyChart account? If no, what is the barrier?   No: pending    Patient Care Team:  Felicita Bonner MD as PCP - General (Family Medicine)    Medical History Review  Past Medical, Family, and Social History reviewed and does not contribute to the patient presenting condition    Health Maintenance   Topic Date Due    Pneumococcal 0-64 years Vaccine (1 - PCV) Never done    DTaP/Tdap/Td vaccine (1 - Tdap) Never done    Cervical cancer screen  Never done    COVID-19 Vaccine (4 - Booster for [place] series) 12/22/2021    A1C test (Diabetic or Prediabetic)  12/02/2023    Depression Monitoring  12/02/2023    Lipids  12/02/2027    Flu vaccine  Completed    Hepatitis C screen  Completed    HIV screen  Completed    Hepatitis A vaccine  Aged Out    Hib vaccine  Aged Out    Meningococcal (ACWY) vaccine  Aged Out

## 2023-02-01 NOTE — PATIENT INSTRUCTIONS
Thank you for letting us take care of you today. We hope all your questions were addressed. If a question was overlooked or something else comes to mind after you return home, please contact a member of your Care Team listed below. Your Care Team at [place] [id number] is Team #1  Cecily Simpson MD (Faculty)  Mikie Coker MD(Resident)  Venice Trevino MD (Resident)  Cathy Kelly DO (Resident)  Corrinne Buzzard, YAJAIRA Liaoris Show., YAJAIRA Davalosar Raw., ANIKET Abad., Boom Ferguson., [facility] office)  Kate Luis, [address] (Clinical Practice Manager)  ELOY [facility] (Clinical Pharmacist)     Office phone number: [phone]    If you need to get in right away due to illness, please be advised we have \"Same Day\" appointments available Monday-Friday. Please call us at [phone] option #3 to schedule your \"Same Day\" appointment.

## 2023-02-01 NOTE — PROGRESS NOTES
Attending Physician Statement  I have discussed the care of Marcelo Adams, 38 y.o. female,including pertinent history and exam findings,  with the resident Dr. Jaz Dailey MD.  History:  Chief Complaint   Patient presents with    Shortness of Breath     While walking up the stays      I have reviewed the key elements of the encounter with the resident. Examination was done by resident as documented in residents note. BP Readings from Last 3 Encounters:   02/01/23 107/76   01/25/23 (!) 139/93   12/02/22 (!) 134/94     /76 (Site: Left Upper Arm, Position: Sitting, Cuff Size: Medium Adult)   Pulse 82   Temp 98.1 °F (36.7 °C) (Oral)   Ht 5' 2\" (1.575 m)   Wt 274 lb 12.8 oz (124.6 kg)   LMP 01/20/2023   SpO2 98%   BMI 50.26 kg/m²   Lab Results   Component Value Date    WBC 13.0 (H) 01/25/2023    HGB 14.9 01/25/2023    HCT 48.0 (H) 01/25/2023     (H) 01/25/2023    CHOL 157 12/02/2022    TRIG 136 12/02/2022    HDL 42 12/02/2022    ALT 43 (H) 09/26/2022    AST 28 09/26/2022     01/25/2023    K 3.7 01/25/2023     01/25/2023    CREATININE 0.56 01/25/2023    BUN 11 01/25/2023    CO2 26 01/25/2023    LABA1C 5.8 12/02/2022     Lab Results   Component Value Date    CALCIUM 8.9 01/25/2023     Lab Results   Component Value Date    LDLCHOLESTEROL 88 12/02/2022     I agree with the assessment, plan and diagnosis of    Diagnosis Orders   1. Mild persistent asthma without complication  Full PFT Study With Bronchodilator    fluticasone (FLOVENT HFA) 110 MCG/ACT inhaler    DISCONTINUED: fluticasone (FLOVENT HFA) 110 MCG/ACT inhaler      2. Gastroesophageal reflux disease, unspecified whether esophagitis present  famotidine (PEPCID) 20 MG tablet    DISCONTINUED: famotidine (PEPCID) 20 MG tablet        I agree with  orders as documented by the resident. Recommendations: Agree with A&P. Will consider SMART therapy in the future if allowable by insurance.     Return in about 2 weeks (around 2/15/2023) for follow up asthma.    (Alma Pelayo ) Dr. Frannie Guerra MD

## 2023-02-01 NOTE — PROGRESS NOTES
Subjective:    Surendra Flower is a 38 y.o. female with  has a past medical history of Asthma, Bipolar affect, depressed ([address] Environmental allergies, GERD (gastroesophageal reflux disease), Lumbar pain, and PVD (peripheral vascular disease) ([address] Presented to the office today for:  Chief Complaint   Patient presents with    Shortness of Breath     While walking up the [facility]     Health Maintenance     Due for pap        HPI    40-year-old female with past medical history of bipolar disorder, asthma presents with complaint of shortness of breath. She reports recent onset of SOB when going up stairs, associated with palpitations after walking up 15 stairs. She is on albuterol as needed only. She reports that she has been using her albuterol inhaler more than twice a week and she wakes up at night with shortness of breath more than twice a week. She also repost acid reflux. Reports chest discomfort incident yesterday. No pain, no radiation, no nausea or vomiting. She denies any chest pain today. No family history of heart disease except for heart disease in her grandfather at an old age and her uncle at late 46s. Review of Systems   Constitutional:  Negative for activity change, appetite change, chills, fatigue and fever. HENT: Negative. Respiratory:  Positive for shortness of breath. Negative for cough. Cardiovascular:  Negative for chest pain, palpitations and leg swelling. Gastrointestinal:  Negative for abdominal pain, constipation, diarrhea, nausea and vomiting. Genitourinary: Negative. Negative for decreased urine volume. Neurological:  Negative for dizziness, weakness, light-headedness, numbness and headaches.        The patient has a   Family History   Problem Relation Age of Onset    Heart Attack Mother     No Known Problems Father        Objective:    /76 (Site: Left Upper Arm, Position: Sitting, Cuff Size: Medium Adult)   Pulse 82   Temp 98.1 °F (36.7 °C) (Oral) Ht 5' 2\" (1.575 m)   Wt 274 lb 12.8 oz (124.6 kg)   LMP 01/20/2023   SpO2 98%   BMI 50.26 kg/m²    BP Readings from Last 3 Encounters:   02/01/23 107/76   01/25/23 (!) 139/93   12/02/22 (!) 134/94       Physical Exam  Vitals and nursing note reviewed. Constitutional:       General: She is not in acute distress. Appearance: Normal appearance. She is not ill-appearing. HENT:      Head: Normocephalic and atraumatic. Cardiovascular:      Rate and Rhythm: Normal rate and regular rhythm. Pulses: Normal pulses. Heart sounds: No murmur heard. Pulmonary:      Effort: Pulmonary effort is normal.      Breath sounds: Normal breath sounds. Abdominal:      Palpations: Abdomen is soft. There is no mass. Tenderness: There is no abdominal tenderness. Neurological:      General: No focal deficit present. Mental Status: She is alert and oriented to person, place, and time. Lab Results   Component Value Date    WBC 13.0 (H) 01/25/2023    HGB 14.9 01/25/2023    HCT 48.0 (H) 01/25/2023     (H) 01/25/2023    CHOL 157 12/02/2022    TRIG 136 12/02/2022    HDL 42 12/02/2022    ALT 43 (H) 09/26/2022    AST 28 09/26/2022     01/25/2023    K 3.7 01/25/2023     01/25/2023    CREATININE 0.56 01/25/2023    BUN 11 01/25/2023    CO2 26 01/25/2023    LABA1C 5.8 12/02/2022     Lab Results   Component Value Date    CALCIUM 8.9 01/25/2023     Lab Results   Component Value Date    LDLCHOLESTEROL 88 12/02/2022       Assessment and Plan:    1. Mild persistent asthma without complication  It seems that patient's asthma is uncontrolled. We will get the PFT and add ICS inhaler. We will follow-up with patient closely. Counseled patient regarding the use of the new inhaler and the importance of rinsing the mouth after using the inhaler. Patient was given strict return precautions if she developed any chest pain or severe shortness of breath. - Full PFT Study With Bronchodilator;  Future  - fluticasone (FLOVENT HFA) 110 MCG/ACT inhaler; Inhale 1 puff into the lungs 2 times daily  Dispense: 12 g; Refill: 3    2. Gastroesophageal reflux disease, unspecified whether esophagitis present  Will start patient on Pepcid and follow-up closely. - famotidine (PEPCID) 20 MG tablet; Take 1 tablet by mouth 2 times daily  Dispense: 60 tablet; Refill: 3          Requested Prescriptions     Signed Prescriptions Disp Refills    famotidine (PEPCID) 20 MG tablet 60 tablet 3     Sig: Take 1 tablet by mouth 2 times daily    fluticasone (FLOVENT HFA) 110 MCG/ACT inhaler 12 g 3     Sig: Inhale 1 puff into the lungs 2 times daily       Medications Discontinued During This Encounter   Medication Reason    ondansetron (ZOFRAN) 4 MG/2ML injection LIST CLEANUP    ondansetron (ZOFRAN ODT) 4 MG disintegrating tablet LIST CLEANUP    benzonatate (TESSALON PERLES) 100 MG capsule LIST CLEANUP    Benzocaine-Menthol (CEPACOL) 6-10 MG LOZG lozenge LIST CLEANUP    famotidine (PEPCID) 20 MG tablet     fluticasone (FLOVENT HFA) 110 MCG/ACT inhaler        Brenda received counseling on the following healthy behaviors: nutrition, exercise and medication adherence    Discussed use,benefit, and side effects of prescribed medications. Barriers to medication compliance addressed. All patient questions answered. Pt voiced understanding. Return in about 2 weeks (around 2/15/2023) for follow up asthma. Disclaimer: Some orall of this note was transcribed using voice-recognition software. This may cause typographical errors occasionally. Although all effort is made to fix these errors, please do not hesitate to contact our office if there Mag [place]blood concern with the understanding of this note.

## 2023-02-08 ENCOUNTER — TELEMEDICINE (OUTPATIENT)
Dept: FAMILY MEDICINE CLINIC | Age: 43
End: 2023-02-08
Payer: COMMERCIAL

## 2023-02-08 DIAGNOSIS — J45.31 MILD PERSISTENT ASTHMA WITH EXACERBATION: Primary | ICD-10-CM

## 2023-02-08 PROCEDURE — 99422 OL DIG E/M SVC 11-20 MIN: CPT | Performed by: STUDENT IN AN ORGANIZED HEALTH CARE EDUCATION/TRAINING PROGRAM

## 2023-02-08 RX ORDER — PREDNISONE 20 MG/1
20 TABLET ORAL 2 TIMES DAILY
Qty: 10 TABLET | Refills: 0 | Status: SHIPPED | OUTPATIENT
Start: 2023-02-08 | End: 2023-02-13

## 2023-02-08 RX ORDER — OSELTAMIVIR PHOSPHATE 75 MG/1
75 CAPSULE ORAL 2 TIMES DAILY
Qty: 10 CAPSULE | Refills: 0 | Status: SHIPPED | OUTPATIENT
Start: 2023-02-08 | End: 2023-02-13

## 2023-02-08 RX ORDER — ALBUTEROL SULFATE 2.5 MG/3ML
2.5 SOLUTION RESPIRATORY (INHALATION) EVERY 6 HOURS PRN
Qty: 30 EACH | Refills: 0 | Status: SHIPPED | OUTPATIENT
Start: 2023-02-08

## 2023-02-08 SDOH — ECONOMIC STABILITY: HOUSING INSECURITY
IN THE LAST 12 MONTHS, WAS THERE A TIME WHEN YOU DID NOT HAVE A STEADY PLACE TO SLEEP OR SLEPT IN A SHELTER (INCLUDING NOW)?: NO

## 2023-02-08 SDOH — ECONOMIC STABILITY: FOOD INSECURITY: WITHIN THE PAST 12 MONTHS, THE FOOD YOU BOUGHT JUST DIDN'T LAST AND YOU DIDN'T HAVE MONEY TO GET MORE.: NEVER TRUE

## 2023-02-08 SDOH — ECONOMIC STABILITY: FOOD INSECURITY: WITHIN THE PAST 12 MONTHS, YOU WORRIED THAT YOUR FOOD WOULD RUN OUT BEFORE YOU GOT MONEY TO BUY MORE.: NEVER TRUE

## 2023-02-08 SDOH — ECONOMIC STABILITY: INCOME INSECURITY: HOW HARD IS IT FOR YOU TO PAY FOR THE VERY BASICS LIKE FOOD, HOUSING, MEDICAL CARE, AND HEATING?: NOT HARD AT ALL

## 2023-02-08 NOTE — PROGRESS NOTES
Visit Information    Have you changed or started any medications since your last visit including any over-the-counter medicines, vitamins, or herbal medicines? no   Are you having any side effects from any of your medications? -  no  Have you stopped taking any of your medications? Is so, why? -  no    Have you seen any other physician or provider since your last visit? No  Have you had any other diagnostic tests since your last visit? No  Have you been seen in the emergency room and/or had an admission to a hospital since we last saw you? No  Have you had your routine dental cleaning in the past 6 months? no    Have you activated your Razz account?  If not, what are your barriers? no    Patient Care Team:  Kaushik Siddiqi MD as PCP - General (Family Medicine)    Medical History Review  Past Medical, Family, and Social History reviewed and does not contribute to the patient presenting condition    Health Maintenance   Topic Date Due    Pneumococcal 0-64 years Vaccine (1 - PCV) Never done    DTaP/Tdap/Td vaccine (1 - Tdap) Never done    Cervical cancer screen  Never done    COVID-19 Vaccine (4 - Booster for [place] series) 12/22/2021    A1C test (Diabetic or Prediabetic)  12/02/2023    Depression Monitoring  02/01/2024    Lipids  12/02/2027    Flu vaccine  Completed    Hepatitis C screen  Completed    HIV screen  Completed    Hepatitis A vaccine  Aged Out    Hib vaccine  Aged Out    Meningococcal (ACWY) vaccine  Aged Out

## 2023-02-08 NOTE — PROGRESS NOTES
Fred Devine is a 38 y.o. female evaluated via telephone on 2/8/2023 for Cough (Coughing up mucus )  . Documentation:  I communicated with the patient and/or health care decision maker about cough. Details of this discussion including any medical advice provided:     38year-old female presents with complaint of worsening cough- productive of  yellow phlegm for the past 2-3 days, associated with wheezing. She has been needing to use her rescue inhaler more frequently. Denies fever, chills, SOB, chest pain, sore throat, change in urine or bowel movement. She reports sick contacts. 1. Mild persistent asthma with exacerbation    - predniSONE (DELTASONE) 20 MG tablet; Take 1 tablet by mouth 2 times daily for 5 days  Dispense: 10 tablet; Refill: 0  - albuterol (PROVENTIL) (2.5 MG/3ML) 0.083% nebulizer solution; Take 3 mLs by nebulization every 6 hours as needed for Wheezing  Dispense: 30 each; Refill: 0  - oseltamivir (TAMIFLU) 75 MG capsule; Take 1 capsule by mouth 2 times daily for 5 days  Dispense: 10 capsule; Refill: 0      Total Time: minutes: 5-10 minutes    Fatou Vee was evaluated through a synchronous (real-time) audio encounter. Patient identification was verified at the start of the visit. She (or guardian if applicable) is aware that this is a billable service, which includes applicable co-pays. This visit was conducted with the patient's (and/or legal guardian's) verbal consent. She has not had a related appointment within my department in the past 7 days or scheduled within the next 24 hours. The patient was located at Home: [address] [place] [address],[address] [address]. The provider was located at [facility] (Appt Dept): [address]     Note: not billable if this call serves to triage the patient into an appointment for the relevant concern    Vega Braga MD

## 2023-02-08 NOTE — PROGRESS NOTES
Attending Physician Statement  I have discussed the care of Vanessa Mosher, 38 y.o. female,including pertinent history and exam findings,  with the resident Dr. Evie Jiménez MD.  History:  Chief Complaint   Patient presents with    Cough     Coughing up mucus      I have reviewed the key elements of the encounter with the resident. Examination was done by resident as documented in residents note. BP Readings from Last 3 Encounters:   02/28/23 135/89   02/23/23 127/83   02/01/23 107/76     LMP 01/20/2023   Lab Results   Component Value Date    WBC 13.0 (H) 01/25/2023    HGB 14.9 01/25/2023    HCT 48.0 (H) 01/25/2023     (H) 01/25/2023    CHOL 157 12/02/2022    TRIG 136 12/02/2022    HDL 42 12/02/2022    ALT 43 (H) 09/26/2022    AST 28 09/26/2022     01/25/2023    K 3.7 01/25/2023     01/25/2023    CREATININE 0.56 01/25/2023    BUN 11 01/25/2023    CO2 26 01/25/2023    LABA1C 5.8 12/02/2022     Lab Results   Component Value Date    CALCIUM 8.9 01/25/2023     Lab Results   Component Value Date    LDLCHOLESTEROL 88 12/02/2022     I agree with the assessment, plan and diagnosis of    Diagnosis Orders   1. Mild persistent asthma with exacerbation  predniSONE (DELTASONE) 20 MG tablet    albuterol (PROVENTIL) (2.5 MG/3ML) 0.083% nebulizer solution    oseltamivir (TAMIFLU) 75 MG capsule        I agree with  orders as documented by the resident. Recommendations: Will start tamiflu and agree with resident A&P. Return in about 1 week (around 2/15/2023).    (Lashawn Marino ) Dr. Monika Bolton MD

## 2023-02-08 NOTE — PATIENT INSTRUCTIONS
Thank you for letting us take care of you today. We hope all your questions were addressed. If a question was overlooked or something else comes to mind after you return home, please contact a member of your Care Team listed below. Your Care Team at [place] [address] is Team #1  Melvin Daley MD (Faculty)  Manuel Mohr MD(Resident)  Elsa Butt MD (Resident)  Sandee Phan DO (Resident)  Quin Veliz, YAJAIRA Roca., YAJAIRA Jacome., ANIKET Glez., Kobe Zavala., [facility] office)  Mildred Carey, [address] [facility] [address] [address] (Clinical Practice Manager)  Cynthia Royal [facility] (Clinical Pharmacist)     Office phone number: [phone]    If you need to get in right away due to illness, please be advised we have \"Same Day\" appointments available Monday-Friday. Please call us at [phone] option #3 to schedule your \"Same Day\" appointment.

## 2023-02-23 ENCOUNTER — OFFICE VISIT (OUTPATIENT)
Dept: FAMILY MEDICINE CLINIC | Age: 43
End: 2023-02-23
Payer: COMMERCIAL

## 2023-02-23 VITALS
BODY MASS INDEX: 50.79 KG/M2 | SYSTOLIC BLOOD PRESSURE: 127 MMHG | HEART RATE: 115 BPM | HEIGHT: 62 IN | DIASTOLIC BLOOD PRESSURE: 83 MMHG | WEIGHT: 276 LBS

## 2023-02-23 DIAGNOSIS — J45.20 MILD INTERMITTENT ASTHMA WITHOUT COMPLICATION: ICD-10-CM

## 2023-02-23 DIAGNOSIS — R09.82 POSTNASAL DRIP: ICD-10-CM

## 2023-02-23 DIAGNOSIS — H92.02 LEFT EAR PAIN: Primary | ICD-10-CM

## 2023-02-23 PROCEDURE — G8427 DOCREV CUR MEDS BY ELIG CLIN: HCPCS

## 2023-02-23 PROCEDURE — 99213 OFFICE O/P EST LOW 20 MIN: CPT

## 2023-02-23 PROCEDURE — G8482 FLU IMMUNIZE ORDER/ADMIN: HCPCS

## 2023-02-23 PROCEDURE — G8417 CALC BMI ABV UP PARAM F/U: HCPCS

## 2023-02-23 PROCEDURE — 69209 REMOVE IMPACTED EAR WAX UNI: CPT

## 2023-02-23 PROCEDURE — 1036F TOBACCO NON-USER: CPT

## 2023-02-23 RX ORDER — FLUTICASONE PROPIONATE 50 MCG
1 SPRAY, SUSPENSION (ML) NASAL DAILY
Qty: 1 EACH | Refills: 0 | Status: SHIPPED | OUTPATIENT
Start: 2023-02-23 | End: 2023-03-02

## 2023-02-23 RX ORDER — LORATADINE 10 MG/1
10 TABLET ORAL DAILY
Qty: 30 TABLET | Refills: 3 | Status: SHIPPED | OUTPATIENT
Start: 2023-02-23 | End: 2023-03-25

## 2023-02-23 ASSESSMENT — ENCOUNTER SYMPTOMS
TROUBLE SWALLOWING: 0
SORE THROAT: 0
COUGH: 0
SHORTNESS OF BREATH: 0
SINUS PRESSURE: 0
RHINORRHEA: 0
ABDOMINAL PAIN: 0
BACK PAIN: 0
CHEST TIGHTNESS: 0

## 2023-02-23 NOTE — PROGRESS NOTES
Subjective:    Alvin Becerra is a 38 y.o. female with  has a past medical history of Asthma, Bipolar affect, depressed ([address] Environmental allergies, GERD (gastroesophageal reflux disease), Lumbar pain, and PVD (peripheral vascular disease) ([address] Presented to the office today for:  Chief Complaint   Patient presents with    Ear Problem     Left ear pain for two days now, no problem hearing, lots of yellow wax production       HPI    Patient presented today for left ear pain, started yesterday with yellowish discharge. She denies any hearing loss, dizziness or lightheadedness, tinnitus, fever or headache. Patient states that she had some congestion few days ago with sore throat. Patient uses Q-tips to clean her ears. Patient has mild intermittent asthma, PFT appointment next week. Review of Systems   Constitutional:  Negative for chills and fever. HENT:  Positive for ear discharge, ear pain and postnasal drip. Negative for congestion, hearing loss, rhinorrhea, sinus pressure, sore throat and trouble swallowing. Respiratory:  Negative for cough, chest tightness and shortness of breath. Cardiovascular:  Negative for chest pain, palpitations and leg swelling. Gastrointestinal:  Negative for abdominal pain. Genitourinary:  Negative for dysuria, flank pain and frequency. Musculoskeletal:  Negative for back pain. Neurological:  Negative for weakness and headaches. Psychiatric/Behavioral:  Negative for agitation and behavioral problems. The patient is not nervous/anxious.         The patient has a   Family History   Problem Relation Age of Onset    Heart Attack Mother     No Known Problems Father        Objective:    /83 (Site: Left Upper Arm, Position: Sitting, Cuff Size: Large Adult)   Pulse (!) 115   Ht 5' 2\" (1.575 m)   Wt 276 lb (125.2 kg)   BMI 50.48 kg/m²    BP Readings from Last 3 Encounters:   02/23/23 127/83   02/01/23 107/76   01/25/23 (!) 139/93       Physical Exam  Constitutional:       Appearance: Normal appearance. HENT:      Right Ear: Tympanic membrane, ear canal and external ear normal. There is impacted cerumen. Left Ear: Tympanic membrane, ear canal and external ear normal. There is impacted cerumen. Nose: Congestion present. Mouth/Throat:      Mouth: Mucous membranes are moist.      Pharynx: No posterior oropharyngeal erythema. Cardiovascular:      Rate and Rhythm: Normal rate and regular rhythm. Pulmonary:      Effort: Pulmonary effort is normal.      Breath sounds: Normal breath sounds. No wheezing. Abdominal:      General: Abdomen is flat. Bowel sounds are normal.      Palpations: Abdomen is soft. Skin:     General: Skin is warm. Capillary Refill: Capillary refill takes less than 2 seconds. Neurological:      Mental Status: She is alert. (70165) Cerumen Removal - Irrigation/Lvg    Date/Time: 2/23/2023 3:10 PM  Performed by: Babatunde Palomino MD  Authorized by: Kash Garcia DO     Consent:     Consent obtained:  Verbal    Consent given by:  Patient    Risks, benefits, and alternatives were discussed: yes      Risks discussed:  Infection, pain, bleeding, TM perforation, incomplete removal and dizziness    Alternatives discussed:  Alternative treatment and delayed treatment  Universal protocol:     Procedure explained and questions answered to patient or proxy's satisfaction: yes      Patient identity confirmed:  Verbally with patient  Procedure details:     Location:  L ear    Procedure type: irrigation      Procedure outcomes: cerumen removed    Post-procedure details: Inspection:  No bleeding, TM intact and some cerumen remaining    Hearing quality:  Normal    Procedure completion:  Tolerated     Assessment and Plan:    1.  Left ear pain likely due to to postnasal drip and congestion  -No hearing loss or tinnitus  -Tympanic membrane intact, no irritation  - (23396) Cerumen Removal - Irrigation/Lvg  -Pain significantly improved after ear lavage  -Instructed to come back to clinic for antibiotics treatment if not totallty improved in 1 week    2. Postnasal drip  - loratadine (CLARITIN) 10 MG tablet; Take 1 tablet by mouth daily  Dispense: 30 tablet; Refill: 3  - fluticasone (FLONASE) 50 MCG/ACT nasal spray; 1 spray by Each Nostril route daily for 7 days  Dispense: 1 each; Refill: 0    3. Mild intermittent asthma without complication  -Patient uses rescue inhaler and nebulizer frequently  -Upcoming PFT on   - [employer] [facility] Order for (Specify) for nebulizer hose        Requested Prescriptions     Signed Prescriptions Disp Refills    loratadine (CLARITIN) 10 MG tablet 30 tablet 3     Sig: Take 1 tablet by mouth daily    fluticasone (FLONASE) 50 MCG/ACT nasal spray 1 each 0     Si spray by Each Nostril route daily for 7 days       Medications Discontinued During This Encounter   Medication Reason    loratadine (CLARITIN) 10 MG tablet REORDER       Discussed use, benefit, and side effects of prescribed medications. Barriers to medication compliance addressed. All patient questions answered. Pt voiced understanding. Return in about 1 month (around 3/23/2023) for Asthma and as needed for ear pain. Deepthi Ricks MD  Family medicine resident, PGY1     Disclaimer: Some orall of this note was transcribed using voice-recognition software. This may cause typographical errors occasionally. Although all effort is made to fix these errors, please do not hesitate to contact our office if there Lamond Fine concern with the understanding of this note.

## 2023-02-23 NOTE — PROGRESS NOTES
Visit Information    Have you changed or started any medications since your last visit including any over-the-counter medicines, vitamins, or herbal medicines? no   Have you stopped taking any of your medications? Is so, why? -  no  Are you having any side effects from any of your medications? - no    Have you seen any other physician or provider since your last visit?  no   Have you had any other diagnostic tests since your last visit?  no   Have you been seen in the emergency room and/or had an admission in a hospital since we last saw you?  no   Have you had your routine dental cleaning in the past 6 months?  no     Do you have an active MyChart account? If no, what is the barrier?   Yes    Patient Care Team:  Cristy Blas MD as PCP - General (Family Medicine)    Medical History Review  Past Medical, Family, and Social History reviewed and does contribute to the patient presenting condition    Health Maintenance   Topic Date Due    Pneumococcal 0-64 years Vaccine (1 - PCV) Never done    DTaP/Tdap/Td vaccine (1 - Tdap) Never done    Cervical cancer screen  Never done    COVID-19 Vaccine (4 - Booster for [place] series) 12/22/2021    A1C test (Diabetic or Prediabetic)  12/02/2023    Depression Monitoring  02/01/2024    Lipids  12/02/2027    Flu vaccine  Completed    Hepatitis C screen  Completed    HIV screen  Completed    Hepatitis A vaccine  Aged Out    Hib vaccine  Aged Out    Meningococcal (ACWY) vaccine  Aged Out

## 2023-02-23 NOTE — PATIENT INSTRUCTIONS
Return to office 4/7/2023 at 3:45pm.    Thank you for letting us take care of you today. We hope all your questions were addressed. If a question was overlooked or something else comes to mind after you return home, please contact a member of your Care Team listed below. Office phone number: [phone]    If you need to get in right away due to illness, please be advised we have \"Same Day\" appointments available Monday-Friday. Please call us at [phone] option #3 to schedule your \"Same Day\" appointment.

## 2023-02-27 NOTE — PROGRESS NOTES
Attending Physician Statement    Wt Readings from Last 3 Encounters:   02/23/23 276 lb (125.2 kg)   02/01/23 274 lb 12.8 oz (124.6 kg)   01/25/23 275 lb (124.7 kg)     Temp Readings from Last 3 Encounters:   02/01/23 98.1 °F (36.7 °C) (Oral)   01/25/23 97.4 °F (36.3 °C) (Oral)   11/10/22 97.5 °F (36.4 °C) (Oral)     BP Readings from Last 3 Encounters:   02/23/23 127/83   02/01/23 107/76   01/25/23 (!) 139/93     Pulse Readings from Last 3 Encounters:   02/23/23 (!) 115   02/01/23 82   01/25/23 85         I have discussed the care of Roman Smith, including pertinent history and exam findings with the resident. I have reviewed the key elements of all parts of the encounter with the resident. I agree with the assessment, plan and orders as documented by the resident.   (GE Modifier)

## 2023-02-28 ENCOUNTER — APPOINTMENT (OUTPATIENT)
Dept: GENERAL RADIOLOGY | Age: 43
End: 2023-02-28
Payer: COMMERCIAL

## 2023-02-28 ENCOUNTER — HOSPITAL ENCOUNTER (OUTPATIENT)
Dept: PULMONOLOGY | Age: 43
Discharge: HOME | End: 2023-02-28
Payer: COMMERCIAL

## 2023-02-28 ENCOUNTER — HOSPITAL ENCOUNTER (EMERGENCY)
Age: 43
Discharge: HOME | End: 2023-02-28
Attending: EMERGENCY MEDICINE
Payer: COMMERCIAL

## 2023-02-28 VITALS
RESPIRATION RATE: 18 BRPM | HEART RATE: 96 BPM | OXYGEN SATURATION: 97 % | SYSTOLIC BLOOD PRESSURE: 135 MMHG | TEMPERATURE: 98.6 F | DIASTOLIC BLOOD PRESSURE: 89 MMHG

## 2023-02-28 DIAGNOSIS — N30.00 ACUTE CYSTITIS WITHOUT HEMATURIA: Primary | ICD-10-CM

## 2023-02-28 DIAGNOSIS — J45.30 MILD PERSISTENT ASTHMA WITHOUT COMPLICATION: ICD-10-CM

## 2023-02-28 LAB
BACTERIA: ABNORMAL
BILIRUBIN URINE: NEGATIVE
COLOR: YELLOW
DLCO %PRED: NORMAL
DLCO PRED: NORMAL
DLCO/VA %PRED: NORMAL
DLCO/VA PRED: NORMAL
DLCO/VA: NORMAL
DLCO: NORMAL
EPITHELIAL CELLS UA: ABNORMAL /HPF (ref 0–5)
EXPIRATORY TIME-POST: NORMAL
EXPIRATORY TIME: NORMAL
FEF 25-75% %CHNG: NORMAL
FEF 25-75% %PRED-POST: NORMAL
FEF 25-75% %PRED-PRE: NORMAL
FEF 25-75% PRED: NORMAL
FEF 25-75%-POST: NORMAL
FEF 25-75%-PRE: NORMAL
FEV1 %PRED-POST: NORMAL
FEV1 %PRED-PRE: NORMAL
FEV1 PRED: NORMAL
FEV1-POST: NORMAL
FEV1-PRE: NORMAL
FEV1/FVC %PRED-POST: NORMAL
FEV1/FVC %PRED-PRE: NORMAL
FEV1/FVC PRED: NORMAL
FEV1/FVC-POST: NORMAL
FEV1/FVC-PRE: NORMAL
FVC %PRED-POST: NORMAL
FVC %PRED-PRE: NORMAL
FVC PRED: NORMAL
FVC-POST: NORMAL
FVC-PRE: NORMAL
GAW %PRED: NORMAL
GAW PRED: NORMAL
GAW: NORMAL
GLUCOSE UR STRIP.AUTO-MCNC: NEGATIVE MG/DL
HCG(URINE) PREGNANCY TEST: NEGATIVE
IC %PRED: NORMAL
IC PRED: NORMAL
IC: NORMAL
KETONES UR STRIP.AUTO-MCNC: NEGATIVE MG/DL
LEUKOCYTE ESTERASE UR QL STRIP.AUTO: ABNORMAL
MEP: NORMAL
MIP: NORMAL
MVV %PRED-PRE: NORMAL
MVV PRED: NORMAL
MVV-PRE: NORMAL
NITRITE UR QL STRIP.AUTO: NEGATIVE
PEF %PRED-POST: NORMAL
PEF %PRED-PRE: NORMAL
PEF PRED: NORMAL
PEF%CHNG: NORMAL
PEF-POST: NORMAL
PEF-PRE: NORMAL
PROT UR STRIP.AUTO-MCNC: 5.5 MG/DL (ref 5–8)
PROT UR STRIP.AUTO-MCNC: ABNORMAL MG/DL
RAW %PRED: NORMAL
RAW PRED: NORMAL
RAW: NORMAL
RBC CLUMPS #/AREA URNS AUTO: ABNORMAL /HPF (ref 0–2)
RV %PRED: NORMAL
RV PRED: NORMAL
RV: NORMAL
SPECIFIC GRAVITY UA: 1.06 (ref 1–1.03)
SVC %PRED: NORMAL
SVC PRED: NORMAL
SVC: NORMAL
TLC %PRED: NORMAL
TLC PRED: NORMAL
TLC: NORMAL
TURBIDITY: ABNORMAL
URINE HGB: ABNORMAL
UROBILINOGEN, URINE: NORMAL
VA %PRED: NORMAL
VA PRED: NORMAL
VA: NORMAL
VTG %PRED: NORMAL
VTG PRED: NORMAL
VTG: NORMAL
WBC UA: ABNORMAL /HPF (ref 0–5)

## 2023-02-28 PROCEDURE — 81001 URINALYSIS AUTO W/SCOPE: CPT

## 2023-02-28 PROCEDURE — 72100 X-RAY EXAM L-S SPINE 2/3 VWS: CPT

## 2023-02-28 PROCEDURE — 94726 PLETHYSMOGRAPHY LUNG VOLUMES: CPT

## 2023-02-28 PROCEDURE — 6370000000 HC RX 637 (ALT 250 FOR IP): Performed by: EMERGENCY MEDICINE

## 2023-02-28 PROCEDURE — 99284 EMERGENCY DEPT VISIT MOD MDM: CPT

## 2023-02-28 PROCEDURE — 81025 URINE PREGNANCY TEST: CPT

## 2023-02-28 RX ORDER — NITROFURANTOIN 25; 75 MG/1; MG/1
100 CAPSULE ORAL 2 TIMES DAILY
Qty: 14 CAPSULE | Refills: 0 | Status: SHIPPED | OUTPATIENT
Start: 2023-02-28 | End: 2023-03-07

## 2023-02-28 RX ORDER — NITROFURANTOIN 25; 75 MG/1; MG/1
100 CAPSULE ORAL ONCE
Status: COMPLETED | OUTPATIENT
Start: 2023-02-28 | End: 2023-02-28

## 2023-02-28 RX ADMIN — NITROFURANTOIN MONOHYDRATE/MACROCRYSTALS 100 MG: 75; 25 CAPSULE ORAL at 21:52

## 2023-02-28 ASSESSMENT — PAIN DESCRIPTION - LOCATION: LOCATION: BUTTOCKS

## 2023-02-28 ASSESSMENT — PAIN DESCRIPTION - ORIENTATION: ORIENTATION: LEFT

## 2023-02-28 ASSESSMENT — ENCOUNTER SYMPTOMS
COLOR CHANGE: 0
EYE REDNESS: 0
EYE DISCHARGE: 0
ABDOMINAL PAIN: 0
FACIAL SWELLING: 0
CONSTIPATION: 0
COUGH: 0
SHORTNESS OF BREATH: 0
DIARRHEA: 0
VOMITING: 0

## 2023-02-28 ASSESSMENT — PAIN SCALES - GENERAL: PAINLEVEL_OUTOF10: 10

## 2023-02-28 ASSESSMENT — PAIN DESCRIPTION - DESCRIPTORS: DESCRIPTORS: SHARP

## 2023-02-28 ASSESSMENT — PAIN - FUNCTIONAL ASSESSMENT: PAIN_FUNCTIONAL_ASSESSMENT: 0-10

## 2023-02-28 ASSESSMENT — PAIN DESCRIPTION - FREQUENCY: FREQUENCY: CONTINUOUS

## 2023-03-01 NOTE — ED PROVIDER NOTES
[address] [place] [address] ED  EMERGENCY DEPARTMENT ENCOUNTER      Pt Name: Pilo Gil  MRN: [id number]  [place] 1980  Date of evaluation: 2/28/2023  Provider: Sandor You MD    CHIEF COMPLAINT       Chief Complaint   Patient presents with    Buttocks Pain     Left onset today         HISTORY OF PRESENT ILLNESS  (Location/Symptom, Timing/Onset, Context/Setting, Quality, Duration, Modifying Factors, Severity.)   Pilo Gil is a 38 y.o. female who presents to the emergency department for pain in her left lower back. It began today. No dysuria or hematuria and there was no injury. No unusual activity and its sharp and she rated as a 10 and its continuous. It does not radiate down her leg      Nursing Notes were reviewed. ALLERGIES     Latex, Ampicillin, Bee venom, Codeine, Morphine, Pcn [penicillins], and Adhesive tape    CURRENT MEDICATIONS       Previous Medications    ACETAMINOPHEN (TYLENOL) 500 MG TABLET    Take 2 tablets by mouth 3 times daily for 5 days    ALBUTEROL (PROVENTIL) (2.5 MG/3ML) 0.083% NEBULIZER SOLUTION    Take 3 mLs by nebulization every 6 hours as needed for Wheezing    ALBUTEROL SULFATE HFA (PROVENTIL;VENTOLIN;PROAIR) 108 (90 BASE) MCG/ACT INHALER    Inhale 2 puffs into the lungs every 6 hours as needed for Wheezing or Shortness of Breath Indications: Wheezing    CETIRIZINE-PSUEDOEPHEDRINE (ZYRTEC-D) 5-120 MG PER EXTENDED RELEASE TABLET    Take 1 tablet by mouth 2 times daily    CHOLECALCIFEROL (VITAMIN D3) 125 MCG (5000 UT) CAPS        CITALOPRAM (CELEXA) 40 MG TABLET    Take 40 mg by mouth every morning.     DICLOFENAC SODIUM (VOLTAREN) 1 % GEL    Apply 4 g topically 4 times daily    ESCITALOPRAM OXALATE (LEXAPRO PO)    Take by mouth    FAMOTIDINE (PEPCID) 20 MG TABLET    Take 1 tablet by mouth 2 times daily    FLUTICASONE (FLONASE) 50 MCG/ACT NASAL SPRAY    1 spray by Each Nostril route daily for 7 days    FLUTICASONE (FLOVENT HFA) 110 MCG/ACT INHALER    Inhale 1 puff into the lungs 2 times daily    IBUPROFEN (ADVIL;MOTRIN) 600 MG TABLET    Take 1 tablet by mouth 4 times daily as needed for Pain    LAMOTRIGINE (LAMICTAL) 25 MG TABLET    Take 25 mg by mouth daily    LORATADINE (CLARITIN) 10 MG TABLET    Take 1 tablet by mouth daily    SODIUM CHLORIDE (OCEAN) 0.65 % NASAL SPRAY    1 spray by Nasal route as needed    TRAZODONE (DESYREL) 50 MG TABLET    Take 50 mg by mouth nightly       PAST MEDICAL HISTORY         Diagnosis Date    Asthma     history    Bipolar affect, depressed ([facility] [address]     ON MEDS    Environmental allergies     GERD (gastroesophageal reflux disease)     Lumbar pain     PVD (peripheral vascular disease) ([facility] [address]        SURGICAL HISTORY           Procedure Laterality Date    DENTAL SURGERY  2013    extraction   x  2  teeth    DILATION AND CURETTAGE OF UTERUS      WISDOM TOOTH EXTRACTION           FAMILY HISTORY           Problem Relation Age of Onset    Heart Attack Mother     No Known Problems Father      Family Status   Relation Name Status    Mother      Father          SOCIAL HISTORY      reports that she has never smoked. She has never used smokeless tobacco. She reports that she does not drink alcohol and does not use drugs. REVIEW OF SYSTEMS    (2-9 systems for level 4, 10 or more for level 5)     Review of Systems   Constitutional:  Negative for chills, fatigue and fever. HENT:  Negative for congestion, ear discharge and facial swelling. Eyes:  Negative for discharge and redness. Respiratory:  Negative for cough and shortness of breath. Cardiovascular:  Negative for chest pain. Gastrointestinal:  Negative for abdominal pain, constipation, diarrhea and vomiting. Genitourinary:  Negative for dysuria and hematuria. Musculoskeletal:  Negative for arthralgias. Skin:  Negative for color change and rash. Neurological:  Negative for syncope, numbness and headaches. Hematological:  Negative for adenopathy.    Psychiatric/Behavioral: Negative for confusion. The patient is not nervous/anxious. Except as noted above the remainder of the review of systems was reviewed and negative. PHYSICAL EXAM    (up to 7 for level 4, 8 or more for level 5)     Vitals:    02/28/23 1805   BP: 135/89   Pulse: 96   Resp: 18   Temp: 98.6 °F (37 °C)   TempSrc: Oral   SpO2: 97%       Physical Exam  Vitals reviewed. Constitutional:       General: She is not in acute distress. Appearance: She is well-developed. She is not diaphoretic. HENT:      Head: Normocephalic and atraumatic. Eyes:      General: No scleral icterus. Right eye: No discharge. Left eye: No discharge. Cardiovascular:      Rate and Rhythm: Normal rate and regular rhythm. Pulmonary:      Effort: Pulmonary effort is normal. No respiratory distress. Breath sounds: Normal breath sounds. No stridor. No wheezing or rales. Abdominal:      General: There is no distension. Palpations: Abdomen is soft. Tenderness: There is no abdominal tenderness. Musculoskeletal:         General: Normal range of motion. Cervical back: Neck supple. Comments: Lower back has no bruise rash or abrasion. Lymphadenopathy:      Cervical: No cervical adenopathy. Skin:     General: Skin is warm and dry. Findings: No erythema or rash. Neurological:      Mental Status: She is alert and oriented to person, place, and time.    Psychiatric:         Behavior: Behavior normal.           DIAGNOSTIC RESULTS     EKG: All EKG's are interpreted by the Emergency Department Physician who either signs or Co-signs this chart in the absence of a cardiologist.    Not indicated    RADIOLOGY:   Non-plain film images such as CT, Ultrasound and MRI are read by the radiologist. Plain radiographic images are visualized and preliminarily interpreted by the emergency physician with the below findings:    Interpretation per the Radiologist below, if available at the time of this note:    XR LUMBAR SPINE (2-3 VIEWS)    Result Date: 2/28/2023  EXAMINATION: XRAY VIEWS OF THE LUMBAR SPINE 2/28/2023 8:34 pm COMPARISON: None. HISTORY: ORDERING SYSTEM PROVIDED HISTORY: Atraumatic pain, left lower back TECHNOLOGIST PROVIDED HISTORY: Atraumatic pain, left lower back Reason for Exam: Pt states left lower back pain today, no known injury FINDINGS: No fracture or subluxation. Lumbar lordosis is maintained. Vertebral bodies heights are normal.  Mild degenerative changes are seen at L4-5, L3-L4 and L2-L3 with minimal disc space narrowing, and anterior marginal osteophyte formation. There is a joints are unremarkable. No acute findings. Mild spondylosis. LABS:  Labs Reviewed   URINALYSIS - Abnormal; Notable for the following components:       Result Value    Turbidity UA Cloudy (*)     Specific Gravity, UA 1.057 (*)     Urine Hgb TRACE (*)     Protein, UA TRACE (*)     Leukocyte Esterase, Urine TRACE (*)     All other components within normal limits   MICROSCOPIC URINALYSIS - Abnormal; Notable for the following components:    Bacteria, UA MODERATE (*)     All other components within normal limits   PREGNANCY, URINE   POCT URINE PREGNANCY       All other labs were within normal range or not returned as of this dictation.     EMERGENCY DEPARTMENT COURSE and DIFFERENTIAL DIAGNOSIS/MDM:   Vitals:    Vitals:    02/28/23 1805   BP: 135/89   Pulse: 96   Resp: 18   Temp: 98.6 °F (37 °C)   TempSrc: Oral   SpO2: 97%       Orders Placed This Encounter   Medications    nitrofurantoin, macrocrystal-monohydrate, (MACROBID) 100 MG capsule     Sig: Take 1 capsule by mouth 2 times daily for 7 days     Dispense:  14 capsule     Refill:  0    nitrofurantoin (macrocrystal-monohydrate) (MACROBID) capsule 100 mg     Order Specific Question:   Antimicrobial Indications     Answer:   Urinary Tract Infection       HEART SCORE: Not applicable    Medical Decision Making: Urinalysis shows presence of UTI and x-rays are negative. Have no clinical suspicion of a kidney stone. Treatment diagnosis and follow-up were discussed with the patient. Evaluation and treatment course in the ED, and plan of care upon discharge was discussed in length with the patient. Patient had no further questions prior to being discharged and was instructed to return to the ED for new or worsening symptoms. DDx include lumbar strain, lumbar arthritis, UTI, kidney stone. I will order labs including urinalysis and pregnancy test    Test considered, but not ordered: None    The patient was involved in his/her plan of care. The testing that was ordered was discussed with the patient. Any medications that may have been ordered were discussed with the patient. I have reviewed the patient's previous medical records using the electronic health record that we have available. Labs and imaging were reviewed. CONSULTS:  None    PROCEDURES:  None    FINAL IMPRESSION      1. Acute cystitis without hematuria          DISPOSITION/PLAN   DISPOSITION Decision To Discharge 02/28/2023 09:46:32 PM      PATIENT REFERRED TO:   Shivam Griffin MD  [address] [place] [id number]      As needed    [facility] [place] [facility]  [address] [facility]  [phone]    If symptoms worsen    DISCHARGE MEDICATIONS:     New Prescriptions    NITROFURANTOIN, MACROCRYSTAL-MONOHYDRATE, (MACROBID) 100 MG CAPSULE    Take 1 capsule by mouth 2 times daily for 7 days       The care is provided during an unprecedented national emergency due to the novel coronavirus, COVID-19.     (Please note that portions of this note were completed with a voice recognition program.  Efforts were made to edit the dictations but occasionally words are mis-transcribed.)    Eugene Panchal MD  Attending Emergency Physician           Eugene Panchal MD  02/28/23 2226

## 2023-03-09 ENCOUNTER — HOSPITAL ENCOUNTER (EMERGENCY)
Age: 43
Discharge: HOME | End: 2023-03-09
Attending: EMERGENCY MEDICINE
Payer: COMMERCIAL

## 2023-03-09 VITALS
DIASTOLIC BLOOD PRESSURE: 86 MMHG | RESPIRATION RATE: 14 BRPM | TEMPERATURE: 98.8 F | SYSTOLIC BLOOD PRESSURE: 139 MMHG | HEART RATE: 91 BPM | OXYGEN SATURATION: 96 %

## 2023-03-09 DIAGNOSIS — G89.29 CHRONIC LEFT-SIDED LOW BACK PAIN WITHOUT SCIATICA: Primary | ICD-10-CM

## 2023-03-09 DIAGNOSIS — M54.50 CHRONIC LEFT-SIDED LOW BACK PAIN WITHOUT SCIATICA: Primary | ICD-10-CM

## 2023-03-09 LAB
BACTERIA: ABNORMAL
BILIRUBIN URINE: NEGATIVE
CASTS UA: ABNORMAL /LPF (ref 0–2)
COLOR: YELLOW
EPITHELIAL CELLS UA: ABNORMAL /HPF (ref 0–5)
GLUCOSE UR STRIP.AUTO-MCNC: NEGATIVE MG/DL
HCG(URINE) PREGNANCY TEST: NEGATIVE
KETONES UR STRIP.AUTO-MCNC: NEGATIVE MG/DL
LEUKOCYTE ESTERASE UR QL STRIP.AUTO: ABNORMAL
MUCUS: ABNORMAL
NITRITE UR QL STRIP.AUTO: NEGATIVE
PROT UR STRIP.AUTO-MCNC: 5.5 MG/DL (ref 5–8)
PROT UR STRIP.AUTO-MCNC: NEGATIVE MG/DL
RBC CLUMPS #/AREA URNS AUTO: ABNORMAL /HPF (ref 0–2)
SPECIFIC GRAVITY UA: 1.02 (ref 1–1.03)
TURBIDITY: CLEAR
URINE HGB: ABNORMAL
UROBILINOGEN, URINE: NORMAL
WBC UA: ABNORMAL /HPF (ref 0–5)

## 2023-03-09 PROCEDURE — 81025 URINE PREGNANCY TEST: CPT

## 2023-03-09 PROCEDURE — 6370000000 HC RX 637 (ALT 250 FOR IP): Performed by: STUDENT IN AN ORGANIZED HEALTH CARE EDUCATION/TRAINING PROGRAM

## 2023-03-09 PROCEDURE — 99283 EMERGENCY DEPT VISIT LOW MDM: CPT

## 2023-03-09 PROCEDURE — 81001 URINALYSIS AUTO W/SCOPE: CPT

## 2023-03-09 RX ORDER — IBUPROFEN 800 MG/1
800 TABLET ORAL ONCE
Status: COMPLETED | OUTPATIENT
Start: 2023-03-09 | End: 2023-03-09

## 2023-03-09 RX ORDER — IBUPROFEN 600 MG/1
600 TABLET ORAL EVERY 8 HOURS PRN
Qty: 20 TABLET | Refills: 1 | Status: SHIPPED | OUTPATIENT
Start: 2023-03-09

## 2023-03-09 RX ADMIN — IBUPROFEN 800 MG: 800 TABLET, FILM COATED ORAL at 17:18

## 2023-03-09 ASSESSMENT — ENCOUNTER SYMPTOMS
NAUSEA: 0
DIARRHEA: 0
BACK PAIN: 1
ABDOMINAL PAIN: 0
VOMITING: 0

## 2023-03-09 NOTE — DISCHARGE INSTRUCTIONS
You were seen for low back pain. We obtained urine sample which showed no signs of infection. This is likely a muscle issue. You can take motrin every 8 hours to help with pain. Follow up with your PCP. The emergency department you develop severe worsening low back pain, difficulty walking, urinary or bowel incontinence, abdominal pain, nausea vomiting, chest pain or fatigue.

## 2023-03-09 NOTE — ED NOTES
The following labs were labeled with appropriate pt sticker and tubed to lab:     [] Blue     [] Lavender   [] on ice  [] Green/yellow  [] Green/black [] on ice  [] [place]ina [place]  [] on ice  [] Yellow  [] Red  [] Type/ Screen  [] ABG  [] VBG    [] COVID-19 swab    [] Rapid  [] PCR  [] Flu swab  [] Peds Viral Panel     [x] Urine Sample  [] Fecal Sample  [] Pelvic Cultures  [] Blood Cultures  [] X 2  [] STREP Cultures       Laurent Fulton LPN  43/86/78 0111

## 2023-03-09 NOTE — ED PROVIDER NOTES
[address] [facility]     Emergency Department     Faculty Attestation    I performed a history and physical examination of the patient and discussed management with the resident. I reviewed the residents note and agree with the documented findings and plan of care. Any areas of disagreement are noted on the chart. I was personally present for the key portions of any procedures. I have documented in the chart those procedures where I was not present during the key portions. I have reviewed the emergency nurses triage note. I agree with the chief complaint, past medical history, past surgical history, allergies, medications, social and family history as documented unless otherwise noted below. For Physician Assistant/ Nurse Practitioner cases/documentation I have personally evaluated this patient and have completed at least one if not all key elements of the E/M (history, physical exam, and MDM). Additional findings are as noted. Patient with back pain triage note says since last night she states since last week. States she was picking up something heavy again maybe last night maybe last week. Of note she was just treated for urinary tract infection she had back pain at that time but she states this feels different. Some radiation of the pain in the left leg but no incontinence no anticoagulation no injections procedures no IV drug use no fevers. On exam resting comfortably abdomen is obese but soft and nontender. Does have left paraspinal tenderness possible mild left CVA tenderness but no midline tenderness. Distally strong pulses both pedal pulses bilaterally 5-5 strength intact sensation all groups of the lower extremity.   Will check urine, plan discharge      Critical Care     none    Angelica Martinez MD, Yanet Johnston  Attending Emergency  Physician           Angelica Martinez MD  03/09/23 [id number]

## 2023-03-09 NOTE — ED PROVIDER NOTES
[facility] ED  Emergency Department Encounter  Emergency Medicine Resident     Pt Mauri Castillo  MRN: [id number]  [place] 1980  Date of evaluation: 3/9/23  PCP:  Kaylen Aguilera MD  Note Started: 4:01 PM EST      CHIEF COMPLAINT       Low back pain; spasms      HISTORY OF PRESENT ILLNESS  (Location/Symptom, Timing/Onset, Context/Setting, Quality, Duration, Modifying Factors, Severity.)      Santo Flores is a 38 y.o. female who presents with sided low back pain that began 2 days ago after she lifted something heavy. She has been able to ambulate. States pain is limited to the low back denies any shooting down her legs. No loss of sensation to the groin area, no loss of bowel or bladder. She was treated on 2/28 for urinary tract infection and she states that she took the full course of antibiotic. She is currently denying any dysuria    PAST MEDICAL / SURGICAL / SOCIAL / FAMILY HISTORY      has a past medical history of Asthma, Bipolar affect, depressed ([facility] [address] Environmental allergies, GERD (gastroesophageal reflux disease), Lumbar pain, and PVD (peripheral vascular disease) ([facility] [address] has a past surgical history that includes Dilation and curettage of uterus (2010); [place] tooth extraction; and Dental surgery (07/17/2013).       Social History     Socioeconomic History    Marital status: Single     Spouse name: Not on file    Number of children: Not on file    Years of education: Not on file    Highest education level: Not on file   Occupational History    Not on file   Tobacco Use    Smoking status: Never    Smokeless tobacco: Never   Vaping Use    Vaping Use: Never used   Substance and Sexual Activity    Alcohol use: No    Drug use: No    Sexual activity: Yes     Partners: Male   Other Topics Concern    Not on file   Social History Narrative    Not on file     Social Determinants of Health     Financial Resource Strain: Low Risk     Difficulty of Paying Living Expenses: Not hard at all   Food Insecurity: No Food Insecurity    Worried About [address] [employer] in the Last Year: Never true    Ran Out of Food in the Last Year: Never true   Transportation Needs: Unknown    Lack of Transportation (Medical): Not on file    Lack of Transportation (Non-Medical): No   Physical Activity: Not on file   Stress: Not on file   Social Connections: Not on file   Intimate Partner Violence: Not on file   Housing Stability: Unknown    Unable to Pay for Housing in the Last Year: Not on file    Number of Places Lived in the Last Year: Not on file    Unstable Housing in the Last Year: No       Family History   Problem Relation Age of Onset    Heart Attack Mother     No Known Problems Father        Allergies:  Latex, Ampicillin, Bee venom, Codeine, Morphine, Pcn [penicillins], and Adhesive tape    Home Medications:  Prior to Admission medications    Medication Sig Start Date End Date Taking?  Authorizing Provider   ibuprofen (ADVIL;MOTRIN) 600 MG tablet Take 1 tablet by mouth every 8 hours as needed for Pain 3/9/23  Yes Oziel Berumen MD   loratadine (CLARITIN) 10 MG tablet Take 1 tablet by mouth daily 2/23/23 3/25/23  Chanel Beebe MD   fluticasone [facility]) 50 MCG/ACT nasal spray 1 spray by Each Nostril route daily for 7 days 2/23/23 3/2/23  Chanel Beebe MD   albuterol (PROVENTIL) (2.5 MG/3ML) 0.083% nebulizer solution Take 3 mLs by nebulization every 6 hours as needed for Wheezing 2/8/23   Marcial Vergara MD   cetirizine-psuedoephedrine (ZYRTEC-D) 5-120 MG per extended release tablet Take 1 tablet by mouth 2 times daily  Patient not taking: Reported on 2/28/2023 12/19/22   Historical Provider, MD   Cholecalciferol (VITAMIN D3) 125 MCG (5000 UT) CAPS  1/9/23   Historical Provider, MD   sodium chloride (OCEAN) 0.65 % nasal spray 1 spray by Nasal route as needed 12/19/22   Historical Provider, MD   famotidine (PEPCID) 20 MG tablet Take 1 tablet by mouth 2 times daily 2/1/23   Marcial Vergara MD fluticasone (FLOVENT HFA) 110 MCG/ACT inhaler Inhale 1 puff into the lungs 2 times daily 2/1/23   Alysha Foster MD   acetaminophen (TYLENOL) 500 MG tablet Take 2 tablets by mouth 3 times daily for 5 days 1/25/23 1/30/23  Cheko Monson DO   lamoTRIgine (LAMICTAL) 25 MG tablet Take 25 mg by mouth daily    Historical Provider, MD   albuterol sulfate HFA (PROVENTIL;VENTOLIN;PROAIR) 108 (90 Base) MCG/ACT inhaler Inhale 2 puffs into the lungs every 6 hours as needed for Wheezing or Shortness of Breath Indications: Wheezing 10/20/22   Marleni Bruno MD   diclofenac sodium (VOLTAREN) 1 % GEL Apply 4 g topically 4 times daily  Patient not taking: Reported on 2/28/2023 10/20/22   Marleni Bruno MD   Escitalopram Oxalate (LEXAPRO PO) Take by mouth    Historical Provider, MD   citalopram (CELEXA) 40 MG tablet Take 40 mg by mouth every morning. Historical Provider, MD   traZODone (DESYREL) 50 MG tablet Take 50 mg by mouth nightly    Historical Provider, MD         REVIEW OF SYSTEMS       Review of Systems   Constitutional:  Negative for chills and fever. Gastrointestinal:  Negative for abdominal pain, diarrhea, nausea and vomiting. Genitourinary:  Negative for difficulty urinating, dysuria and urgency. Musculoskeletal:  Positive for back pain. Negative for gait problem and myalgias. Neurological:  Negative for numbness. PHYSICAL EXAM      INITIAL VITALS:   /86   Pulse 91   Temp 98.8 °F (37.1 °C) (Oral)   Resp 14   LMP 02/20/2023   SpO2 96%     Physical Exam  Constitutional:       Appearance: She is obese. She is not ill-appearing. Cardiovascular:      Rate and Rhythm: Normal rate and regular rhythm. Pulmonary:      Effort: Pulmonary effort is normal.      Breath sounds: Normal breath sounds. Abdominal:      General: Abdomen is flat. Palpations: Abdomen is soft. Tenderness: There is no abdominal tenderness. There is no right CVA tenderness or left CVA tenderness.    Musculoskeletal: Comments: Left lower lumbar paraspinal tenderness on palpation   Skin:     General: Skin is warm and dry. Neurological:      Mental Status: She is alert. Comments: 5 out of 5 strength bilateral lower extremities, DTRs patella 1+. Sensation in tact, negative babinski         DDX/DIAGNOSTIC RESULTS / EMERGENCY DEPARTMENT COURSE / MDM     Medical Decision Making  40-year-old female presenting with left-sided low back pain that began 2 days ago. Recently seen 2/28 diagnosed with acute cystitis also seen for left low back pain x-ray obtained which is negative. She completed full course of antibiotic at that time. States that the symptoms returned after she lifted something heavy. There is no weakness on exam 5 out of 5 strength in lower extremities, no concern for any cord compression or cauda equina based on exam.  No fevers or chills no history of IV drug abuse. He is denying dysuria. Patient is cognitively impaired and difficult to obtain history from. There is point tenderness over the left paraspinal lumbar region. We will obtain urinalysis and urine pregnancy to evaluate for any signs of pyelonephritis or urinary tract infection. Treat with Motrin. Anticipate discharge home. Amount and/or Complexity of Data Reviewed  Labs: ordered. Decision-making details documented in ED Course. Risk  Prescription drug management. EMERGENCY DEPARTMENT COURSE:      ED Course as of 03/09/23 1754   Thu Mar 09, 2023   1701 HCG(Urine) Pregnancy Test: NEGATIVE [QC]      ED Course User Index  [QC] Teodora Crain MD       PROCEDURES:  None    CONSULTS:  None    CRITICAL CARE:  There was significant risk of life threatening deterioration of patient's condition requiring my direct management. Critical care time 0 minutes, excluding any documented procedures. FINAL IMPRESSION      1.  Chronic left-sided low back pain without sciatica          DISPOSITION / PLAN     DISPOSITION Decision To Discharge 03/09/2023 05:41:51 PM      PATIENT REFERRED TO:  Radha Segura MD  [id number] [facility]  [address] [facility] [id number] [phone]    If symptoms worsen    DISCHARGE MEDICATIONS:  Discharge Medication List as of 3/9/2023  5:44 PM          Shmuel Bruce MD  Emergency Medicine Resident    (Please note that portions of thisnote were completed with a voice recognition program.  Efforts were made to edit the dictations but occasionally words are mis-transcribed.)        Shmuel Bruce MD  Resident  03/09/23 9821

## 2023-03-17 ENCOUNTER — TELEMEDICINE (OUTPATIENT)
Dept: FAMILY MEDICINE CLINIC | Age: 43
End: 2023-03-17

## 2023-03-17 DIAGNOSIS — Z76.0 MEDICATION REFILL: ICD-10-CM

## 2023-03-17 DIAGNOSIS — K04.7 DENTAL INFECTION: Primary | ICD-10-CM

## 2023-03-17 RX ORDER — LORATADINE 10 MG/1
10 TABLET ORAL DAILY
Qty: 30 TABLET | Refills: 3 | Status: SHIPPED | OUTPATIENT
Start: 2023-03-17 | End: 2023-04-16

## 2023-03-17 RX ORDER — CETIRIZINE HYDROCHLORIDE, PSEUDOEPHEDRINE HYDROCHLORIDE 5; 120 MG/1; MG/1
1 TABLET, FILM COATED, EXTENDED RELEASE ORAL 2 TIMES DAILY
Status: CANCELLED | OUTPATIENT
Start: 2023-03-17

## 2023-03-17 RX ORDER — FLUTICASONE PROPIONATE 110 UG/1
1 AEROSOL, METERED RESPIRATORY (INHALATION) 2 TIMES DAILY
Qty: 12 G | Refills: 3 | Status: SHIPPED | OUTPATIENT
Start: 2023-03-17

## 2023-03-17 RX ORDER — AZITHROMYCIN 1 G
1 PACKET (EA) ORAL ONCE
Qty: 1 EACH | Refills: 0 | Status: SHIPPED | OUTPATIENT
Start: 2023-03-17 | End: 2023-03-17

## 2023-03-17 RX ORDER — CLINDAMYCIN HYDROCHLORIDE 300 MG/1
300 CAPSULE ORAL 3 TIMES DAILY
Qty: 30 CAPSULE | Refills: 0 | Status: SHIPPED | OUTPATIENT
Start: 2023-03-17 | End: 2023-03-17

## 2023-03-17 RX ORDER — CITALOPRAM 40 MG/1
40 TABLET ORAL EVERY MORNING
Qty: 30 TABLET | Status: CANCELLED | OUTPATIENT
Start: 2023-03-17

## 2023-03-17 ASSESSMENT — PATIENT HEALTH QUESTIONNAIRE - PHQ9
1. LITTLE INTEREST OR PLEASURE IN DOING THINGS: 0
3. TROUBLE FALLING OR STAYING ASLEEP: 0
7. TROUBLE CONCENTRATING ON THINGS, SUCH AS READING THE NEWSPAPER OR WATCHING TELEVISION: 0
10. IF YOU CHECKED OFF ANY PROBLEMS, HOW DIFFICULT HAVE THESE PROBLEMS MADE IT FOR YOU TO DO YOUR WORK, TAKE CARE OF THINGS AT HOME, OR GET ALONG WITH OTHER PEOPLE: 0
SUM OF ALL RESPONSES TO PHQ QUESTIONS 1-9: 0
SUM OF ALL RESPONSES TO PHQ QUESTIONS 1-9: 0
9. THOUGHTS THAT YOU WOULD BE BETTER OFF DEAD, OR OF HURTING YOURSELF: 0
6. FEELING BAD ABOUT YOURSELF - OR THAT YOU ARE A FAILURE OR HAVE LET YOURSELF OR YOUR FAMILY DOWN: 0
8. MOVING OR SPEAKING SO SLOWLY THAT OTHER PEOPLE COULD HAVE NOTICED. OR THE OPPOSITE, BEING SO FIGETY OR RESTLESS THAT YOU HAVE BEEN MOVING AROUND A LOT MORE THAN USUAL: 0
SUM OF ALL RESPONSES TO PHQ QUESTIONS 1-9: 0
SUM OF ALL RESPONSES TO PHQ QUESTIONS 1-9: 0
4. FEELING TIRED OR HAVING LITTLE ENERGY: 0
5. POOR APPETITE OR OVEREATING: 0
2. FEELING DOWN, DEPRESSED OR HOPELESS: 0
SUM OF ALL RESPONSES TO PHQ9 QUESTIONS 1 & 2: 0

## 2023-03-17 ASSESSMENT — ENCOUNTER SYMPTOMS
SHORTNESS OF BREATH: 0
VOMITING: 0
COUGH: 0
NAUSEA: 0
SORE THROAT: 0
ABDOMINAL PAIN: 0

## 2023-03-17 NOTE — PROGRESS NOTES
Visit Information    Have you changed or started any medications since your last visit including any over-the-counter medicines, vitamins, or herbal medicines? no   Have you stopped taking any of your medications? Is so, why? -  no  Are you having any side effects from any of your medications? - no    Have you seen any other physician or provider since your last visit?  no   Have you had any other diagnostic tests since your last visit?  no   Have you been seen in the emergency room and/or had an admission in a hospital since we last saw you?  no   Have you had your routine dental cleaning in the past 6 months?  no     Do you have an active MyChart account? If no, what is the barrier?   No:     Patient Care Team:  Mohsen Mosley MD as PCP - General (Family Medicine)    Medical History Review  Past Medical, Family, and Social History reviewed and does not contribute to the patient presenting condition    Health Maintenance   Topic Date Due    Pneumococcal 0-64 years Vaccine (1 - PCV) Never done    DTaP/Tdap/Td vaccine (1 - Tdap) Never done    Cervical cancer screen  Never done    COVID-19 Vaccine (4 - Booster for [place] series) 12/22/2021    A1C test (Diabetic or Prediabetic)  12/02/2023    Depression Monitoring  02/01/2024    Lipids  12/02/2027    Flu vaccine  Completed    Hepatitis C screen  Completed    HIV screen  Completed    Hepatitis A vaccine  Aged Out    Hib vaccine  Aged Out    Meningococcal (ACWY) vaccine  Aged Out

## 2023-03-17 NOTE — PROGRESS NOTES
Sade Cerna (:  1980) is a Established patient, here for evaluation of the following:    Assessment & Plan     Below is the assessment and plan developed based on review of pertinent history, physical exam, labs, studies, and medications. 1. Dental infection  -     azithromycin (ZITHROMAX) 1 g powder; Take 1 Package by mouth once for 1 dose, Disp-1 each, R-0Normal  2. Medication refill  -     fluticasone (FLOVENT HFA) 110 MCG/ACT inhaler; Inhale 1 puff into the lungs 2 times daily, Disp-12 g, R-3Normal  -     loratadine (CLARITIN) 10 MG tablet; Take 1 tablet by mouth daily, Disp-30 tablet, R-3Normal    Discussed with patient to call her insurance to see which dentist is covered and to book an appointment with a dental clinic ASAP. Had initially prescribed clindamycin for patient, however, pharmacy called stating that patient is allergic. Unknown reaction. Will prescribed azithromycin instead of clindamycin at this time. Also discussed with patient to book in-person appt with us. Patient voiced understanding. Return in about 2 weeks (around 3/31/2023), or if symptoms worsen or fail to improve, for Gingivitis follow up. Subjective     HPI    Ms. Maricruz Landaverde, 37 yo F, with previous medical history of asthma, GERD, and bipolar disorder. Presents to the [facility] as a virtual visit to discuss sinus issues. Sinus pressure  Face and head  2-3 days  States that she has bottom right tooth infected  States that she has history of dental infection  Denies fevers, visual disturbances, discharge from nose, cough, sob, chest pain, and changes in bowel movements and/or urine. Patient does not have any other acute concerns at this time. Review of Systems   Constitutional:  Negative for chills and fever. HENT:  Positive for dental problem. Negative for congestion and sore throat. Eyes:  Negative for visual disturbance.    Respiratory:  Negative for cough

## 2023-03-26 ENCOUNTER — TELEPHONE (OUTPATIENT)
Dept: FAMILY MEDICINE CLINIC | Age: 43
End: 2023-03-26

## 2023-03-26 NOTE — TELEPHONE ENCOUNTER
Patient is a 40-year-old female with history of GERD, asthma bipolar disorder called the office due to concern of diarrhea. Patient states it happened 2 days ago, has been having 5-6 loose bowel movements per day. Patient states she denies any symptoms of dehydration including dizziness, lightheadedness. Patient states she is tolerating oral liquids fine. Encourage patient to make sure she drinks lots of fluids especially with electrolytes, as diarrhea can cause dehydration. Advised patient that if she develops signs of dehydration, and she cannot tolerate oral liquids that she should go to the ED for further evaluation. Patient understood.     [facility] Medicine Resident, PGY 2    3/26/2023  5:19 AM

## 2023-03-27 ENCOUNTER — HOSPITAL ENCOUNTER (EMERGENCY)
Age: 43
Discharge: HOME | End: 2023-03-27
Attending: EMERGENCY MEDICINE
Payer: COMMERCIAL

## 2023-03-27 ENCOUNTER — TELEPHONE (OUTPATIENT)
Dept: FAMILY MEDICINE CLINIC | Age: 43
End: 2023-03-27

## 2023-03-27 VITALS
HEART RATE: 84 BPM | DIASTOLIC BLOOD PRESSURE: 76 MMHG | WEIGHT: 237 LBS | TEMPERATURE: 98.6 F | BODY MASS INDEX: 43.35 KG/M2 | RESPIRATION RATE: 18 BRPM | OXYGEN SATURATION: 99 % | SYSTOLIC BLOOD PRESSURE: 108 MMHG

## 2023-03-27 DIAGNOSIS — R19.7 DIARRHEA, UNSPECIFIED TYPE: Primary | ICD-10-CM

## 2023-03-27 LAB
ABSOLUTE EOS #: 0.1 K/UL (ref 0–0.44)
ABSOLUTE IMMATURE GRANULOCYTE: 0.02 K/UL (ref 0–0.3)
ABSOLUTE LYMPH #: 2.41 K/UL (ref 1.1–3.7)
ABSOLUTE MONO #: 0.79 K/UL (ref 0.1–1.2)
ALBUMIN SERPL-MCNC: 4 G/DL (ref 3.5–5.2)
ALP SERPL-CCNC: 65 U/L (ref 35–104)
ALT SERPL-CCNC: 76 U/L (ref 5–33)
ANION GAP SERPL CALCULATED.3IONS-SCNC: 10 MMOL/L (ref 9–17)
AST SERPL-CCNC: 47 U/L
BASOPHILS # BLD: 0 % (ref 0–2)
BASOPHILS ABSOLUTE: 0.03 K/UL (ref 0–0.2)
BILIRUB SERPL-MCNC: 0.4 MG/DL (ref 0.3–1.2)
BUN SERPL-MCNC: 14 MG/DL (ref 6–20)
BUN/CREAT BLD: 25 (ref 9–20)
CALCIUM SERPL-MCNC: 8.6 MG/DL (ref 8.6–10.4)
CHLORIDE SERPL-SCNC: 107 MMOL/L (ref 98–107)
CO2 SERPL-SCNC: 23 MMOL/L (ref 20–31)
CREAT SERPL-MCNC: 0.56 MG/DL (ref 0.5–0.9)
EOSINOPHILS RELATIVE PERCENT: 1 % (ref 1–4)
GFR SERPL CREATININE-BSD FRML MDRD: >60 ML/MIN/1.73M2
GLUCOSE SERPL-MCNC: 81 MG/DL (ref 70–99)
HCT VFR BLD AUTO: 45.3 % (ref 36.3–47.1)
HGB BLD-MCNC: 14.2 G/DL (ref 11.9–15.1)
IMMATURE GRANULOCYTES: 0 %
LIPASE SERPL-CCNC: 16 U/L (ref 13–60)
LYMPHOCYTES # BLD: 28 % (ref 24–43)
MCH RBC QN AUTO: 28.2 PG (ref 25.2–33.5)
MCHC RBC AUTO-ENTMCNC: 31.3 G/DL (ref 28.4–34.8)
MCV RBC AUTO: 89.9 FL (ref 82.6–102.9)
MONOCYTES # BLD: 9 % (ref 3–12)
NRBC AUTOMATED: 0 PER 100 WBC
PDW BLD-RTO: 13.1 % (ref 11.8–14.4)
PLATELET # BLD AUTO: 416 K/UL (ref 138–453)
PMV BLD AUTO: 9.6 FL (ref 8.1–13.5)
POTASSIUM SERPL-SCNC: 4.1 MMOL/L (ref 3.7–5.3)
PROT SERPL-MCNC: 7.5 G/DL (ref 6.4–8.3)
RBC # BLD: 5.04 M/UL (ref 3.95–5.11)
SEG NEUTROPHILS: 62 % (ref 36–65)
SEGMENTED NEUTROPHILS ABSOLUTE COUNT: 5.18 K/UL (ref 1.5–8.1)
SODIUM SERPL-SCNC: 140 MMOL/L (ref 135–144)
WBC # BLD AUTO: 8.5 K/UL (ref 3.5–11.3)

## 2023-03-27 PROCEDURE — 2580000003 HC RX 258: Performed by: PHYSICIAN ASSISTANT

## 2023-03-27 PROCEDURE — 80053 COMPREHEN METABOLIC PANEL: CPT

## 2023-03-27 PROCEDURE — 83690 ASSAY OF LIPASE: CPT

## 2023-03-27 PROCEDURE — 99284 EMERGENCY DEPT VISIT MOD MDM: CPT

## 2023-03-27 PROCEDURE — 85025 COMPLETE CBC W/AUTO DIFF WBC: CPT

## 2023-03-27 RX ORDER — 0.9 % SODIUM CHLORIDE 0.9 %
1000 INTRAVENOUS SOLUTION INTRAVENOUS ONCE
Status: COMPLETED | OUTPATIENT
Start: 2023-03-27 | End: 2023-03-27

## 2023-03-27 RX ADMIN — SODIUM CHLORIDE 1000 ML: 9 INJECTION, SOLUTION INTRAVENOUS at 16:39

## 2023-03-27 ASSESSMENT — PAIN - FUNCTIONAL ASSESSMENT: PAIN_FUNCTIONAL_ASSESSMENT: NONE - DENIES PAIN

## 2023-03-27 NOTE — ED PROVIDER NOTES
eMERGENCY dEPARTMENT eNCOUnter   Independent Attestation     Pt Name: Ana M Hanna  MRN: [id number]  [place] 1980  Date of evaluation: 3/27/23     Ana M Hanna is a 38 y.o. female with CC: Diarrhea (Onset 4 days)      This visit was performed by both a physician and an APC. I performed all aspects of the MDM as documented.     Danita Means DO  Attending Emergency Physician                  America Sotelo DO  03/27/23 2002
for 5 days, Disp-30 tablet, R-0Print      lamoTRIgine (LAMICTAL) 25 MG tablet Take 25 mg by mouth dailyHistorical Med      albuterol sulfate HFA (PROVENTIL;VENTOLIN;PROAIR) 108 (90 Base) MCG/ACT inhaler Inhale 2 puffs into the lungs every 6 hours as needed for Wheezing or Shortness of Breath Indications: Wheezing, Disp-1 each, R-1Normal      diclofenac sodium (VOLTAREN) 1 % GEL Apply 4 g topically 4 times daily, Topical, 4 TIMES DAILY Starting Thu 10/20/2022, Disp-50 g, R-G, Normal      Escitalopram Oxalate (LEXAPRO PO) Take by mouthHistorical Med      citalopram (CELEXA) 40 MG tablet Take 40 mg by mouth every morning. traZODone (DESYREL) 50 MG tablet Take 50 mg by mouth nightlyHistorical Med             PAST MEDICAL HISTORY         Diagnosis Date    Asthma     history    Bipolar affect, depressed ([facility] [address]     ON MEDS    Environmental allergies     GERD (gastroesophageal reflux disease)     Lumbar pain     PVD (peripheral vascular disease) ([facility] [address]        SURGICAL HISTORY           Procedure Laterality Date    DENTAL SURGERY  2013    extraction   x  2  teeth    DILATION AND CURETTAGE OF UTERUS      WISDOM TOOTH EXTRACTION           HISTORY           Problem Relation Age of Onset    Heart Attack Mother     No Known Problems Father      Family Status   Relation Name Status    Mother      Father          SOCIAL HISTORY      reports that she has never smoked. She has never used smokeless tobacco. She reports that she does not drink alcohol and does not use drugs. REVIEW OFSYSTEMS    (2-9 systems for level 4, 10 or more for level 5)   Review of Systems    Except as noted above the remainder of the review of systems was reviewed and negative.      PHYSICAL EXAM    (up to 7 for level 4, 8 or more for level 5)     ED Triage Vitals [23 1554]   BP Temp Temp Source Heart Rate Resp SpO2 Height Weight   108/76 98.6 °F (37 °C) Oral 84 18 99 % -- 237 lb (107.5 kg)     Physical Exam  Constitutional:

## 2023-04-28 ENCOUNTER — OFFICE VISIT (OUTPATIENT)
Dept: FAMILY MEDICINE CLINIC | Age: 43
End: 2023-04-28
Payer: COMMERCIAL

## 2023-04-28 VITALS
HEART RATE: 78 BPM | DIASTOLIC BLOOD PRESSURE: 77 MMHG | TEMPERATURE: 97.5 F | HEIGHT: 62 IN | WEIGHT: 278.2 LBS | SYSTOLIC BLOOD PRESSURE: 104 MMHG | BODY MASS INDEX: 51.19 KG/M2

## 2023-04-28 DIAGNOSIS — J11.1 FLU: Primary | ICD-10-CM

## 2023-04-28 PROCEDURE — G8417 CALC BMI ABV UP PARAM F/U: HCPCS

## 2023-04-28 PROCEDURE — 1036F TOBACCO NON-USER: CPT

## 2023-04-28 PROCEDURE — 99213 OFFICE O/P EST LOW 20 MIN: CPT

## 2023-04-28 PROCEDURE — G8427 DOCREV CUR MEDS BY ELIG CLIN: HCPCS

## 2023-04-28 RX ORDER — LORATADINE 10 MG/1
10 TABLET ORAL DAILY
Qty: 30 TABLET | Refills: 0 | Status: SHIPPED | OUTPATIENT
Start: 2023-04-28

## 2023-04-28 RX ORDER — ACETAMINOPHEN 500 MG
500 TABLET ORAL 4 TIMES DAILY PRN
Qty: 120 TABLET | Refills: 0 | Status: SHIPPED | OUTPATIENT
Start: 2023-04-28

## 2023-04-28 ASSESSMENT — ENCOUNTER SYMPTOMS
RHINORRHEA: 1
DIARRHEA: 0
COUGH: 1
CHEST TIGHTNESS: 0
BACK PAIN: 0
COLOR CHANGE: 0
SHORTNESS OF BREATH: 0
ABDOMINAL PAIN: 0
SORE THROAT: 0
ABDOMINAL DISTENTION: 0
NAUSEA: 0

## 2023-05-10 NOTE — TELEPHONE ENCOUNTER
Last visit: 04/28/2023  Last Med refill:   Does patient have enough medication for 72 hours: No:     Next Visit Date:  No future appointments.     Health Maintenance   Topic Date Due    Pneumococcal 0-64 years Vaccine (1 - PCV) Never done    DTaP/Tdap/Td vaccine (1 - Tdap) Never done    Cervical cancer screen  Never done    COVID-19 Vaccine (4 - Booster for Pfizer series) 12/22/2021    A1C test (Diabetic or Prediabetic)  12/02/2023    Depression Monitoring  03/17/2024    Lipids  12/02/2027    Flu vaccine  Completed    Hepatitis C screen  Completed    HIV screen  Completed    Hepatitis A vaccine  Aged Out    Hib vaccine  Aged Out    Meningococcal (ACWY) vaccine  Aged Out    Depression Screen  Discontinued    Diabetes screen  Discontinued       Hemoglobin A1C (%)   Date Value   12/02/2022 5.8             ( goal A1C is < 7)   No results found for: LABMICR  LDL Cholesterol (mg/dL)   Date Value   12/02/2022 88       (goal LDL is <100)   AST (U/L)   Date Value   03/27/2023 47 (H)     ALT (U/L)   Date Value   03/27/2023 76 (H)     BUN (mg/dL)   Date Value   03/27/2023 14     BP Readings from Last 3 Encounters:   04/28/23 104/77   04/07/23 121/78   03/27/23 108/76          (goal 120/80)    All Future Testing planned in [facility]  Lab Frequency Next Occurrence   XR KNEE RIGHT (3 VIEWS) Once 04/07/2023               Patient Active Problem List:     Mild intermittent asthma without complication     Bipolar affective (HCC)     Left ear pain     Mild persistent asthma without complication     Need for prophylactic vaccination against diphtheria-tetanus-pertussis (DTP)     Gastroesophageal reflux disease     Mild persistent asthma with exacerbation

## 2023-05-11 RX ORDER — EPINEPHRINE 0.3 MG/.3ML
INJECTION SUBCUTANEOUS
Qty: 2 EACH | Refills: 1 | Status: SHIPPED | OUTPATIENT
Start: 2023-05-11

## 2023-07-12 DIAGNOSIS — K21.9 GASTROESOPHAGEAL REFLUX DISEASE, UNSPECIFIED WHETHER ESOPHAGITIS PRESENT: ICD-10-CM

## 2023-07-17 RX ORDER — FAMOTIDINE 20 MG/1
TABLET, FILM COATED ORAL
Qty: 62 TABLET | Refills: 0 | Status: SHIPPED | OUTPATIENT
Start: 2023-07-17

## 2023-07-19 ENCOUNTER — TELEPHONE (OUTPATIENT)
Dept: FAMILY MEDICINE CLINIC | Age: 43
End: 2023-07-19

## 2023-07-25 DIAGNOSIS — H92.01 RIGHT EAR PAIN: ICD-10-CM

## 2023-07-25 NOTE — TELEPHONE ENCOUNTER
Last visit: 04/28/2023  Last Med refill:   Does patient have enough medication for 72 hours: No:     Next Visit Date:  No future appointments.     Health Maintenance   Topic Date Due    Pneumococcal 0-64 years Vaccine (1 - PCV) Never done    DTaP/Tdap/Td vaccine (1 - Tdap) Never done    Cervical cancer screen  Never done    COVID-19 Vaccine (4 - Booster for Pfizer series) 12/22/2021    Flu vaccine (1) 08/01/2023    A1C test (Diabetic or Prediabetic)  12/02/2023    Depression Monitoring  03/17/2024    Lipids  12/02/2027    Hepatitis C screen  Completed    HIV screen  Completed    Hepatitis A vaccine  Aged Out    Hib vaccine  Aged Out    Meningococcal (ACWY) vaccine  Aged Out    Depression Screen  Discontinued    Diabetes screen  Discontinued       Hemoglobin A1C (%)   Date Value   12/02/2022 5.8             ( goal A1C is < 7)   No components found for: LABMICR  LDL Cholesterol (mg/dL)   Date Value   12/02/2022 88       (goal LDL is <100)   AST (U/L)   Date Value   03/27/2023 47 (H)     ALT (U/L)   Date Value   03/27/2023 76 (H)     BUN (mg/dL)   Date Value   03/27/2023 14     BP Readings from Last 3 Encounters:   04/28/23 104/77   04/07/23 121/78   03/27/23 108/76          (goal 120/80)    All Future Testing planned in [facility]  Lab Frequency Next Occurrence   XR KNEE RIGHT (3 VIEWS) Once 04/07/2023               Patient Active Problem List:     Mild intermittent asthma without complication     Bipolar affective (HCC)     Left ear pain     Mild persistent asthma without complication     Need for prophylactic vaccination against diphtheria-tetanus-pertussis (DTP)     Gastroesophageal reflux disease     Mild persistent asthma with exacerbation

## 2023-07-27 RX ORDER — ADHESIVE BANDAGE 3/4"
BANDAGE TOPICAL
Qty: 15 ML | Refills: 5 | OUTPATIENT
Start: 2023-07-27

## 2023-08-02 DIAGNOSIS — K21.9 GASTROESOPHAGEAL REFLUX DISEASE, UNSPECIFIED WHETHER ESOPHAGITIS PRESENT: ICD-10-CM

## 2023-08-02 DIAGNOSIS — J11.1 FLU: ICD-10-CM

## 2023-08-03 RX ORDER — FAMOTIDINE 20 MG/1
TABLET, FILM COATED ORAL
Qty: 60 TABLET | Refills: 0 | OUTPATIENT
Start: 2023-08-03

## 2023-08-03 NOTE — TELEPHONE ENCOUNTER
Last visit: 04/28/2023  Last Med refill: 07/17/2023  Does patient have enough medication for 72 hours: No:     Next Visit Date:  No future appointments.     Health Maintenance   Topic Date Due    Pneumococcal 0-64 years Vaccine (1 - PCV) Never done    DTaP/Tdap/Td vaccine (1 - Tdap) Never done    Cervical cancer screen  Never done    COVID-19 Vaccine (4 - Booster for Pfizer series) 12/22/2021    Flu vaccine (1) 08/01/2023    A1C test (Diabetic or Prediabetic)  12/02/2023    Depression Monitoring  03/17/2024    Lipids  12/02/2027    Hepatitis C screen  Completed    HIV screen  Completed    Hepatitis A vaccine  Aged Out    Hib vaccine  Aged Out    Meningococcal (ACWY) vaccine  Aged Out    Depression Screen  Discontinued    Diabetes screen  Discontinued       Hemoglobin A1C (%)   Date Value   12/02/2022 5.8             ( goal A1C is < 7)   No components found for: LABMICR  LDL Cholesterol (mg/dL)   Date Value   12/02/2022 88       (goal LDL is <100)   AST (U/L)   Date Value   03/27/2023 47 (H)     ALT (U/L)   Date Value   03/27/2023 76 (H)     BUN (mg/dL)   Date Value   03/27/2023 14     BP Readings from Last 3 Encounters:   04/28/23 104/77   04/07/23 121/78   03/27/23 108/76          (goal 120/80)    All Future Testing planned in [facility]  Lab Frequency Next Occurrence   XR KNEE RIGHT (3 VIEWS) Once 04/07/2023               Patient Active Problem List:     Mild intermittent asthma without complication     Bipolar affective (HCC)     Left ear pain     Mild persistent asthma without complication     Need for prophylactic vaccination against diphtheria-tetanus-pertussis (DTP)     Gastroesophageal reflux disease     Mild persistent asthma with exacerbation

## 2023-08-10 ENCOUNTER — HOSPITAL ENCOUNTER (EMERGENCY)
Age: 43
Discharge: HOME | End: 2023-08-10
Attending: EMERGENCY MEDICINE
Payer: COMMERCIAL

## 2023-08-10 ENCOUNTER — APPOINTMENT (OUTPATIENT)
Dept: GENERAL RADIOLOGY | Age: 43
End: 2023-08-10
Payer: COMMERCIAL

## 2023-08-10 VITALS
TEMPERATURE: 97 F | HEART RATE: 75 BPM | DIASTOLIC BLOOD PRESSURE: 85 MMHG | HEIGHT: 62 IN | RESPIRATION RATE: 18 BRPM | SYSTOLIC BLOOD PRESSURE: 127 MMHG | OXYGEN SATURATION: 95 % | BODY MASS INDEX: 52.74 KG/M2 | WEIGHT: 286.6 LBS

## 2023-08-10 DIAGNOSIS — M25.561 ACUTE PAIN OF RIGHT KNEE: Primary | ICD-10-CM

## 2023-08-10 PROCEDURE — 99283 EMERGENCY DEPT VISIT LOW MDM: CPT

## 2023-08-10 PROCEDURE — 6370000000 HC RX 637 (ALT 250 FOR IP): Performed by: STUDENT IN AN ORGANIZED HEALTH CARE EDUCATION/TRAINING PROGRAM

## 2023-08-10 PROCEDURE — 73562 X-RAY EXAM OF KNEE 3: CPT

## 2023-08-10 RX ORDER — ACETAMINOPHEN 500 MG
1000 TABLET ORAL ONCE
Status: COMPLETED | OUTPATIENT
Start: 2023-08-10 | End: 2023-08-10

## 2023-08-10 RX ADMIN — ACETAMINOPHEN 1000 MG: 500 TABLET ORAL at 15:44

## 2023-08-10 ASSESSMENT — ENCOUNTER SYMPTOMS
DIARRHEA: 0
SORE THROAT: 0
WHEEZING: 0
NAUSEA: 0
CONSTIPATION: 0
SINUS PRESSURE: 0
SHORTNESS OF BREATH: 0
VOMITING: 0
ABDOMINAL PAIN: 0
BACK PAIN: 0

## 2023-08-10 ASSESSMENT — PAIN DESCRIPTION - LOCATION
LOCATION: LEG
LOCATION: LEG

## 2023-08-10 ASSESSMENT — PAIN SCALES - GENERAL
PAINLEVEL_OUTOF10: 8
PAINLEVEL_OUTOF10: 8

## 2023-08-10 ASSESSMENT — PAIN - FUNCTIONAL ASSESSMENT: PAIN_FUNCTIONAL_ASSESSMENT: 0-10

## 2023-08-10 NOTE — DISCHARGE INSTRUCTIONS
You are seen in the emergency department for knee pain. X-ray was negative for bony abnormality. This unfortunately does not rule out any injury to the internal part of the knee. Please contact your previous orthopedist for follow-up. Please return to the emergency department for any new or worsening symptoms including worsening pain, swelling, numbness or tingling in the leg or foot, or any symptoms deemed concerning.

## 2023-08-10 NOTE — ED NOTES
Pt reports to the ED with complaints of R leg pain. Pt states 2 days ago she missed her step and rotated the leg outward. PMS intact at this time and pt ambulatory from triage. Pt denies any other complaints at this time including chest pain and SOB.  Care ongoing       Renaee Mohs, RN  08/10/23 [id number]

## 2023-08-10 NOTE — ED PROVIDER NOTES
[address] [place] [address] ED  Emergency Department Encounter  Emergency Medicine Resident     Pt Igor Cardenas  MRN: [id number]  [facility] 1980  Date of evaluation: 8/10/23  PCP:  Vi Palacios MD  Note Started: 3:41 PM EDT      CHIEF COMPLAINT       Chief Complaint   Patient presents with    Leg Pain     Right leg pain, denies injury, pain x2days       HISTORY OF PRESENT ILLNESS  (Location/Symptom, Timing/Onset, Context/Setting, Quality, Duration, Modifying Factors, Severity.)      Domingo Matthews is a 38 y.o. female who presents with right knee pain. Patient states she was walking down the stairs couple days ago when she stepped and twisted her right knee. She states she has had pain in the area since. She rates her pain 4/10 in severity, described as aching. Did not fall to the ground. Her head and the injury. She denies any numbness or tingling in the leg or feet. Patient states she has a previous history of knee injury and was previously following with an orthopedist.  Patient denies any other complaints at this time. PAST MEDICAL / SURGICAL / SOCIAL / FAMILY HISTORY      has a past medical history of Asthma, Bipolar affect, depressed ([address]), Environmental allergies, GERD (gastroesophageal reflux disease), Lumbar pain, and PVD (peripheral vascular disease) ([address] has a past surgical history that includes Dilation and curettage of uterus (2010); Peridot tooth extraction; and Dental surgery (07/17/2013).       Social History     Socioeconomic History    Marital status: Single     Spouse name: Not on file    Number of children: Not on file    Years of education: Not on file    Highest education level: Not on file   Occupational History    Not on file   Tobacco Use    Smoking status: Never    Smokeless tobacco: Never   Vaping Use    Vaping Use: Never used   Substance and Sexual Activity    Alcohol use: No    Drug use: No    Sexual activity: Yes     Partners: Male   Other Topics

## 2023-08-15 ENCOUNTER — TELEPHONE (OUTPATIENT)
Dept: FAMILY MEDICINE CLINIC | Age: 43
End: 2023-08-15

## 2023-08-15 NOTE — TELEPHONE ENCOUNTER
Received a health link regarding patient having left shoulder pain. Patient states she noticed the pain last night when she was sleeping, pain starts in the shoulder and radiates to the neck and back. Patient denies any trauma to the area. Patient describes the pain 8 out of 10, worse with movements alleviated with rest.  Patient denies any chest pain, shortness of breath, lower extremity edema. Patient denies any tenderness on palpation of the shoulder. Patient denies any history of hypertension, diabetes. Advised patient that if she feels her left shoulder pain is getting worse to go to the ED for further evaluation, in the meantime she can call the office tomorrow to make an appointment to be seen by a PCP.     [address] [facility] Medicine Resident, PGY 3    8/15/2023  7:20 PM

## 2023-08-17 RX ORDER — LORATADINE 10 MG/1
TABLET ORAL
Qty: 30 TABLET | Refills: 0 | Status: SHIPPED | OUTPATIENT
Start: 2023-08-17

## 2023-08-17 RX ORDER — FAMOTIDINE 20 MG/1
TABLET, FILM COATED ORAL
Qty: 62 TABLET | Refills: 0 | Status: SHIPPED | OUTPATIENT
Start: 2023-08-17

## 2023-08-21 NOTE — TELEPHONE ENCOUNTER
Last visit: [id number]  Last Med refill: [id number]  Does patient have enough medication for 72 hours: No:     Tried to contact patient to schedule an appointment but phone was unable to left a message at this time . Next Visit Date:  No future appointments.     Health Maintenance   Topic Date Due    Pneumococcal 0-64 years Vaccine (1 - PCV) Never done    DTaP/Tdap/Td vaccine (1 - Tdap) Never done    Cervical cancer screen  Never done    COVID-19 Vaccine (4 - Booster for Pfizer series) 12/22/2021    Flu vaccine (1) 08/01/2023    Colorectal Cancer Screen  08/20/2023    A1C test (Diabetic or Prediabetic)  12/02/2023    Depression Monitoring  03/17/2024    Lipids  12/02/2027    Hepatitis C screen  Completed    HIV screen  Completed    Hepatitis A vaccine  Aged Out    Hib vaccine  Aged Out    Meningococcal (ACWY) vaccine  Aged Out    Depression Screen  Discontinued    Diabetes screen  Discontinued       Hemoglobin A1C (%)   Date Value   12/02/2022 5.8             ( goal A1C is < 7)   No components found for: LABMICR  LDL Cholesterol (mg/dL)   Date Value   12/02/2022 88       (goal LDL is <100)   AST (U/L)   Date Value   03/27/2023 47 (H)     ALT (U/L)   Date Value   03/27/2023 76 (H)     BUN (mg/dL)   Date Value   03/27/2023 14     BP Readings from Last 3 Encounters:   08/10/23 127/85   04/28/23 104/77   04/07/23 121/78          (goal 120/80)    All Future Testing planned in [facility]  Lab Frequency Next Occurrence   XR KNEE RIGHT (3 VIEWS) Once 04/07/2023               Patient Active Problem List:     Mild intermittent asthma without complication     Bipolar affective (HCC)     Left ear pain     Mild persistent asthma without complication     Need for prophylactic vaccination against diphtheria-tetanus-pertussis (DTP)     Gastroesophageal reflux disease     Mild persistent asthma with exacerbation

## 2023-09-12 DIAGNOSIS — K21.9 GASTROESOPHAGEAL REFLUX DISEASE, UNSPECIFIED WHETHER ESOPHAGITIS PRESENT: ICD-10-CM

## 2023-09-12 NOTE — TELEPHONE ENCOUNTER
Last visit: 4/28/23  Last Med refill: 8/17/23  Does patient have enough medication for 72 hours: No:     Next Visit Date:  No future appointments. Health Maintenance   Topic Date Due    Hepatitis B vaccine (1 of 3 - 3-dose series) Never done    Pneumococcal 0-64 years Vaccine (1 - PCV) Never done    DTaP/Tdap/Td vaccine (1 - Tdap) Never done    Cervical cancer screen  Never done    COVID-19 Vaccine (4 - Pfizer series) 12/22/2021    Flu vaccine (1) 08/01/2023    Colorectal Cancer Screen  08/20/2023    A1C test (Diabetic or Prediabetic)  12/02/2023    Depression Monitoring  03/17/2024    Lipids  12/02/2027    Hepatitis C screen  Completed    HIV screen  Completed    Hepatitis A vaccine  Aged Out    Hib vaccine  Aged Out    HPV vaccine  Aged Out    Meningococcal (ACWY) vaccine  Aged Out    Depression Screen  Discontinued    Diabetes screen  Discontinued       Hemoglobin A1C (%)   Date Value   12/02/2022 5.8             ( goal A1C is < 7)   No components found for: \"LABMICR\"  LDL Cholesterol (mg/dL)   Date Value   12/02/2022 88       (goal LDL is <100)   AST (U/L)   Date Value   03/27/2023 47 (H)     ALT (U/L)   Date Value   03/27/2023 76 (H)     BUN (mg/dL)   Date Value   03/27/2023 14     BP Readings from Last 3 Encounters:   08/10/23 127/85   04/28/23 104/77   04/07/23 121/78          (goal 120/80)    All Future Testing planned in [facility]  Lab Frequency Next Occurrence   XR KNEE RIGHT (3 VIEWS) Once 04/07/2023               Patient Active Problem List:     Mild intermittent asthma without complication     Bipolar affective (HCC)     Left ear pain     Mild persistent asthma without complication     Need for prophylactic vaccination against diphtheria-tetanus-pertussis (DTP)     Gastroesophageal reflux disease     Mild persistent asthma with exacerbation           Please review and address medication refill , if i can be an assistance be route to acceptable pool. Thank you.

## 2023-09-19 RX ORDER — FAMOTIDINE 20 MG/1
TABLET, FILM COATED ORAL
Qty: 62 TABLET | Refills: 0 | Status: SHIPPED | OUTPATIENT
Start: 2023-09-19

## 2023-09-23 ENCOUNTER — HOSPITAL ENCOUNTER (EMERGENCY)
Age: 43
Discharge: HOME | End: 2023-09-23
Attending: EMERGENCY MEDICINE
Payer: COMMERCIAL

## 2023-09-23 VITALS
OXYGEN SATURATION: 95 % | HEART RATE: 95 BPM | WEIGHT: 289 LBS | HEIGHT: 62 IN | TEMPERATURE: 97.4 F | BODY MASS INDEX: 53.18 KG/M2 | SYSTOLIC BLOOD PRESSURE: 136 MMHG | DIASTOLIC BLOOD PRESSURE: 87 MMHG | RESPIRATION RATE: 16 BRPM

## 2023-09-23 DIAGNOSIS — I82.4Y1 ACUTE DEEP VEIN THROMBOSIS (DVT) OF PROXIMAL VEIN OF RIGHT LOWER EXTREMITY (HCC): ICD-10-CM

## 2023-09-23 DIAGNOSIS — I82.491 ACUTE DEEP VEIN THROMBOSIS (DVT) OF OTHER SPECIFIED VEIN OF RIGHT LOWER EXTREMITY (HCC): Primary | ICD-10-CM

## 2023-09-23 DIAGNOSIS — M79.604 RIGHT LEG PAIN: ICD-10-CM

## 2023-09-23 PROBLEM — I82.409 ACUTE DEEP VEIN THROMBOSIS (DVT) OF LOWER EXTREMITY (HCC): Status: ACTIVE | Noted: 2023-09-23

## 2023-09-23 LAB
ANION GAP SERPL CALCULATED.3IONS-SCNC: 9 MMOL/L (ref 9–17)
BASOPHILS # BLD: 0.08 K/UL (ref 0–0.2)
BASOPHILS NFR BLD: 1 % (ref 0–2)
BUN SERPL-MCNC: 11 MG/DL (ref 6–20)
BUN/CREAT SERPL: 16 (ref 9–20)
CALCIUM SERPL-MCNC: 9 MG/DL (ref 8.6–10.4)
CHLORIDE SERPL-SCNC: 105 MMOL/L (ref 98–107)
CO2 SERPL-SCNC: 24 MMOL/L (ref 20–31)
CREAT SERPL-MCNC: 0.7 MG/DL (ref 0.5–0.9)
D DIMER PPP FEU-MCNC: 0.74 UG/ML FEU (ref 0–0.59)
EOSINOPHIL # BLD: 0.08 K/UL (ref 0–0.44)
EOSINOPHILS RELATIVE PERCENT: 1 % (ref 1–4)
ERYTHROCYTE [DISTWIDTH] IN BLOOD BY AUTOMATED COUNT: 13 % (ref 11.8–14.4)
GFR SERPL CREATININE-BSD FRML MDRD: >60 ML/MIN/1.73M2
GLUCOSE SERPL-MCNC: 123 MG/DL (ref 70–99)
HCT VFR BLD AUTO: 45.2 % (ref 36.3–47.1)
HGB BLD-MCNC: 14.3 G/DL (ref 11.9–15.1)
IMM GRANULOCYTES # BLD AUTO: 0.03 K/UL (ref 0–0.3)
IMM GRANULOCYTES NFR BLD: 0 %
INR PPP: 1
LYMPHOCYTES NFR BLD: 2.85 K/UL (ref 1.1–3.7)
LYMPHOCYTES RELATIVE PERCENT: 25 % (ref 24–43)
MCH RBC QN AUTO: 28.4 PG (ref 25.2–33.5)
MCHC RBC AUTO-ENTMCNC: 31.6 G/DL (ref 28.4–34.8)
MCV RBC AUTO: 89.7 FL (ref 82.6–102.9)
MONOCYTES NFR BLD: 1.02 K/UL (ref 0.1–1.2)
MONOCYTES NFR BLD: 9 % (ref 3–12)
NEUTROPHILS NFR BLD: 64 % (ref 36–65)
NEUTS SEG NFR BLD: 7.36 K/UL (ref 1.5–8.1)
NRBC BLD-RTO: 0 PER 100 WBC
PARTIAL THROMBOPLASTIN TIME: 27.1 SEC (ref 23.9–33.8)
PLATELET # BLD AUTO: 543 K/UL (ref 138–453)
PMV BLD AUTO: 9.3 FL (ref 8.1–13.5)
POTASSIUM SERPL-SCNC: 3.9 MMOL/L (ref 3.7–5.3)
PROTHROMBIN TIME: 12.9 SEC (ref 11.5–14.2)
RBC # BLD AUTO: 5.04 M/UL (ref 3.95–5.11)
SODIUM SERPL-SCNC: 138 MMOL/L (ref 135–144)
WBC OTHER # BLD: 11.4 K/UL (ref 3.5–11.3)

## 2023-09-23 PROCEDURE — 85025 COMPLETE CBC W/AUTO DIFF WBC: CPT

## 2023-09-23 PROCEDURE — 80048 BASIC METABOLIC PNL TOTAL CA: CPT

## 2023-09-23 PROCEDURE — 99283 EMERGENCY DEPT VISIT LOW MDM: CPT

## 2023-09-23 PROCEDURE — 6370000000 HC RX 637 (ALT 250 FOR IP): Performed by: EMERGENCY MEDICINE

## 2023-09-23 PROCEDURE — 85379 FIBRIN DEGRADATION QUANT: CPT

## 2023-09-23 PROCEDURE — 85610 PROTHROMBIN TIME: CPT

## 2023-09-23 PROCEDURE — 85730 THROMBOPLASTIN TIME PARTIAL: CPT

## 2023-09-23 RX ADMIN — APIXABAN 10 MG: 5 TABLET, FILM COATED ORAL at 15:23

## 2023-09-23 ASSESSMENT — ENCOUNTER SYMPTOMS
NAUSEA: 0
FACIAL SWELLING: 0
COLOR CHANGE: 0
BACK PAIN: 0
PHOTOPHOBIA: 0
VOICE CHANGE: 0
ABDOMINAL PAIN: 0
SHORTNESS OF BREATH: 0
TROUBLE SWALLOWING: 0
VOMITING: 0
CHEST TIGHTNESS: 0
EYE PAIN: 0

## 2023-09-23 NOTE — CONSULTS
ED ordered starter doses for Outpatient Management of DVT/PE. Dispensed  Apixaban (Eliquis) 5 mg Starter Pack #1 per policy. The patient is referred to the [address] [place] [address] Outpatient Anticoagulation Service with the following responsible outpatient physician: Betsy Rodríguez    Faxed Medication Management (Anticoagulation Service) appropriate billing information.   ([facility] [phone])

## 2023-09-23 NOTE — ED PROVIDER NOTES
EMERGENCY DEPARTMENT ENCOUNTER    Pt Name: Perla Askew  MRN: [id number]  [address] [facility] [place] 1980  Date of evaluation: 9/23/23  CHIEF COMPLAINT       Chief Complaint   Patient presents with    Leg Pain     HISTORY OF PRESENT ILLNESS   47-year-old female presenting to the ER complaining of right lower extremity pain that started 2 days ago. Patient denies any fall or trauma. The history is provided by the patient. Leg Injury  Location:  Leg  Time since incident:  2 days  Injury: no    Leg location:  R lower leg  Pain details:     Quality:  Aching and cramping  Associated symptoms: no back pain and no fatigue            REVIEW OF SYSTEMS     Review of Systems   Constitutional:  Negative for activity change, appetite change and fatigue. HENT:  Negative for facial swelling, trouble swallowing and voice change. Eyes:  Negative for photophobia and pain. Respiratory:  Negative for chest tightness and shortness of breath. Cardiovascular:  Negative for chest pain and palpitations. Gastrointestinal:  Negative for abdominal pain, nausea and vomiting. Genitourinary:  Negative for dysuria and urgency. Musculoskeletal:  Positive for myalgias (RLE). Negative for arthralgias and back pain. Skin:  Negative for color change and rash. Neurological:  Negative for dizziness, syncope and headaches. Psychiatric/Behavioral:  Negative for behavioral problems and hallucinations.       PASTMEDICAL HISTORY     Past Medical History:   Diagnosis Date    Asthma     history    Bipolar affect, depressed ([address])     ON MEDS    Environmental allergies     GERD (gastroesophageal reflux disease)     Lumbar pain     PVD (peripheral vascular disease) ([address])      Past Problem List  Patient Active Problem List   Diagnosis Code    Mild intermittent asthma without complication H02.35    Bipolar affective ([address]) F31.9    Left ear pain H92.02    Mild persistent asthma without complication L55.93    Need for prophylactic vaccination PM      OUTPATIENT FOLLOW UP THE PATIENT:  [facility] ED  [address]  [phone]  Go to   As needed, If symptoms worsen    Yany Ni MD  [address] [place] [address] Rd  [phone]    Schedule an appointment as soon as possible for a visit in 2 days      Eddie Hollingsworth [address]  [facility] [address]  [phone]    Schedule an appointment as soon as possible for a visit in 2 days      [facility]  [address] [facility] [address]  [phone]  Call in 2 days      [facility]  [address] [facility]  [phone]  Call in 2 days      Eddie Hollingsworth [address]  [facility] [address]  [phone]    Schedule an appointment as soon as possible for a visit in 2 days        DO Koby Rodriguez DO  09/23/23 1422

## 2023-09-25 ENCOUNTER — TELEPHONE (OUTPATIENT)
Dept: PHARMACY | Age: 43
End: 2023-09-25

## 2023-09-25 ENCOUNTER — HOSPITAL ENCOUNTER (OUTPATIENT)
Age: 43
Discharge: HOME | End: 2023-09-27
Payer: COMMERCIAL

## 2023-09-25 ENCOUNTER — HOSPITAL ENCOUNTER (OUTPATIENT)
Dept: VASCULAR LAB | Age: 43
Discharge: HOME | End: 2023-09-27
Attending: EMERGENCY MEDICINE
Payer: COMMERCIAL

## 2023-09-25 DIAGNOSIS — M79.604 RIGHT LEG PAIN: ICD-10-CM

## 2023-09-25 PROCEDURE — 93971 EXTREMITY STUDY: CPT

## 2023-09-25 PROCEDURE — 93971 EXTREMITY STUDY: CPT | Performed by: SURGERY

## 2023-09-25 NOTE — TELEPHONE ENCOUNTER
Attempted to reach patient to discuss recent ER visit and DVT results, but was unable to reach patient through provided phone number(s)

## 2023-10-13 DIAGNOSIS — K21.9 GASTROESOPHAGEAL REFLUX DISEASE, UNSPECIFIED WHETHER ESOPHAGITIS PRESENT: ICD-10-CM

## 2023-10-13 DIAGNOSIS — J11.1 FLU: ICD-10-CM

## 2023-10-16 RX ORDER — LORATADINE 10 MG/1
TABLET ORAL
Qty: 30 TABLET | Refills: 0 | Status: SHIPPED | OUTPATIENT
Start: 2023-10-16

## 2023-10-16 RX ORDER — FAMOTIDINE 20 MG/1
TABLET, FILM COATED ORAL
Qty: 60 TABLET | Refills: 0 | Status: SHIPPED | OUTPATIENT
Start: 2023-10-16

## 2023-10-16 NOTE — TELEPHONE ENCOUNTER
Last visit: 4/28/23  Last Med refill: 8/17/23  Does patient have enough medication for 72 hours: no    Next Visit Date:  No future appointments.     Health Maintenance   Topic Date Due    Hepatitis B vaccine (1 of 3 - 3-dose series) Never done    Pneumococcal 0-64 years Vaccine (1 - PCV) Never done    DTaP/Tdap/Td vaccine (1 - Tdap) Never done    Cervical cancer screen  Never done    COVID-19 Vaccine (4 - Pfizer series) 12/22/2021    Flu vaccine (1) 08/01/2023    Colorectal Cancer Screen  Never done    A1C test (Diabetic or Prediabetic)  12/02/2023    Depression Monitoring  03/17/2024    Lipids  12/02/2027    Hepatitis C screen  Completed    HIV screen  Completed    Hepatitis A vaccine  Aged Out    Hib vaccine  Aged Out    HPV vaccine  Aged Out    Meningococcal (ACWY) vaccine  Aged Out    Depression Screen  Discontinued    Diabetes screen  Discontinued       Hemoglobin A1C (%)   Date Value   12/02/2022 5.8             ( goal A1C is < 7)   No components found for: \"LABMICR\"  LDL Cholesterol (mg/dL)   Date Value   12/02/2022 88       (goal LDL is <100)   AST (U/L)   Date Value   03/27/2023 47 (H)     ALT (U/L)   Date Value   03/27/2023 76 (H)     BUN (mg/dL)   Date Value   09/23/2023 11     BP Readings from Last 3 Encounters:   09/23/23 136/87   08/10/23 127/85   04/28/23 104/77          (goal 120/80)    All Future Testing planned in [facility]  Lab Frequency Next Occurrence   XR KNEE RIGHT (3 VIEWS) Once 04/07/2023               Patient Active Problem List:     Mild intermittent asthma without complication     Bipolar affective (HCC)     Left ear pain     Mild persistent asthma without complication     Need for prophylactic vaccination against diphtheria-tetanus-pertussis (DTP)     Gastroesophageal reflux disease     Mild persistent asthma with exacerbation     Acute deep vein thrombosis (DVT) of lower extremity ([address])

## 2023-11-13 DIAGNOSIS — K21.9 GASTROESOPHAGEAL REFLUX DISEASE, UNSPECIFIED WHETHER ESOPHAGITIS PRESENT: ICD-10-CM

## 2023-11-13 DIAGNOSIS — J11.1 FLU: ICD-10-CM

## 2023-11-13 RX ORDER — FAMOTIDINE 20 MG/1
TABLET, FILM COATED ORAL
Qty: 62 TABLET | Refills: 0 | OUTPATIENT
Start: 2023-11-13

## 2023-11-13 RX ORDER — LORATADINE 10 MG/1
TABLET ORAL
Qty: 31 TABLET | Refills: 0 | OUTPATIENT
Start: 2023-11-13

## 2023-11-17 ENCOUNTER — TELEPHONE (OUTPATIENT)
Dept: FAMILY MEDICINE CLINIC | Age: 43
End: 2023-11-17

## 2023-11-17 NOTE — TELEPHONE ENCOUNTER
Patient contacted office an stated she is having trouble having a bowel movement, was able to go but very hard sruthi. Writer spoke to attending (Dr. Catherine Pierre) and attending stated for patient to take edema ( 1 time)  and then to talk milk of magnesia two times daily. Patient understands that she is to go to store and purchase fleets edema and take one today, and to purchase milk of magnesia and  two times daily for the next couple of days. Call back Monday if needed.

## 2024-03-28 DIAGNOSIS — J45.909 UNCOMPLICATED ASTHMA, UNSPECIFIED ASTHMA SEVERITY, UNSPECIFIED WHETHER PERSISTENT: ICD-10-CM

## 2024-03-28 NOTE — TELEPHONE ENCOUNTER
Last visit: 4/28/23  Last Med refill: 10/20/22  Does patient have enough medication for 72 hours: No:     Next Visit Date:  No future appointments.    Health Maintenance   Topic Date Due    Hepatitis B vaccine (1 of 3 - 3-dose series) Never done    Pneumococcal 0-64 years Vaccine (1 of 2 - PCV) Never done    DTaP/Tdap/Td vaccine (1 - Tdap) Never done    Cervical cancer screen  Never done    Flu vaccine (1) 08/01/2023    Colorectal Cancer Screen  Never done    COVID-19 Vaccine (4 - 2023-24 season) 09/01/2023    A1C test (Diabetic or Prediabetic)  12/02/2023    Depression Monitoring  03/17/2024    Lipids  12/02/2027    Hepatitis C screen  Completed    HIV screen  Completed    Hepatitis A vaccine  Aged Out    Hib vaccine  Aged Out    HPV vaccine  Aged Out    Polio vaccine  Aged Out    Meningococcal (ACWY) vaccine  Aged Out    Depression Screen  Discontinued    Diabetes screen  Discontinued       Hemoglobin A1C (%)   Date Value   12/02/2022 5.8             ( goal A1C is < 7)   No components found for: \"LABMICR\"  LDL Cholesterol (mg/dL)   Date Value   12/02/2022 88       (goal LDL is <100)   AST (U/L)   Date Value   03/27/2023 47 (H)     ALT (U/L)   Date Value   03/27/2023 76 (H)     BUN (mg/dL)   Date Value   09/23/2023 11     BP Readings from Last 3 Encounters:   09/23/23 136/87   08/10/23 127/85   04/28/23 104/77          (goal 120/80)    All Future Testing planned in [facility]  Lab Frequency Next Occurrence   XR KNEE RIGHT (3 VIEWS) Once 04/07/2023               Patient Active Problem List:     Mild intermittent asthma without complication     Bipolar affective (HCC)     Left ear pain     Mild persistent asthma without complication     Need for prophylactic vaccination against diphtheria-tetanus-pertussis (DTP)     Gastroesophageal reflux disease     Mild persistent asthma with exacerbation     Acute deep vein thrombosis (DVT) of lower extremity (HCC)

## 2024-03-29 RX ORDER — ALBUTEROL SULFATE 90 UG/1
2 AEROSOL, METERED RESPIRATORY (INHALATION) EVERY 6 HOURS PRN
Qty: 18 G | Refills: 0 | Status: SHIPPED | OUTPATIENT
Start: 2024-03-29

## 2024-05-15 DIAGNOSIS — Z76.0 MEDICATION REFILL: ICD-10-CM

## 2024-05-16 RX ORDER — DEXAMETHASONE 4 MG/1
2 TABLET ORAL 2 TIMES DAILY
Qty: 12 G | Refills: 4 | OUTPATIENT
Start: 2024-05-16

## 2024-08-22 ENCOUNTER — APPOINTMENT (OUTPATIENT)
Dept: GENERAL RADIOLOGY | Age: 44
End: 2024-08-22
Payer: MEDICAID

## 2024-08-22 ENCOUNTER — HOSPITAL ENCOUNTER (EMERGENCY)
Age: 44
Discharge: HOME | End: 2024-08-22
Attending: EMERGENCY MEDICINE
Payer: MEDICAID

## 2024-08-22 VITALS
SYSTOLIC BLOOD PRESSURE: 118 MMHG | WEIGHT: 289 LBS | OXYGEN SATURATION: 98 % | DIASTOLIC BLOOD PRESSURE: 80 MMHG | HEIGHT: 62 IN | HEART RATE: 79 BPM | BODY MASS INDEX: 53.18 KG/M2 | RESPIRATION RATE: 19 BRPM | TEMPERATURE: 97.5 F

## 2024-08-22 DIAGNOSIS — J45.31 MILD PERSISTENT ASTHMA WITH EXACERBATION: ICD-10-CM

## 2024-08-22 DIAGNOSIS — J45.909 UNCOMPLICATED ASTHMA, UNSPECIFIED ASTHMA SEVERITY, UNSPECIFIED WHETHER PERSISTENT: ICD-10-CM

## 2024-08-22 DIAGNOSIS — J18.9 PNEUMONIA OF RIGHT MIDDLE LOBE DUE TO INFECTIOUS ORGANISM: Primary | ICD-10-CM

## 2024-08-22 PROCEDURE — 94640 AIRWAY INHALATION TREATMENT: CPT

## 2024-08-22 PROCEDURE — 71045 X-RAY EXAM CHEST 1 VIEW: CPT

## 2024-08-22 PROCEDURE — 93005 ELECTROCARDIOGRAM TRACING: CPT

## 2024-08-22 PROCEDURE — 6370000000 HC RX 637 (ALT 250 FOR IP)

## 2024-08-22 PROCEDURE — 73060 X-RAY EXAM OF HUMERUS: CPT

## 2024-08-22 PROCEDURE — 99284 EMERGENCY DEPT VISIT MOD MDM: CPT

## 2024-08-22 RX ORDER — DOXYCYCLINE HYCLATE 100 MG
100 TABLET ORAL 2 TIMES DAILY
Qty: 14 TABLET | Refills: 0 | Status: SHIPPED | OUTPATIENT
Start: 2024-08-22 | End: 2024-08-29

## 2024-08-22 RX ORDER — ACETAMINOPHEN 325 MG/1
650 TABLET ORAL ONCE
Status: COMPLETED | OUTPATIENT
Start: 2024-08-22 | End: 2024-08-22

## 2024-08-22 RX ORDER — ALBUTEROL SULFATE 90 UG/1
2 AEROSOL, METERED RESPIRATORY (INHALATION) EVERY 6 HOURS PRN
Qty: 18 G | Refills: 0 | Status: SHIPPED | OUTPATIENT
Start: 2024-08-22 | End: 2024-09-21

## 2024-08-22 RX ORDER — LIDOCAINE 4 G/G
1 PATCH TOPICAL ONCE
Status: DISCONTINUED | OUTPATIENT
Start: 2024-08-22 | End: 2024-08-22 | Stop reason: HOSPADM

## 2024-08-22 RX ORDER — DOXYCYCLINE HYCLATE 100 MG
100 TABLET ORAL ONCE
Status: COMPLETED | OUTPATIENT
Start: 2024-08-22 | End: 2024-08-22

## 2024-08-22 RX ORDER — ALBUTEROL SULFATE 90 UG/1
2 AEROSOL, METERED RESPIRATORY (INHALATION) EVERY 4 HOURS PRN
Status: DISCONTINUED | OUTPATIENT
Start: 2024-08-22 | End: 2024-08-22 | Stop reason: HOSPADM

## 2024-08-22 RX ORDER — ACETAMINOPHEN 500 MG
1000 TABLET ORAL 3 TIMES DAILY
Qty: 42 TABLET | Refills: 0 | Status: SHIPPED | OUTPATIENT
Start: 2024-08-22 | End: 2024-08-29

## 2024-08-22 RX ADMIN — ACETAMINOPHEN 650 MG: 325 TABLET ORAL at 17:23

## 2024-08-22 RX ADMIN — ALBUTEROL SULFATE 2 PUFF: 90 AEROSOL, METERED RESPIRATORY (INHALATION) at 18:11

## 2024-08-22 RX ADMIN — DOXYCYCLINE HYCLATE 100 MG: 100 TABLET ORAL at 18:38

## 2024-08-22 ASSESSMENT — PAIN SCALES - GENERAL
PAINLEVEL_OUTOF10: 6
PAINLEVEL_OUTOF10: 10

## 2024-08-22 ASSESSMENT — PAIN DESCRIPTION - ORIENTATION
ORIENTATION: LEFT
ORIENTATION: LEFT

## 2024-08-22 ASSESSMENT — PAIN DESCRIPTION - DESCRIPTORS
DESCRIPTORS: ACHING
DESCRIPTORS: ACHING

## 2024-08-22 ASSESSMENT — PAIN DESCRIPTION - LOCATION
LOCATION: ARM
LOCATION: ARM

## 2024-08-22 ASSESSMENT — PAIN - FUNCTIONAL ASSESSMENT: PAIN_FUNCTIONAL_ASSESSMENT: 0-10

## 2024-08-22 NOTE — ED NOTES
Pt arrives alert and oriented x4 and ambulatory from triage   Pt complains of L arm pain x3 days   Pt denies any numbness or tingling   Pt denies injury   Pt denies any sob, chest pain, or dizziness   RR even and unlabored.   NAD noted.   Whiteboard updated.  Will continue with plan of care.     Acute UTI Acute UTI Acute UTI Acute UTI Chronic constipation Acute UTI Acute UTI Acute UTI

## 2024-08-22 NOTE — DISCHARGE INSTRUCTIONS
You were seen due to concerns for left arm pain, shortness of breath.  You do have pneumonia on your chest x-ray in the emergency department.  You will be discharged home with a course of doxycycline.  You should take the entire course of doxycycline and do not skip any doses.  You need to follow-up outpatient with primary care doctor in the next 24 to 48 hours for reevaluation of symptoms.  Return the emergency department immediately for any worsening shortness of breath, chest pain, dizziness or loss consciousness, fevers, chills, other new or concerning symptoms

## 2024-08-22 NOTE — ED PROVIDER NOTES
[facility] ED     Emergency Department     Faculty Attestation        I performed a history and physical examination of the patient and discussed management with the resident. I reviewed the resident’s note and agree with the documented findings and plan of care. Any areas of disagreement are noted on the chart. I was personally present for the key portions of any procedures. I have documented in the chart those procedures where I was not present during the key portions. I have reviewed the emergency nurses triage note. I agree with the chief complaint, past medical history, past surgical history, allergies, medications, social and family history as documented unless otherwise noted below.    For mid-level providers such as nurse practitioners as well as physicians assistants:    I have personally seen and evaluated the patient.    I find the patient's history and physical exam are consistent with NP/PA documentation.  I agree with the care provided, treatment rendered, disposition, & follow-up plan.     Additional findings are as noted.    Vital Signs: /80   Pulse 79   Temp 97.5 °F (36.4 °C) (Oral)   Resp 19   Ht 1.575 m (5' 2\")   Wt 131.1 kg (289 lb)   LMP  (LMP Unknown)   SpO2 98%   BMI 52.86 kg/m²   PCP:  Leticia Saenz MD    Pertinent Comments:           Critical Care  None          Cem Morales MD    Attending Emergency Medicine Physician            Michael Morales MD  08/22/24 [id number]

## 2024-08-22 NOTE — ED PROVIDER NOTES
daily    Provider, MD Rudolph   diclofenac sodium (VOLTAREN) 1 % GEL Apply 4 g topically 4 times daily 10/20/22   Leticia Saenz MD   Escitalopram Oxalate (LEXAPRO PO) Take by mouth    Rudolph Schwab MD   citalopram (CELEXA) 40 MG tablet Take 1 tablet by mouth every morning    Rudolph Schwab MD   traZODone (DESYREL) 50 MG tablet Take 1 tablet by mouth nightly    ProviderRuodlph MD           PHYSICAL EXAM      INITIAL VITALS:   /80   Pulse 79   Temp 97.5 °F (36.4 °C) (Oral)   Resp 19   Ht 1.575 m (5' 2\")   Wt 131.1 kg (289 lb)   LMP  (LMP Unknown)   SpO2 98%   BMI 52.86 kg/m²     Physical Exam  Vitals reviewed.   Constitutional:       General: She is not in acute distress.  HENT:      Head: Normocephalic and atraumatic.   Cardiovascular:      Rate and Rhythm: Normal rate and regular rhythm.   Pulmonary:      Effort: Pulmonary effort is normal.      Breath sounds: Wheezing present.   Abdominal:      Palpations: Abdomen is soft.      Tenderness: There is no abdominal tenderness.   Musculoskeletal:      Comments: Tenderness to the left midshaft humerus, no overlying skin changes, no bruising, redness, 2+ radial and ulnar pulses, 5 out of 5  strength, range of motion intact of the left shoulder, some reproduction of pain with flexion of the shoulder   Skin:     General: Skin is warm and dry.   Neurological:      General: No focal deficit present.      Mental Status: She is alert and oriented to person, place, and time.           DDX/DIAGNOSTIC RESULTS / EMERGENCY DEPARTMENT COURSE / MDM     Medical Decision Making  44-year-old female presents due to concerns for left arm pain, no associated trauma.  Also has slight shortness of breath without chest pain.  Patient's vitals within normal upon arrival  She is wheezing upon arrival, she is in no respiratory distress.  Heart is regular rate and rhythm.  Left humerus is tender to palpation over the midshaft, no signs of trauma,  neurovascular intact distally, pain reproducible with motion of the arm  DDx: Asthma exacerbation, pneumonia, contusion, arrhythmia  Will plan for breathing treatment, chest x-ray, humeral x-ray, symptomatic control, reevaluation    Amount and/or Complexity of Data Reviewed  Radiology: ordered.  ECG/medicine tests: ordered.    Risk  OTC drugs.  Prescription drug management.          EMERGENCY DEPARTMENT COURSE:  EKG at 1719, rate of 75, normal sinus rhythm with normal axis, no acute ST changes, normal EKG    ED Course as of 08/22/24 2320   Thu Aug 22, 2024   1833 Concern for right middle lobe pneumonia on chest x-ray [TD]      ED Course User Index  [TD] Ana Arias DO   X-ray of humerus without acute bony abnormality.  Plan for antibiotics for home.  Close outpatient follow-up with primary care doctor.  Given return precautions patient expressed understanding.  Patient also provided for refill of her inhaler prescription.  Patient medically stable for discharge at this time      CONSULTS:  None        FINAL IMPRESSION      1. Pneumonia of right middle lobe due to infectious organism    2. Mild persistent asthma with exacerbation    3. Uncomplicated asthma, unspecified asthma severity, unspecified whether persistent          DISPOSITION / PLAN     DISPOSITION Decision To Discharge 08/22/2024 07:40:29 PM  Condition at Disposition: Stable      PATIENT REFERRED TO:  Leticia Saenz MD  [address] [id number] [phone]    Schedule an appointment as soon as possible for a visit         DISCHARGE MEDICATIONS:  Discharge Medication List as of 8/22/2024  7:45 PM        START taking these medications    Details   doxycycline hyclate (VIBRA-TABS) 100 MG tablet Take 1 tablet by mouth 2 times daily for 7 days, Disp-14 tablet, R-0Normal             Ana Arias DO  Emergency Medicine Resident    (Please note that portions of this note were completed with a voice recognition program.  Efforts were made to

## 2024-08-23 LAB
EKG ATRIAL RATE: 75 BPM
EKG P AXIS: 13 DEGREES
EKG P-R INTERVAL: 156 MS
EKG Q-T INTERVAL: 400 MS
EKG QRS DURATION: 84 MS
EKG QTC CALCULATION (BAZETT): 446 MS
EKG R AXIS: 24 DEGREES
EKG T AXIS: 22 DEGREES
EKG VENTRICULAR RATE: 75 BPM

## 2024-12-27 ENCOUNTER — OFFICE VISIT (OUTPATIENT)
Dept: OBGYN CLINIC | Age: 44
End: 2024-12-27
Payer: MEDICAID

## 2024-12-27 VITALS
HEART RATE: 86 BPM | RESPIRATION RATE: 18 BRPM | DIASTOLIC BLOOD PRESSURE: 82 MMHG | SYSTOLIC BLOOD PRESSURE: 124 MMHG | WEIGHT: 274 LBS | HEIGHT: 62 IN | BODY MASS INDEX: 50.42 KG/M2

## 2024-12-27 DIAGNOSIS — N94.6 DYSMENORRHEA: ICD-10-CM

## 2024-12-27 DIAGNOSIS — Z12.11 ENCOUNTER FOR SCREENING FOR MALIGNANT NEOPLASM OF COLON: ICD-10-CM

## 2024-12-27 DIAGNOSIS — Z12.31 ENCOUNTER FOR SCREENING MAMMOGRAM FOR BREAST CANCER: ICD-10-CM

## 2024-12-27 DIAGNOSIS — N89.8 VAGINAL ODOR: ICD-10-CM

## 2024-12-27 DIAGNOSIS — N92.0 MENORRHAGIA WITH REGULAR CYCLE: ICD-10-CM

## 2024-12-27 DIAGNOSIS — Z32.02 NEGATIVE PREGNANCY TEST: ICD-10-CM

## 2024-12-27 DIAGNOSIS — Z11.51 SPECIAL SCREENING EXAMINATION FOR HUMAN PAPILLOMAVIRUS (HPV): ICD-10-CM

## 2024-12-27 DIAGNOSIS — Z01.419 WELL FEMALE EXAM WITH ROUTINE GYNECOLOGICAL EXAM: Primary | ICD-10-CM

## 2024-12-27 DIAGNOSIS — Z86.718 HX OF DEEP VENOUS THROMBOSIS: ICD-10-CM

## 2024-12-27 DIAGNOSIS — I82.4Y9 DEEP VEIN THROMBOSIS (DVT) OF PROXIMAL LOWER EXTREMITY, UNSPECIFIED CHRONICITY, UNSPECIFIED LATERALITY (HCC): Primary | ICD-10-CM

## 2024-12-27 LAB
CONTROL: NORMAL
PREGNANCY TEST URINE, POC: NEGATIVE

## 2024-12-27 PROCEDURE — 96372 THER/PROPH/DIAG INJ SC/IM: CPT | Performed by: NURSE PRACTITIONER

## 2024-12-27 PROCEDURE — 99203 OFFICE O/P NEW LOW 30 MIN: CPT | Performed by: NURSE PRACTITIONER

## 2024-12-27 PROCEDURE — 81025 URINE PREGNANCY TEST: CPT | Performed by: NURSE PRACTITIONER

## 2024-12-27 PROCEDURE — 99386 PREV VISIT NEW AGE 40-64: CPT | Performed by: NURSE PRACTITIONER

## 2024-12-27 RX ORDER — METRONIDAZOLE 500 MG/1
500 TABLET ORAL 2 TIMES DAILY
Qty: 14 TABLET | Refills: 0 | Status: SHIPPED | OUTPATIENT
Start: 2024-12-27 | End: 2025-01-03

## 2024-12-27 RX ORDER — WHEAT DEXTRIN 3 G/3.5 G
1 POWDER (GRAM) ORAL DAILY PRN
COMMUNITY
Start: 2024-08-19

## 2024-12-27 RX ORDER — MEDROXYPROGESTERONE ACETATE 150 MG/ML
150 INJECTION, SUSPENSION INTRAMUSCULAR ONCE
Status: COMPLETED | OUTPATIENT
Start: 2024-12-27 | End: 2024-12-27

## 2024-12-27 RX ORDER — POLYETHYLENE GLYCOL 3350 17 G/17G
POWDER, FOR SOLUTION ORAL
COMMUNITY
Start: 2023-11-20

## 2024-12-27 RX ADMIN — MEDROXYPROGESTERONE ACETATE 150 MG: 150 INJECTION, SUSPENSION INTRAMUSCULAR at 11:38

## 2024-12-27 NOTE — PROGRESS NOTES
History and Physical  [facility]  [facility] [address]  [place], [place]  [address] [phone]   Fax [phone]  Brenda Mireles  12/27/2024              44 y.o.  Chief Complaint   Patient presents with    Established New Doctor    Annual Exam       Patient's last menstrual period was 11/20/2024 (approximate).             Primary Care Physician: Leticia Saenz MD    The patient was seen and examined. She has no chief complaint today and is here for her annual exam.  Her bowels are regular. There are no voiding complaints. She denies any bloating.  She denies vaginal discharge and was counseled on STD's and the need for barrier contraception.     HPI : Brenda Mireles is a 44 y.o. female No obstetric history on file.    Annual exam  Desires to restart depo- used in past for menorrhagia  Menses every month, lasting 7 days, heavy flow- changing pad every 1-2 hours   Aware of risk of blood clots- hx DVT- no longer being managed   Reviewed need for clearance from hematology to continue on depo  C/o cramping with menses   Caregiver Francisca present for exam - aware and agreeable to following up with hematology   Francisca states patient is dealing with intermittent vaginal odor x couple months- states recently odor is doing better d/t increase in personal hygiene    no Bloating  no Early Satiety  no Unexplained weight change of more than 15 lbs  no  PMB  no  PCB  ________________________________________________________________________  OB History   No obstetric history on file.     Past Medical History:   Diagnosis Date    Asthma     history    Bipolar affect, depressed ([facility])     ON MEDS    Environmental allergies     GERD (gastroesophageal reflux disease)     Lumbar pain     PVD (peripheral vascular disease) ([facility])                                                                    Past Surgical History:   Procedure Laterality Date    DENTAL SURGERY  07/17/2013    extraction   x  2  teeth

## 2025-01-17 ENCOUNTER — TELEPHONE (OUTPATIENT)
Dept: ONCOLOGY | Age: 45
End: 2025-01-17

## 2025-01-21 ENCOUNTER — INITIAL CONSULT (OUTPATIENT)
Dept: ONCOLOGY | Age: 45
End: 2025-01-21
Payer: MEDICAID

## 2025-01-21 ENCOUNTER — TELEPHONE (OUTPATIENT)
Dept: ONCOLOGY | Age: 45
End: 2025-01-21

## 2025-01-21 ENCOUNTER — HOSPITAL ENCOUNTER (OUTPATIENT)
Age: 45
Discharge: HOME | End: 2025-01-21
Payer: MEDICAID

## 2025-01-21 VITALS
TEMPERATURE: 97.6 F | DIASTOLIC BLOOD PRESSURE: 84 MMHG | SYSTOLIC BLOOD PRESSURE: 115 MMHG | BODY MASS INDEX: 49.5 KG/M2 | HEIGHT: 62 IN | HEART RATE: 84 BPM | WEIGHT: 269 LBS

## 2025-01-21 DIAGNOSIS — I82.5Y9 CHRONIC DEEP VEIN THROMBOSIS (DVT) OF PROXIMAL VEIN OF LOWER EXTREMITY, UNSPECIFIED LATERALITY (HCC): Primary | ICD-10-CM

## 2025-01-21 DIAGNOSIS — D68.59 THROMBOPHILIA (HCC): ICD-10-CM

## 2025-01-21 DIAGNOSIS — N92.0 MENORRHAGIA WITH REGULAR CYCLE: ICD-10-CM

## 2025-01-21 PROBLEM — I82.509 CHRONIC DEEP VEIN THROMBOSIS (DVT) (HCC): Status: ACTIVE | Noted: 2025-01-21

## 2025-01-21 LAB
FIBRINOGEN PPP-MCNC: 448 MG/DL (ref 210–530)
HCYS SERPL-SCNC: 6.1 UMOL/L (ref 0–15)

## 2025-01-21 PROCEDURE — 86147 CARDIOLIPIN ANTIBODY EA IG: CPT

## 2025-01-21 PROCEDURE — 85613 RUSSELL VIPER VENOM DILUTED: CPT

## 2025-01-21 PROCEDURE — 81241 F5 GENE: CPT

## 2025-01-21 PROCEDURE — 83090 ASSAY OF HOMOCYSTEINE: CPT

## 2025-01-21 PROCEDURE — 81240 F2 GENE: CPT

## 2025-01-21 PROCEDURE — 36415 COLL VENOUS BLD VENIPUNCTURE: CPT

## 2025-01-21 PROCEDURE — 85610 PROTHROMBIN TIME: CPT

## 2025-01-21 PROCEDURE — 85302 CLOT INHIBIT PROT C ANTIGEN: CPT

## 2025-01-21 PROCEDURE — 85384 FIBRINOGEN ACTIVITY: CPT

## 2025-01-21 PROCEDURE — 85305 CLOT INHIBIT PROT S TOTAL: CPT

## 2025-01-21 PROCEDURE — 86146 BETA-2 GLYCOPROTEIN ANTIBODY: CPT

## 2025-01-21 PROCEDURE — 85240 CLOT FACTOR VIII AHG 1 STAGE: CPT

## 2025-01-21 PROCEDURE — 85730 THROMBOPLASTIN TIME PARTIAL: CPT

## 2025-01-21 PROCEDURE — 85300 ANTITHROMBIN III ACTIVITY: CPT

## 2025-01-21 PROCEDURE — 99245 OFF/OP CONSLTJ NEW/EST HI 55: CPT | Performed by: INTERNAL MEDICINE

## 2025-01-21 NOTE — TELEPHONE ENCOUNTER
AVS 01/21/25    Labs today  Rtc in 4-6 weeks    Labs ordered today  Antithrombin III  Complete this on or around 1/21/2025.  Beta-2 GlycoProtein 1 Ab,IGG & IGM  Complete this on or around 1/21/2025.  Cardiolipin Ab IgG, IgM, IgA  Complete this on or around 1/21/2025.  Factor 5 Leiden  Complete this on or around 1/21/2025.  Factor VIII Activity  Complete this on or around 1/21/2025.  Fibrinogen  Complete this on or around 1/21/2025.  Homocysteine  Complete this on or around 1/21/2025.  Lupus Anticoagulant Reflex Panel  Complete this on or around 1/21/2025.  Protein C Activity  Complete this on or around 1/21/2025.  Protein S Antigen, Total  Complete this on or around 1/21/2025.  Protein S activity  Complete this on or around 1/21/2025.  Prothrombin Gene Mutation  Complete this on or around 1/21/2025.    RV 03/04/25    Labs to be done today    Office printed MD office note for pt caregiver, per pt caregiver    PT was given AVS and appt schedule    Electronically signed by La Middleton on 1/21/2025 at 3:27 PM

## 2025-01-21 NOTE — PROGRESS NOTES
to generate a document that actually reflects the content of the visit, the document can still have some errors including those of syntax and sound a like substitutions which may escape proof reading.  It such instances, actual meaning can be extrapolated by contextual diversion.

## 2025-01-22 LAB
B2 GLYCOPROT1 IGG SERPL IA-ACNC: <0.6 ELISA U/ML (ref 0–7)
B2 GLYCOPROT1 IGM SERPL IA-ACNC: 2.6 ELISA U/ML (ref 0–7)
CARDIOLIPIN IGA SER IA-ACNC: 1.4 APL (ref 0–14)
CARDIOLIPIN IGG SER IA-ACNC: <0.5 GPL (ref 0–10)
CARDIOLIPIN IGM SER IA-ACNC: 12 MPL (ref 0–10)
DILUTE RUSSELL VIPER VENOM TIME: ABNORMAL
INR PPP: 1
LUPUS ANTICOAG: ABNORMAL
PARTIAL THROMBOPLASTIN TIME: 28.6 SEC (ref 24–36)
PROTHROMBIN TIME: 13.7 SEC (ref 11.8–14.6)

## 2025-01-23 LAB
AT III ACT/NOR PPP CHRO: 118 % (ref 76–128)
PROT C AG ACT/NOR PPP IA: 83 % (ref 63–153)
PROT S AG ACT/NOR PPP IA: 120 % (ref 63–126)

## 2025-01-27 LAB
CARDIOLIPIN IGA SER IA-ACNC: 1.4 APL (ref 0–14)
CARDIOLIPIN IGG SER IA-ACNC: <0.5 GPL (ref 0–10)
CARDIOLIPIN IGM SER IA-ACNC: 12 MPL (ref 0–10)
DILUTE RUSSELL VIPER VENOM TIME: ABNORMAL
F5 P.R506Q BLD/T QL: NEGATIVE
FACTOR VIII ACTIVITY: 105 % (ref 50–150)
INR PPP: 1
PARTIAL THROMBOPLASTIN TIME: 28.6 SEC (ref 24–36)
PROTHROMBIN TIME: 13.7 SEC (ref 11.8–14.6)
SPECIMEN SOURCE: NORMAL

## 2025-01-28 LAB
F2 C.20210G>A GENO BLD/T: NORMAL
SPECIMEN SOURCE: NORMAL

## 2025-02-27 ENCOUNTER — TELEPHONE (OUTPATIENT)
Dept: ONCOLOGY | Age: 45
End: 2025-02-27

## 2025-03-04 ENCOUNTER — TELEPHONE (OUTPATIENT)
Dept: ONCOLOGY | Age: 45
End: 2025-03-04

## 2025-03-04 ENCOUNTER — OFFICE VISIT (OUTPATIENT)
Dept: ONCOLOGY | Age: 45
End: 2025-03-04
Payer: MEDICAID

## 2025-03-04 VITALS
TEMPERATURE: 99.4 F | OXYGEN SATURATION: 96 % | DIASTOLIC BLOOD PRESSURE: 71 MMHG | SYSTOLIC BLOOD PRESSURE: 108 MMHG | RESPIRATION RATE: 18 BRPM | WEIGHT: 271.6 LBS | BODY MASS INDEX: 49.68 KG/M2 | HEART RATE: 85 BPM

## 2025-03-04 DIAGNOSIS — N92.0 MENORRHAGIA WITH REGULAR CYCLE: ICD-10-CM

## 2025-03-04 DIAGNOSIS — R76.0 ANTI-CARDIOLIPIN ANTIBODY POSITIVE: ICD-10-CM

## 2025-03-04 DIAGNOSIS — I82.5Y9 CHRONIC DEEP VEIN THROMBOSIS (DVT) OF PROXIMAL VEIN OF LOWER EXTREMITY, UNSPECIFIED LATERALITY (HCC): Primary | ICD-10-CM

## 2025-03-04 DIAGNOSIS — F31.9 BIPOLAR AFFECTIVE DISORDER, REMISSION STATUS UNSPECIFIED (HCC): ICD-10-CM

## 2025-03-04 PROCEDURE — 99211 OFF/OP EST MAY X REQ PHY/QHP: CPT | Performed by: INTERNAL MEDICINE

## 2025-03-04 RX ORDER — CYCLOBENZAPRINE HCL 5 MG
5 TABLET ORAL 3 TIMES DAILY PRN
COMMUNITY

## 2025-03-04 NOTE — TELEPHONE ENCOUNTER
AVS 03/04/25      Rtc in 3 months with labs    Labs ordered today  Cardiolipin Ab IgG, IgM, IgA  Complete this on or around 3/4/2025.    RV 06/03/25    Labs to be done week prior    PT was given AVS and appt schedule    Electronically signed by La Middleton on 3/4/2025 at 3:39 PM

## 2025-03-19 ENCOUNTER — APPOINTMENT (OUTPATIENT)
Dept: GENERAL RADIOLOGY | Age: 45
End: 2025-03-19
Payer: MEDICAID

## 2025-03-19 ENCOUNTER — HOSPITAL ENCOUNTER (EMERGENCY)
Age: 45
Discharge: HOME | End: 2025-03-19
Attending: EMERGENCY MEDICINE
Payer: MEDICAID

## 2025-03-19 VITALS
SYSTOLIC BLOOD PRESSURE: 127 MMHG | DIASTOLIC BLOOD PRESSURE: 86 MMHG | HEIGHT: 62 IN | HEART RATE: 94 BPM | WEIGHT: 236 LBS | BODY MASS INDEX: 43.43 KG/M2 | TEMPERATURE: 97.7 F | RESPIRATION RATE: 18 BRPM | OXYGEN SATURATION: 97 %

## 2025-03-19 DIAGNOSIS — M79.601 RIGHT ARM PAIN: Primary | ICD-10-CM

## 2025-03-19 PROCEDURE — 73030 X-RAY EXAM OF SHOULDER: CPT

## 2025-03-19 PROCEDURE — 6370000000 HC RX 637 (ALT 250 FOR IP)

## 2025-03-19 PROCEDURE — 99283 EMERGENCY DEPT VISIT LOW MDM: CPT

## 2025-03-19 RX ORDER — ACETAMINOPHEN 500 MG
1000 TABLET ORAL ONCE
Status: COMPLETED | OUTPATIENT
Start: 2025-03-19 | End: 2025-03-19

## 2025-03-19 RX ADMIN — ACETAMINOPHEN 1000 MG: 500 TABLET ORAL at 16:08

## 2025-03-19 ASSESSMENT — ENCOUNTER SYMPTOMS
NAUSEA: 0
SHORTNESS OF BREATH: 0
DIARRHEA: 0
COUGH: 0
VOMITING: 0
ABDOMINAL PAIN: 0

## 2025-03-19 ASSESSMENT — PAIN SCALES - GENERAL: PAINLEVEL_OUTOF10: 9

## 2025-03-19 ASSESSMENT — PAIN DESCRIPTION - ORIENTATION: ORIENTATION: RIGHT

## 2025-03-19 ASSESSMENT — PAIN DESCRIPTION - LOCATION: LOCATION: ARM

## 2025-03-19 NOTE — ED PROVIDER NOTES
[facility] EMERGENCY DEPARTMENT  eMERGENCY dEPARTMENT eNCOUnter   Attending Attestation     Pt Name: Brenda Mireles  MRN: [id number]  Birthdate 1980  Date of evaluation: 3/19/25       Brenda Mireles is a 44 y.o. female who presents with Arm Pain (Right x3 weeks)      5:39 PM EDT      History: Patient presents with right arm pain.  Patient says it hurts when she moves and lifts things.  Patient has no other complaints.    Exam: Patient has normal range of motion, she does have pain elicited when the arm is moved.  There are arm pain seems to be in the right shoulder.    No chest pain, no shortness of breath, no abdominal pain, no other complaints.  Concern that the patient has injury, will get x-ray, plan for discharge to follow-up with PCP.    I performed a history and physical examination of the patient and discussed management with the resident. I reviewed the resident’s note and agree with the documented findings and plan of care. Any areas of disagreement are noted on the chart. I was personally present for the key portions of any procedures. I have documented in the chart those procedures where I was not present during the key portions. I have personally reviewed all images and agree with the resident's interpretation. I have reviewed the emergency nurses triage note. I agree with the chief complaint, past medical history, past surgical history, allergies, medications, social and family history as documented unless otherwise noted below. Documentation of the HPI, Physical Exam and Medical Decision Making performed by medical students or scribes is based on my personal performance of the HPI, PE and MDM. For Phys Assistant/ Nurse Practitioner cases/documentation I have had a face to face evaluation of this patient and have completed at least one if not all key elements of the E/M (history, physical exam, and MDM). Additional findings are as noted.    For APC cases I have personally evaluated and  examined the patient in conjunction with the APC and agree with the treatment plan and disposition of the patient as recorded by the APC.    Jose Lopez MD  Attending Emergency  Physician       Jose Lopez MD  03/19/25 [id number]

## 2025-03-19 NOTE — DISCHARGE INSTRUCTIONS
You have been evaluated in the Emergency Department at [facility] 3/19/2025.    Instructions & Next Steps:  -Findings: No fracture or dislocation  -Medications: Take tylenol as needed for pain - Take as directed. Continue to take your other home meds/previously prescribed medications unless directed otherwise.  -Follow-up:  Schedule an appointment with your primary care provider (PCP) or establish care with a provider if you do not have one.  -Additional Recommendations: Ice your shoulder, continue stretches    When to Seek Immediate Medical Attention:  Return to the Emergency Department if you experience:    -Worsening or changing symptoms  -Inability to follow up with your PCP  -Any other urgent health concerns  -When in Doubt: If you feel worse or have new concerning symptoms, return to the ER immediately.    For questions about your care or further treatment, call the [facility] Emergency Department at [phone].

## 2025-03-19 NOTE — ED PROVIDER NOTES
[facility] EMERGENCY DEPARTMENT  Emergency Department Encounter  Emergency Medicine Resident     Pt Name:Brenda Mireles  MRN: [id number]  Birthdate 1980  Date of evaluation: 3/19/25  PCP:  Leticia Saenz MD  Note Started: 3:48 PM EDT      CHIEF COMPLAINT       Chief Complaint   Patient presents with    Arm Pain     Right x3 weeks       HISTORY OF PRESENT ILLNESS  (Location/Symptom, Timing/Onset, Context/Setting, Quality, Duration, Modifying Factors, Severity.)      Brenda Mireles is a 44 y.o. female who presents with right arm pain x 2 days.  Patient states she was cleaning tables at work 2 days ago when she developed pain in her right shoulder.  States the pain has been constant 10 out of 10 in severity worse with cleaning tables at work as well as pushing off of the bed.  She describes it as feeling \"locked up\".  Patient has been trying ice with minimal relief.  Patient denies any other injuries.  No rashes.  No numbness tingling or weakness.  Patient's review of system otherwise unremarkable.    PAST MEDICAL / SURGICAL / SOCIAL / FAMILY HISTORY      has a past medical history of Asthma, Bipolar affect, depressed ([facility]), Environmental allergies, GERD (gastroesophageal reflux disease), Lumbar pain, and PVD (peripheral vascular disease).       has a past surgical history that includes Dilation and curettage of uterus (2010); Sophia tooth extraction; and Dental surgery (07/17/2013).      Social History     Socioeconomic History    Marital status: Single     Spouse name: Not on file    Number of children: Not on file    Years of education: Not on file    Highest education level: Not on file   Occupational History    Not on file   Tobacco Use    Smoking status: Never    Smokeless tobacco: Never   Vaping Use    Vaping status: Never Used   Substance and Sexual Activity    Alcohol use: No    Drug use: No    Sexual activity: Yes     Partners: Male   Other Topics Concern    Not on file   Social History

## 2025-05-05 ENCOUNTER — APPOINTMENT (OUTPATIENT)
Dept: GENERAL RADIOLOGY | Age: 45
End: 2025-05-05
Payer: MEDICAID

## 2025-05-05 ENCOUNTER — HOSPITAL ENCOUNTER (EMERGENCY)
Age: 45
Discharge: HOME | End: 2025-05-05
Attending: EMERGENCY MEDICINE
Payer: MEDICAID

## 2025-05-05 VITALS
OXYGEN SATURATION: 97 % | WEIGHT: 279.1 LBS | HEART RATE: 88 BPM | SYSTOLIC BLOOD PRESSURE: 148 MMHG | RESPIRATION RATE: 18 BRPM | TEMPERATURE: 97.9 F | DIASTOLIC BLOOD PRESSURE: 94 MMHG | BODY MASS INDEX: 51.05 KG/M2

## 2025-05-05 DIAGNOSIS — R07.89 CHEST WALL PAIN: Primary | ICD-10-CM

## 2025-05-05 PROCEDURE — 96372 THER/PROPH/DIAG INJ SC/IM: CPT

## 2025-05-05 PROCEDURE — 93005 ELECTROCARDIOGRAM TRACING: CPT | Performed by: STUDENT IN AN ORGANIZED HEALTH CARE EDUCATION/TRAINING PROGRAM

## 2025-05-05 PROCEDURE — 71046 X-RAY EXAM CHEST 2 VIEWS: CPT

## 2025-05-05 PROCEDURE — 6360000002 HC RX W HCPCS: Performed by: STUDENT IN AN ORGANIZED HEALTH CARE EDUCATION/TRAINING PROGRAM

## 2025-05-05 PROCEDURE — 99284 EMERGENCY DEPT VISIT MOD MDM: CPT

## 2025-05-05 RX ORDER — CYCLOBENZAPRINE HCL 5 MG
5 TABLET ORAL 2 TIMES DAILY PRN
Qty: 10 TABLET | Refills: 0 | Status: SHIPPED | OUTPATIENT
Start: 2025-05-05 | End: 2025-05-15

## 2025-05-05 RX ORDER — KETOROLAC TROMETHAMINE 30 MG/ML
30 INJECTION, SOLUTION INTRAMUSCULAR; INTRAVENOUS ONCE
Status: COMPLETED | OUTPATIENT
Start: 2025-05-05 | End: 2025-05-05

## 2025-05-05 RX ADMIN — KETOROLAC TROMETHAMINE 30 MG: 30 INJECTION, SOLUTION INTRAMUSCULAR at 07:33

## 2025-05-05 ASSESSMENT — ENCOUNTER SYMPTOMS
BACK PAIN: 0
ABDOMINAL PAIN: 0
WHEEZING: 0
SINUS PRESSURE: 0
NAUSEA: 0
SORE THROAT: 0
CONSTIPATION: 0
SHORTNESS OF BREATH: 0
DIARRHEA: 0
VOMITING: 0

## 2025-05-05 ASSESSMENT — PAIN SCALES - GENERAL: PAINLEVEL_OUTOF10: 10

## 2025-05-05 ASSESSMENT — PAIN DESCRIPTION - ORIENTATION: ORIENTATION: LEFT

## 2025-05-05 ASSESSMENT — PAIN DESCRIPTION - LOCATION: LOCATION: RIB CAGE

## 2025-05-05 NOTE — ED NOTES
Pt to ED via triage with c/o left sided rib pain for the past couple days. Denies injury but then states she fell a couple days ago. States she cannot sleep at night. Pain radiates to back. When asked if pt has shortness of breath, pt responds \"always do.\" Pt is a/ox4, ambulatory, RR even and non labored on room air, call light in reach.

## 2025-05-05 NOTE — DISCHARGE INSTRUCTIONS
You are seen in Emergency Department for left-sided rib pain.  EKG and x-rays are unremarkable.  Likely muscular strain versus rib dysfunction due to coughing.  You are given a prescription for muscle relaxer.  This will make you sleepy recommend taking it at night.  Do not take before driving or working.  He would also use Tylenol Motrin at home for pain.  Additionally can use ice or heat whichever is more comfortable.  Please follow-up with your primary care provider as necessary.  Please return the Emergency Department for any new or worsening symptoms.

## 2025-05-05 NOTE — ED PROVIDER NOTES
[facility] EMERGENCY DEPARTMENT     Emergency Department     Faculty Attestation    I performed a history and physical examination of the patient and discussed management with the resident. I reviewed the resident’s note and agree with the documented findings and plan of care. Any areas of disagreement are noted on the chart. I was personally present for the key portions of any procedures. I have documented in the chart those procedures where I was not present during the key portions. I have reviewed the emergency nurses triage note. I agree with the chief complaint, past medical history, past surgical history, allergies, medications, social and family history as documented unless otherwise noted below. For Physician Assistant/ Nurse Practitioner cases/documentation I have personally evaluated this patient and have completed at least one if not all key elements of the E/M (history, physical exam, and MDM). Additional findings are as noted.    7:38 AM EDT    Patient presents with pain to her left lateral chest wall.  She says she has had it for the last 2 days.  She says that she had a cough and felt a pop when the pain started.  She says she has had a cough and congestion for the last several days.  She denies any fevers.  She denies any abdominal pain, nausea or vomiting.  On exam, patient is resting comfortably in the bed.  She is not in respiratory distress.  Lungs clear to auscultation bilaterally.  There is tenderness to palpation of the left lateral chest wall.  Abdomen is soft and nontender.  The bilateral calves are nontender nonswollen.  Will get an EKG and chest x-ray and treat patient's pain and reassess.    EKG Interpretation    Interpreted by me    Rhythm: normal sinus   Rate: normal  Axis: normal  Ectopy: none  Conduction: normal  ST Segments: no acute change  T Waves: no acute change  Q Waves: none    Clinical Impression: no acute changes and normal EKG      Carmen Whitfield MD  Attending 
tenderness.   Musculoskeletal:         General: Normal range of motion.      Cervical back: Normal range of motion and neck supple.      Right lower leg: No edema.      Left lower leg: No edema.   Skin:     General: Skin is warm and dry.   Neurological:      General: No focal deficit present.      Mental Status: She is alert and oriented to person, place, and time.   Psychiatric:         Mood and Affect: Mood normal.           DDX/DIAGNOSTIC RESULTS / EMERGENCY DEPARTMENT COURSE / [facility]     Medical Decision Making  Patient 44-year-old female with above-stated medical history presented emerged part with left-sided rib pain.  Started after a bout of coughing.  History of asthma.  Chronically short of breath.  Does have some tenderness of the left lateral ribs.  Will obtain x-ray to rule out obvious rib pathology or underlying pneumonia.  Given complaint of anterior chest pain earlier in the week we will obtain EKG.  Attempted treat with antiinflammatory medications.  Anticipate muscle strain versus rib dysfunction.  Likely discharge.    Amount and/or Complexity of Data Reviewed  Radiology: ordered.  ECG/medicine tests: ordered.    Risk  Prescription drug management.        EKG  EKG completed 0733 shows sinus rhythm with a rate of 85 bpm.  Normal axis.  Normal intervals  Nonspecific ST-T.  No acute ST elevations or depressions.    All EKG's are interpreted by the Emergency Department Physician who either signs or Co-signs this chart in the absence of a cardiologist.    EMERGENCY DEPARTMENT COURSE:      ED Course as of 05/05/25 1651   Mon May 05, 2025   0835 Patient reevaluated.  Still having minimal pain.  EKG is nonacute.  X-ray of the chest shows no acute findings.  Likely muscular strain versus intercostal strain versus rib dysfunction.  Will discharge home with limited prescription for a muscle relaxant. [JL]      ED Course User Index  [JL] Arnaud Delgado,        PROCEDURES:  None    CONSULTS:  None    CRITICAL

## 2025-05-06 LAB
EKG ATRIAL RATE: 85 BPM
EKG P AXIS: 57 DEGREES
EKG P-R INTERVAL: 172 MS
EKG Q-T INTERVAL: 396 MS
EKG QRS DURATION: 90 MS
EKG QTC CALCULATION (BAZETT): 471 MS
EKG R AXIS: 26 DEGREES
EKG T AXIS: 43 DEGREES
EKG VENTRICULAR RATE: 85 BPM

## 2025-05-06 PROCEDURE — 93010 ELECTROCARDIOGRAM REPORT: CPT | Performed by: INTERNAL MEDICINE

## 2025-05-27 ENCOUNTER — TELEPHONE (OUTPATIENT)
Age: 45
End: 2025-05-27

## 2025-05-30 ENCOUNTER — TELEPHONE (OUTPATIENT)
Age: 45
End: 2025-05-30

## 2025-05-30 NOTE — TELEPHONE ENCOUNTER
**lvm to reschedule appt, provider out of office in pm**     Electronically signed by La Middleton on 5/30/2025 at 9:20 AM

## 2025-06-02 ENCOUNTER — TELEPHONE (OUTPATIENT)
Age: 45
End: 2025-06-02

## 2025-06-02 NOTE — TELEPHONE ENCOUNTER
Pt calling office back to reschedule appt. Pt would like to look at what other appts she has before rescheduling.    Electronically signed by La Middleton on 6/2/2025 at 9:49 AM

## 2025-06-17 ENCOUNTER — HOSPITAL ENCOUNTER (OUTPATIENT)
Age: 45
Discharge: HOME | End: 2025-06-17
Payer: MEDICAID

## 2025-06-17 DIAGNOSIS — R76.0 ANTI-CARDIOLIPIN ANTIBODY POSITIVE: ICD-10-CM

## 2025-06-17 PROCEDURE — 85306 CLOT INHIBIT PROT S FREE: CPT

## 2025-06-17 PROCEDURE — 85290 CLOT FACTOR XIII FIBRIN STAB: CPT

## 2025-06-17 PROCEDURE — 36415 COLL VENOUS BLD VENIPUNCTURE: CPT

## 2025-06-17 PROCEDURE — 85303 CLOT INHIBIT PROT C ACTIVITY: CPT

## 2025-06-17 PROCEDURE — 86147 CARDIOLIPIN ANTIBODY EA IG: CPT

## 2025-06-18 LAB
CARDIOLIPIN IGA SER IA-ACNC: 2.6 APL (ref 0–14)
CARDIOLIPIN IGG SER IA-ACNC: 0.8 GPL (ref 0–10)
CARDIOLIPIN IGM SER IA-ACNC: 8.2 MPL (ref 0–10)

## 2025-06-19 LAB — FACTOR XIII ACTIVITY: 114 % (ref 69–143)

## 2025-06-23 LAB
PROTEIN C ACTIVITY: 105 %
PROTEIN S ACTIVITY: 114 % (ref 59–130)

## 2025-06-25 ENCOUNTER — TELEPHONE (OUTPATIENT)
Age: 45
End: 2025-06-25

## 2025-07-08 ENCOUNTER — OFFICE VISIT (OUTPATIENT)
Age: 45
End: 2025-07-08
Payer: MEDICAID

## 2025-07-08 ENCOUNTER — TELEPHONE (OUTPATIENT)
Age: 45
End: 2025-07-08

## 2025-07-08 VITALS
DIASTOLIC BLOOD PRESSURE: 81 MMHG | TEMPERATURE: 98 F | OXYGEN SATURATION: 95 % | HEART RATE: 85 BPM | BODY MASS INDEX: 50.99 KG/M2 | SYSTOLIC BLOOD PRESSURE: 115 MMHG | WEIGHT: 278.8 LBS

## 2025-07-08 DIAGNOSIS — N92.0 MENORRHAGIA WITH REGULAR CYCLE: ICD-10-CM

## 2025-07-08 DIAGNOSIS — R76.0 ANTI-CARDIOLIPIN ANTIBODY POSITIVE: ICD-10-CM

## 2025-07-08 DIAGNOSIS — D75.839 THROMBOCYTOSIS: ICD-10-CM

## 2025-07-08 DIAGNOSIS — I82.5Y9 CHRONIC DEEP VEIN THROMBOSIS (DVT) OF PROXIMAL VEIN OF LOWER EXTREMITY, UNSPECIFIED LATERALITY (HCC): Primary | ICD-10-CM

## 2025-07-08 PROCEDURE — 99214 OFFICE O/P EST MOD 30 MIN: CPT | Performed by: INTERNAL MEDICINE

## 2025-07-08 PROCEDURE — 99213 OFFICE O/P EST LOW 20 MIN: CPT | Performed by: INTERNAL MEDICINE

## 2025-07-08 PROCEDURE — 99211 OFF/OP EST MAY X REQ PHY/QHP: CPT | Performed by: INTERNAL MEDICINE

## 2025-07-08 NOTE — PROGRESS NOTES
swelling   Extremities - peripheral pulses normal, no pedal edema, no clubbing or cyanosis   Skin - normal coloration and turgor, no rashes, no suspicious skin lesions noted ,      LABORATORY DATA:     Lab Results   Component Value Date    WBC 11.4 (H) 09/23/2023    HGB 14.3 09/23/2023    HCT 45.2 09/23/2023    MCV 89.7 09/23/2023     (H) 09/23/2023    LYMPHOPCT 25 09/23/2023    RBC 5.04 09/23/2023    MCH 28.4 09/23/2023    MCHC 31.6 09/23/2023    RDW 13.0 09/23/2023    NEUTOPHILPCT 64 09/23/2023    MONOPCT 9 09/23/2023    EOSPCT 1 09/23/2023    BASOPCT 1 09/23/2023    NEUTROABS 7.36 09/23/2023    LYMPHSABS 2.85 09/23/2023    MONOSABS 1.02 09/23/2023    EOSABS 0.08 09/23/2023    BASOSABS 0.08 09/23/2023         Chemistry        Component Value Date/Time     09/23/2023 1400    K 3.9 09/23/2023 1400     09/23/2023 1400    CO2 24 09/23/2023 1400    BUN 11 09/23/2023 1400    CREATININE 0.7 09/23/2023 1400        Component Value Date/Time    CALCIUM 9.0 09/23/2023 1400    ALKPHOS 65 03/27/2023 1644    AST 47 (H) 03/27/2023 1644    ALT 76 (H) 03/27/2023 1644    BILITOT 0.4 03/27/2023 1644            PATHOLOGY DATA:         IMAGING DATA:      XR CHEST (2 VW)  Narrative: EXAM:  2 VIEW(S) XRAY OF THE CHEST  05/05/2025 07:41:00 AM    COMPARISON:  08/22/2024    CLINICAL HISTORY:  Left rib pain, cough.    FINDINGS:    LUNGS AND PLEURA:  No consolidation. No pulmonary edema. No pleural effusion. No pneumothorax.     HEART AND MEDIASTINUM:  No acute abnormality of the cardiac and mediastinal silhouettes.    BONES AND SOFT TISSUES:  No acute osseous abnormality.  Impression: 1. No acute process.       ASSESSMENT:       Diagnosis Orders   1. Chronic deep vein thrombosis (DVT) of proximal vein of lower extremity, unspecified laterality (HCC) [I82.5Y9]        2. Thrombocytosis  CBC with Auto Differential      3. Menorrhagia with regular cycle  Iron and TIBC    Ferritin      4. Anti-cardiolipin antibody positive

## 2025-07-08 NOTE — TELEPHONE ENCOUNTER
AVS 07/08/25    Instructions   from Dr. Ezio De Santiago MD    Rtc in 3 months with labs  Attach my note to her paper work    Labs ordered today  Ferritin  Complete this on or around 7/8/2025.  Iron and TIBC  Complete this on or around 7/8/2025.  CBC with Auto Differential  Complete this on or around 7/15/2025.    RV 10/21/25    Labs to be done week prior    Office printed off office visit notes per pt  request and attached to paper from facility    PT was given AVS and appt schedule    Electronically signed by La Middleton on 7/8/2025 at 10:56 AM

## 2025-08-21 ENCOUNTER — OFFICE VISIT (OUTPATIENT)
Age: 45
End: 2025-08-21

## 2025-08-21 VITALS
DIASTOLIC BLOOD PRESSURE: 72 MMHG | HEIGHT: 62 IN | OXYGEN SATURATION: 98 % | HEART RATE: 80 BPM | TEMPERATURE: 98.1 F | BODY MASS INDEX: 51.16 KG/M2 | SYSTOLIC BLOOD PRESSURE: 112 MMHG | RESPIRATION RATE: 18 BRPM | WEIGHT: 278 LBS

## 2025-08-21 DIAGNOSIS — R19.7 DIARRHEA, UNSPECIFIED TYPE: Primary | ICD-10-CM

## 2025-08-21 RX ORDER — CITALOPRAM HYDROBROMIDE 20 MG/1
20 TABLET ORAL DAILY
COMMUNITY
Start: 2025-07-31

## 2025-08-21 RX ORDER — CETIRIZINE HYDROCHLORIDE 10 MG/1
10 TABLET ORAL DAILY
COMMUNITY
Start: 2025-08-07

## 2025-08-21 RX ORDER — LOPERAMIDE HYDROCHLORIDE 2 MG/1
2 CAPSULE ORAL 4 TIMES DAILY PRN
Qty: 40 CAPSULE | Refills: 0 | Status: SHIPPED | OUTPATIENT
Start: 2025-08-21 | End: 2025-08-31

## 2025-08-21 RX ORDER — BENZONATATE 100 MG/1
100 CAPSULE ORAL 3 TIMES DAILY PRN
COMMUNITY
Start: 2025-05-06

## 2025-08-21 RX ORDER — MELOXICAM 7.5 MG/1
7.5 TABLET ORAL DAILY PRN
COMMUNITY
Start: 2025-03-10

## 2025-08-21 RX ORDER — MONTELUKAST SODIUM 10 MG/1
10 TABLET ORAL NIGHTLY
COMMUNITY
Start: 2025-08-04

## 2025-08-21 RX ORDER — HYDROCORTISONE 25 MG/G
1 CREAM TOPICAL 2 TIMES DAILY
COMMUNITY
Start: 2025-08-11

## 2025-08-21 ASSESSMENT — ENCOUNTER SYMPTOMS
ALLERGIC/IMMUNOLOGIC NEGATIVE: 1
RESPIRATORY NEGATIVE: 1
DIARRHEA: 1
EYES NEGATIVE: 1